# Patient Record
Sex: FEMALE | Race: WHITE | NOT HISPANIC OR LATINO | Employment: OTHER | URBAN - METROPOLITAN AREA
[De-identification: names, ages, dates, MRNs, and addresses within clinical notes are randomized per-mention and may not be internally consistent; named-entity substitution may affect disease eponyms.]

---

## 2017-02-03 ENCOUNTER — OFFICE VISIT (OUTPATIENT)
Dept: ENDOCRINOLOGY | Facility: CLINIC | Age: 72
End: 2017-02-03
Payer: COMMERCIAL

## 2017-02-03 VITALS
HEIGHT: 63 IN | SYSTOLIC BLOOD PRESSURE: 142 MMHG | HEART RATE: 78 BPM | DIASTOLIC BLOOD PRESSURE: 80 MMHG | WEIGHT: 158.5 LBS | BODY MASS INDEX: 28.08 KG/M2

## 2017-02-03 DIAGNOSIS — E04.1 SOLITARY THYROID NODULE: ICD-10-CM

## 2017-02-03 DIAGNOSIS — Z13.29 SCREENING FOR THYROID DISORDER: ICD-10-CM

## 2017-02-03 DIAGNOSIS — E04.1 THYROID NODULE: Primary | ICD-10-CM

## 2017-02-03 LAB
T4 FREE SERPL-MCNC: 1.06 NG/DL (ref 0.76–1.46)
TSH SERPL DL<=0.05 MIU/L-ACNC: 1.84 MU/L (ref 0.4–4)

## 2017-02-03 PROCEDURE — 84443 ASSAY THYROID STIM HORMONE: CPT | Performed by: INTERNAL MEDICINE

## 2017-02-03 PROCEDURE — 84439 ASSAY OF FREE THYROXINE: CPT | Performed by: INTERNAL MEDICINE

## 2017-02-03 PROCEDURE — 99204 OFFICE O/P NEW MOD 45 MIN: CPT | Performed by: INTERNAL MEDICINE

## 2017-02-03 PROCEDURE — 86376 MICROSOMAL ANTIBODY EACH: CPT | Performed by: INTERNAL MEDICINE

## 2017-02-03 PROCEDURE — 36415 COLL VENOUS BLD VENIPUNCTURE: CPT | Performed by: INTERNAL MEDICINE
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
## 2017-02-03 NOTE — MR AVS SNAPSHOT
After Visit Summary   2/3/2017    Nicole Slaughter    MRN: [id number]           Patient Information     Date Of Birth          1945        Visit Information        Provider Department      2/3/2017 11:00 AM Elizabeth Dey MD [facility]        Today's Diagnoses     Thyroid nodule    -  1     Screening for thyroid disorder           Care Instructions        Getting Ready for Ultrasound-guided Biopsy (FNA)  You will have a biopsy of your:  Thyroid   What is this test?  This test uses a very thin needle to remove tissue or other cells from your body. We then send the cells to a lab for testing.   Pictures from an ultrasound will help guide the needle to the right place. (Ultrasound uses sound waves to create pictures of the body on a video screen. You will not feel the sound waves.)  How do I get ready?  Tell us in advance if there s any chance you may be pregnant.  Bring a list of your medicines to the exam. Include vitamins, minerals and over-the-counter drugs.   ? If you take blood thinners, such as Aspirin or Ibuprofen, you will need to stop taking them a few days before treatment. Talk to your doctor before stopping these medicines.    Talk to your doctor before stopping these medicines:  ? Stop taking Coumadin (warfarin) 3 days before treatment. Restart the day after treatment.   ? Stop taking Coumadin (warfarin) 5 days before treatment. Restart the day after treatment.  ? If you take Plavix, Ticlid, Pletal or Persantine, please ask your doctor if you should stop these medicines. You may need extra tests on the morning of your scan.    What will happen during my test?    Please arrive 15 minutes early. (If you will receive sedation, arrive 60 minutes early.) Wear comfortable clothes and leave your valuables at home.   When it s time for your biopsy:    You will lie on your back or side.    We will do an ultrasound to find the area to be sampled.   - We rub warm gel on your  skin. The gel helps us get good pictures during the exam.  - We gently move a wand, or transducer, over your skin. (For prostate exams, we place the wand into the rectum. The wand is about 1 inch wide. It should not hurt, but it may cause discomfort.)  - The wand sends sound waves through your body. The sound waves make pictures on a video screen. You will not feel or hear the sound waves.    Next, we will ask you to stay very still. We will clean the skin and numb it with a shot of medicine. You will feel a slight burning as the skin gets numb.     The doctor will insert the needle and take one or more samples of cells or tissue. This takes only a few seconds. You will feel pressure or slight pain during this time. We may need to move the needle in and out to get enough cells.  You will need to lie quietly for a while after the test. We use an ice pack to reduce swelling or bleeding. You will also have a Band-Aid over the needle site.   When you get home, take it easy for the rest of the day. You may have slight bruising and mild pain in the area. If so, you may take acetaminophen (Tylenol) or ibuprofen (Advil, Motrin) after you get home.  Is it safe?  Your doctor will talk to you about any risks in advance.   When will I know the results?  Your Endocrinologist will give you the results when they are available.  Who should I call with questions?  Please call the Diagnostic Imaging Department with any questions. [phone].    Thank you for choosing the [facility], Department of Endocrinology. It has been a pleasure servicing you today. Please contact us at [phone] with any questions. If you need to speak to an Endocrinologist and it is after 5:00 pm or on a weekend, please call the On-Call Endocrinologist at [phone].          Follow-ups after your visit        Your next 10 appointments already scheduled     Apr 07, 2017 11:00 AM   US BIOPSY THYROID FINE NEEDLE ASPIRATION with MGUS1,  MG US TECH, MG IMAGING NURSE, MG NEURO RAD   [facility] ([facility])    [address] [place] [id number] [phone]           Tell us in advance if there s any chance you may be pregnant.  Bring a list of your medicines to the exam. Include vitamins, minerals and over-the-counter drugs.  If you take blood thinners, you may need to stop taking them a few days before treatment. Talk to your doctor before stopping these medicines. You will need a blood test the morning of your exam.   Stop taking Coumadin (warfarin) 3 days before your exam. Restart the day after your exam.   If you take aspirin, you may need to stop taking it 3 days before your scan.   If you take Plavix, Ticlid, Pletal or Persantine, you may need to stop taking them 5 days before your scan. Please talk to your doctor before stopping these medicines.  If you will receive sedation for this test (medicine to help you relax):   See your family doctor for an exam within 30 days of treatment.   Plan for an adult to drive you home and stay with you for at least 6 hours.   Follow the eating and drinking guidelines checked below:   No eating or drinking for 4 hours before your test. You may take medicine with small sips of water.   If you have diabetes:If you take insulin, call your diabetes care team. Do not take diabetes pills on the morning of your test. If you take metformin (Avandamet, Glucophage, Glucovance, Metaglip) and received contrast, wait 48 hours before re-starting this medicine.  Please call the Imaging Department at your exam site with any questions.            Apr 21, 2017 11:30 AM   Return Visit with Elizabeth Dey MD   [facility] ([facility])    [address] [address] [place] [id number] [phone]              Future tests that were ordered for you today     Open Future Orders        Priority Expected Expires Ordered    US guided thyroid FNA  "Routine  2/3/2018 2/3/2017            Who to contact     If you have questions or need follow up information about today's clinic visit or your schedule please contact [facility] directly at [phone].  Normal or non-critical lab and imaging results will be communicated to you by Cactushart, letter or phone within 4 business days after the clinic has received the results. If you do not hear from us within 7 days, please contact the clinic through Cactushart or phone. If you have a critical or abnormal lab result, we will notify you by phone as soon as possible.  Submit refill requests through Ohana or call your pharmacy and they will forward the refill request to us. Please allow 3 business days for your refill to be completed.          Additional Information About Your Visit        Ohana Information     Ohana gives you secure access to your electronic health record. If you see a primary care provider, you can also send messages to your care team and make appointments. If you have questions, please call your primary care clinic.  If you do not have a primary care provider, please call [phone] and they will assist you.      Ohana is an electronic gateway that provides easy, online access to your medical records. With Ohana, you can request a clinic appointment, read your test results, renew a prescription or communicate with your care team.     To access your existing account, please contact your [facility] or call [phone] for assistance.        Care EveryWhere ID     This is your Care EveryWhere ID. This could be used by other organizations to access your [facility] medical records  [id number]        Your Vitals Were     Pulse Height BMI (Body Mass Index)             78 1.6 m (5' 3\") 28.08 kg/m2          Blood Pressure from Last 3 Encounters:   02/03/17 142/80   11/22/16 147/93   11/15/16 138/74    Weight from Last 3 Encounters:   02/03/17 71.895 kg " (158 lb 8 oz)   11/15/16 69.4 kg (153 lb)   09/07/16 70.489 kg (155 lb 6.4 oz)              We Performed the Following     T4, free     Thyroid peroxidase antibody     TSH          Today's Medication Changes          These changes are accurate as of: 2/3/17 11:32 AM.  If you have any questions, ask your nurse or doctor.               Stop taking these medicines if you haven't already. Please contact your care team if you have questions.     fluticasone 50 MCG/ACT spray   Commonly known as:  FLONASE   Stopped by:  Elizabeth Dey MD                    Primary Care Provider Office Phone # Fax #    Lizett ECKERT MD [facility] [phone]       [facility] [id number] [place] [address]        Thank you!     Thank you for choosing [facility]  for your care. Our goal is always to provide you with excellent care. Hearing back from our patients is one way we can continue to improve our services. Please take a few minutes to complete the written survey that you may receive in the mail after your visit with us. Thank you!             Your Updated Medication List - Protect others around you: Learn how to safely use, store and throw away your medicines at www.disposemymeds.org.          This list is accurate as of: 2/3/17 11:32 AM.  Always use your most recent med list.                   Brand Name Dispense Instructions for use    acyclovir 400 MG tablet    ZOVIRAX    60 tablet    TAKE 1 TABLET (400 MG) BY ORAL ROUTE 2 TIMES PER DAY       atorvastatin 40 MG tablet    LIPITOR    10 tablet    TAKE 1 TABLET (40 MG) BY MOUTH TWICE A WEEK       CALCIUM-VITAMIN D PO      Take  by mouth daily.       FLUoxetine 10 MG capsule    PROzac    60 capsule    TAKE 2 CAPSULES (20 MG) BY MOUTH DAILY       gabapentin 100 MG capsule    NEURONTIN    270 capsule    Take 1 capsule (100 mg) by mouth 3 times daily       hydrochlorothiazide 25 MG tablet    HYDRODIURIL    90 tablet    TAKE 1 TABLET (25 MG) BY ORAL  ROUTE ONCE DAILY       OMEGA 3 PO      Take  by mouth. 1 DAILY       omeprazole 20 MG CR capsule    priLOSEC    30 capsule    TAKE 1 CAPSULE BY MOUTH DAILY       SUMAtriptan 50 MG tablet    IMITREX    18 tablet    TAKE 1 TABLET AT ONSET OF HEADACHE.  MAY REPEAT IN 2 HOURS AS NEEDED . MAX. 2 TABLETS DAILY. AVERAGE 12 HEADACHES MONTHLY       triamcinolone 0.1 % cream    KENALOG    15 g    Apply topically 2 times daily as needed for irritation Apply sparingly to affected area three times daily for 14 days.

## 2017-02-03 NOTE — NURSING NOTE
"Nicole Slaughter's goals for this visit include:   Chief Complaint   Patient presents with     Consult       She requests these members of her care team be copied on today's visit information: Yes    PCP: Lizett Parikh    Referring Provider:  Lizett Parikh MD  [facility]  [id number] [place] [id number]    Chief Complaint   Patient presents with     Consult       Initial Ht 1.6 m (5' 3\")  Wt 71.895 kg (158 lb 8 oz)  BMI 28.08 kg/m2 Estimated body mass index is 28.08 kg/(m^2) as calculated from the following:    Height as of this encounter: 1.6 m (5' 3\").    Weight as of this encounter: 71.895 kg (158 lb 8 oz).  BP completed using cuff size: regular    "

## 2017-02-03 NOTE — PATIENT INSTRUCTIONS
Getting Ready for Ultrasound-guided Biopsy (FNA)  You will have a biopsy of your:  Thyroid   What is this test?  This test uses a very thin needle to remove tissue or other cells from your body. We then send the cells to a lab for testing.   Pictures from an ultrasound will help guide the needle to the right place. (Ultrasound uses sound waves to create pictures of the body on a video screen. You will not feel the sound waves.)  How do I get ready?  Tell us in advance if there s any chance you may be pregnant.  Bring a list of your medicines to the exam. Include vitamins, minerals and over-the-counter drugs.   ? If you take blood thinners, such as Aspirin or Ibuprofen, you will need to stop taking them a few days before treatment. Talk to your doctor before stopping these medicines.    Talk to your doctor before stopping these medicines:  ? Stop taking Coumadin (warfarin) 3 days before treatment. Restart the day after treatment.   ? Stop taking Coumadin (warfarin) 5 days before treatment. Restart the day after treatment.  ? If you take Plavix, Ticlid, Pletal or Persantine, please ask your doctor if you should stop these medicines. You may need extra tests on the morning of your scan.    What will happen during my test?    Please arrive 15 minutes early. (If you will receive sedation, arrive 60 minutes early.) Wear comfortable clothes and leave your valuables at home.   When it s time for your biopsy:    You will lie on your back or side.    We will do an ultrasound to find the area to be sampled.   - We rub warm gel on your skin. The gel helps us get good pictures during the exam.  - We gently move a wand, or transducer, over your skin. (For prostate exams, we place the wand into the rectum. The wand is about 1 inch wide. It should not hurt, but it may cause discomfort.)  - The wand sends sound waves through your body. The sound waves make pictures on a video screen. You will not feel or hear the sound waves.     Next, we will ask you to stay very still. We will clean the skin and numb it with a shot of medicine. You will feel a slight burning as the skin gets numb.     The doctor will insert the needle and take one or more samples of cells or tissue. This takes only a few seconds. You will feel pressure or slight pain during this time. We may need to move the needle in and out to get enough cells.  You will need to lie quietly for a while after the test. We use an ice pack to reduce swelling or bleeding. You will also have a Band-Aid over the needle site.   When you get home, take it easy for the rest of the day. You may have slight bruising and mild pain in the area. If so, you may take acetaminophen (Tylenol) or ibuprofen (Advil, Motrin) after you get home.  Is it safe?  Your doctor will talk to you about any risks in advance.   When will I know the results?  Your Endocrinologist will give you the results when they are available.  Who should I call with questions?  Please call the Diagnostic Imaging Department with any questions. [phone].    Thank you for choosing the [facility], Department of Endocrinology. It has been a pleasure servicing you today. Please contact us at [phone] with any questions. If you need to speak to an Endocrinologist and it is after 5:00 pm or on a weekend, please call the On-Call Endocrinologist at [phone].

## 2017-02-06 LAB — THYROPEROXIDASE AB SERPL-ACNC: 60 IU/ML

## 2017-03-01 DIAGNOSIS — I10 ESSENTIAL HYPERTENSION WITH GOAL BLOOD PRESSURE LESS THAN 140/90: ICD-10-CM

## 2017-03-02 RX ORDER — HYDROCHLOROTHIAZIDE 25 MG/1
TABLET ORAL
Qty: 90 TABLET | Refills: 0 | Status: SHIPPED | OUTPATIENT
Start: 2017-03-02 | End: 2017-06-30

## 2017-03-02 NOTE — TELEPHONE ENCOUNTER
Hydrochlorothiazide 25 mg      Last Written Prescription Date: 09/29/2016  Last Fill Quantity: 90, # refills: 1  Last Office Visit with [facility], [facility] or [facility] prescribing provider: 11/15/2016  Next 5 appointments (look out 90 days)     Apr 21, 2017 11:30 AM CDT   Return Visit with Elizabeth Dey MD   [facility] ([facility])    [address] [place] [id number] [phone]                   Potassium   Date Value Ref Range Status   04/29/2016 3.6 3.4 - 5.3 mmol/L Final     Creatinine   Date Value Ref Range Status   04/29/2016 0.67 0.52 - 1.04 mg/dL Final     BP Readings from Last 3 Encounters:   02/03/17 142/80   11/22/16 (!) 147/93   11/15/16 138/74

## 2017-03-02 NOTE — TELEPHONE ENCOUNTER
Prescription approved per [facility] Refill Protocol.  Due for labs in April  Tatiana Lizama RN

## 2017-04-07 ENCOUNTER — RADIANT APPOINTMENT (OUTPATIENT)
Dept: ULTRASOUND IMAGING | Facility: CLINIC | Age: 72
End: 2017-04-07
Attending: INTERNAL MEDICINE
Payer: COMMERCIAL

## 2017-04-07 DIAGNOSIS — E04.1 THYROID NODULE: ICD-10-CM

## 2017-04-07 PROCEDURE — 88173 CYTOPATH EVAL FNA REPORT: CPT | Performed by: FAMILY MEDICINE

## 2017-04-07 PROCEDURE — 76942 ECHO GUIDE FOR BIOPSY: CPT | Performed by: RADIOLOGY

## 2017-04-07 PROCEDURE — 00000102 ZZHCL STATISTIC CYTO WRIGHT STAIN TC: Performed by: FAMILY MEDICINE

## 2017-04-07 PROCEDURE — 10022 US BIOPSY THYROID FINE NEEDLE ASPIRATION: CPT | Performed by: RADIOLOGY

## 2017-04-12 LAB — COPATH REPORT: NORMAL

## 2017-04-21 ENCOUNTER — OFFICE VISIT (OUTPATIENT)
Dept: ENDOCRINOLOGY | Facility: CLINIC | Age: 72
End: 2017-04-21
Payer: COMMERCIAL

## 2017-04-21 VITALS
SYSTOLIC BLOOD PRESSURE: 122 MMHG | HEIGHT: 63 IN | HEART RATE: 76 BPM | BODY MASS INDEX: 28 KG/M2 | WEIGHT: 158 LBS | DIASTOLIC BLOOD PRESSURE: 70 MMHG

## 2017-04-21 DIAGNOSIS — E28.319 EARLY MENOPAUSE OCCURRING IN PATIENT AGE YOUNGER THAN 45 YEARS: Primary | ICD-10-CM

## 2017-04-21 DIAGNOSIS — E04.1 THYROID NODULE: ICD-10-CM

## 2017-04-21 PROCEDURE — 99214 OFFICE O/P EST MOD 30 MIN: CPT | Performed by: INTERNAL MEDICINE

## 2017-04-21 NOTE — MR AVS SNAPSHOT
After Visit Summary   4/21/2017    Nicole Slaughter    MRN: [id number]           Patient Information     Date Of Birth          1945        Visit Information        Provider Department      4/21/2017 11:30 AM Elizabeth Dey MD [facility]        Today's Diagnoses     Early menopause occurring in patient age younger than 45 years    -  1    Thyroid nodule          Care Instructions    Thank you for choosing the [facility] Department of Endocrinology. It has been a pleasure servicing you today. Please contact us at [phone] with any questions. If you need to speak to an Endocrinologist and it is after 5:00 pm or on a weekend, please call the On-Call Endocrinologist at [phone].    4/27/18 11:30 follow up with Dr Dey    Please arrive 15 minutes early for DEXA and Ultra Sound. For Dexa please wear no snaps, zippers or buttons.    If you are taking Calcium (including Tums), Multivitamin, or Vitamin D; Please discontinue 2 days prior and day of DEXA Scan. It is ok to resume these medications after the scan is complete unless indicated by your doctor. Please check in at the West Entrance. Desk #2.             Follow-ups after your visit        Your next 10 appointments already scheduled     May 01, 2017 11:30 AM CDT   DX HIP/PELVIS/SPINE with MGDX1   [facility])    [address] [place] [id number] [phone]           Please do not take any of the following 48 hours prior to your exam: vitamins, calcium tablets, antacids.            Apr 16, 2018 11:30 AM CDT   US THYROID with MGUS1, MG US TECH   [facility])    [address] [place] [address] [phone]           Please bring a list of your medicines (including vitamins, minerals and over-the-counter drugs). Also, tell your doctor about any allergies you may have. Wear  comfortable clothes and leave your valuables at home.  You do not need to do anything special to prepare for your exam.  Please call the Imaging Department at your exam site with any questions.              Future tests that were ordered for you today     Open Future Orders        Priority Expected Expires Ordered    US Thyroid Routine 4/21/2018 6/21/2018 4/21/2017    DX Hip/Pelvis/Spine Routine  4/21/2018 4/21/2017            Who to contact     If you have questions or need follow up information about today's clinic visit or your schedule please contact [facility] directly at [phone].  Normal or non-critical lab and imaging results will be communicated to you by Litographshart, letter or phone within 4 business days after the clinic has received the results. If you do not hear from us within 7 days, please contact the clinic through Litographshart or phone. If you have a critical or abnormal lab result, we will notify you by phone as soon as possible.  Submit refill requests through Wayna or call your pharmacy and they will forward the refill request to us. Please allow 3 business days for your refill to be completed.          Additional Information About Your Visit        MyChart Information     Wayna gives you secure access to your electronic health record. If you see a primary care provider, you can also send messages to your care team and make appointments. If you have questions, please call your primary care clinic.  If you do not have a primary care provider, please call [phone] and they will assist you.      Wayna is an electronic gateway that provides easy, online access to your medical records. With Wayna, you can request a clinic appointment, read your test results, renew a prescription or communicate with your care team.     To access your existing account, please contact your [facility] or call [phone] for assistance.        Care EveryWhere ID   "   This is your Care EveryWhere ID. This could be used by other organizations to access your [place] medical records  [id number]        Your Vitals Were     Pulse Height BMI (Body Mass Index)             76 1.6 m (5' 3\") 27.99 kg/m2          Blood Pressure from Last 3 Encounters:   04/21/17 122/70   02/03/17 142/80   11/22/16 (!) 147/93    Weight from Last 3 Encounters:   04/21/17 71.7 kg (158 lb)   02/03/17 71.9 kg (158 lb 8 oz)   11/15/16 69.4 kg (153 lb)               Primary Care Provider Office Phone # Fax #    Lizett Parikh [phone]       [facility] [address] [facility] [address]        Thank you!     Thank you for choosing [facility]  for your care. Our goal is always to provide you with excellent care. Hearing back from our patients is one way we can continue to improve our services. Please take a few minutes to complete the written survey that you may receive in the mail after your visit with us. Thank you!             Your Updated Medication List - Protect others around you: Learn how to safely use, store and throw away your medicines at www.disposemymeds.org.          This list is accurate as of: 4/21/17 12:25 PM.  Always use your most recent med list.                   Brand Name Dispense Instructions for use    acyclovir 400 MG tablet    ZOVIRAX    60 tablet    TAKE 1 TABLET (400 MG) BY ORAL ROUTE 2 TIMES PER DAY       atorvastatin 40 MG tablet    LIPITOR    10 tablet    TAKE 1 TABLET (40 MG) BY MOUTH TWICE A WEEK       CALCIUM-VITAMIN D PO      Take  by mouth daily.       FLUoxetine 10 MG capsule    PROzac    60 capsule    TAKE 2 CAPSULES (20 MG) BY MOUTH DAILY       gabapentin 100 MG capsule    NEURONTIN    270 capsule    Take 1 capsule (100 mg) by mouth 3 times daily       hydrochlorothiazide 25 MG tablet    HYDRODIURIL    90 tablet    TAKE ONE TABLET BY MOUTH ONE TIME DAILY       OMEGA 3 PO      Reported on 4/21/2017       omeprazole 20 MG CR " capsule    priLOSEC    30 capsule    TAKE 1 CAPSULE BY MOUTH DAILY       SUMAtriptan 50 MG tablet    IMITREX    18 tablet    TAKE 1 TABLET AT ONSET OF HEADACHE.  MAY REPEAT IN 2 HOURS AS NEEDED . MAX. 2 TABLETS DAILY. AVERAGE 12 HEADACHES MONTHLY       triamcinolone 0.1 % cream    KENALOG    15 g    Apply topically 2 times daily as needed for irritation Apply sparingly to affected area three times daily for 14 days.

## 2017-04-21 NOTE — PROGRESS NOTES
- Endocrinology Follow up-    Reason for visit/consult: follow up thyroid nodule    Primary care provider: Lizett Parikh    HPI: 71 yo male with follow up right thyroid nodule 2 cm solid,  First FNA was AUS, then came here and we repeated on 4/7/2017.   Results were benign.     Still osing eye brow over 1 year, lost eye lush last year.    Today she mentioned her nails also became fragile.     Menopause: age 39 endometriosis  7 year ago bone density was normal.     Prior fragility fracture:no  Parental history of hip fracture:  Rheumatoid arthritis:no (osteroarthritis)  Secondary cause of osteoporosis:  Body habitus (BMI less than or equal to 20):   family histoyr of osteoporosis: no  Sedentary lifestyle:no  Current tabacco smoking:no  History of thyroid disorder:nodule  Calcuim and Vitmin D supplements:some calcium tab, greek yogurt  Other supplement:    Past Medical/Surgical History:  Past Medical History:   Diagnosis Date     Cervicalgia      Depressive disorder      Generalized osteoarthrosis, unspecified site      Migraine, unspecified, without mention of intractable migraine without mention of status migrainosus      Thyroid nodule 12/1/2016     Unspecified essential hypertension      Past Surgical History:   Procedure Laterality Date     HYSTERECTOMY, ATNG       ORTHOPEDIC SURGERY       SOFT TISSUE SURGERY         Allergies:  Allergies   Allergen Reactions     Tylenol W/Codeine [Acetaminophen-Codeine] Itching     codeine     Percocet [Oxycodone-Acetaminophen] Rash     Sulfa Drugs Rash       Current Medications   Current Outpatient Prescriptions   Medication     hydrochlorothiazide (HYDRODIURIL) 25 MG tablet     acyclovir (ZOVIRAX) 400 MG tablet     FLUoxetine (PROZAC) 10 MG capsule     gabapentin (NEURONTIN) 100 MG capsule     atorvastatin (LIPITOR) 40 MG tablet     CALCIUM-VITAMIN D PO     SUMAtriptan (IMITREX) 50 MG tablet     triamcinolone (KENALOG) 0.1 % cream      "omeprazole (PRILOSEC) 20 MG capsule     Omega-3 Fatty Acids (OMEGA 3 PO)     No current facility-administered medications for this visit.        Family History:  Family History   Problem Relation Age of Onset     Arthritis Mother      Lipids Mother      Hypertension Mother      Breast Cancer Mother      Anesthesia Reaction Mother      HEART DISEASE Father      CEREBROVASCULAR DISEASE Father      Arthritis Maternal Grandmother      Breast Cancer Maternal Aunt      Breast Cancer Maternal Aunt      DIABETES No family hx of      Coronary Artery Disease No family hx of      Hyperlipidemia No family hx of      Prostate Cancer No family hx of      Other Cancer No family hx of      Anxiety Disorder No family hx of      MENTAL ILLNESS No family hx of      Substance Abuse No family hx of        Social History:  Social History   Substance Use Topics     Smoking status: Never Smoker     Smokeless tobacco: Never Used     Alcohol use Yes      Comment: wine       ROS:  Full review of systems taken with the help of the intake sheet. Otherwise a complete 14 point review of systems was taken and is negative unless stated in the history above.    Physical Exam:   Blood pressure 122/70, pulse 76, height 1.6 m (5' 3\"), weight 71.7 kg (158 lb), not currently breastfeeding.  General: well appearing, no acute distress, pleasant and conversant,   Mental Status/neuro: alert and oriented  Face: symmetrical, normal facial color  Eyes: anicteric, PERRL,   Neck: suppler, no lymphadenopahty  Heart: regular rhythm, S1S2, no murmur appreciated  Lung: clear to auscultation bilaterally  Abdomen: soft, NT/ND, no hepatomegaly  Legs: no swelling or edema        Labs (General):   Lab Results   Component Value Date     04/29/2016      Lab Results   Component Value Date    POTASSIUM 3.6 04/29/2016     Lab Results   Component Value Date    CHLORIDE 103 04/29/2016     Lab Results   Component Value Date    MELLISA 8.7 04/29/2016     Lab Results   Component " Value Date    CO2 27 04/29/2016     Lab Results   Component Value Date    BUN 14 04/29/2016     Lab Results   Component Value Date    CR 0.67 04/29/2016     Lab Results   Component Value Date    GLC 89 04/29/2016     Lab Results   Component Value Date    TSH 1.84 02/03/2017     Lab Results   Component Value Date    T4 1.06 02/03/2017       Repeated FNA right nodule 4/7/2017    CYTOLOGIC INTERPRETATION:      Thyroid, right nodule, ultrasound-guided fine needle aspiration:   Benign.   Consistent with a benign nodule (includes adenomatoid nodule, colloid   nodule, etc.)   Specimen Adequacy: Satisfactory for evaluation.     The [place] Implied Risk of Malignancy and Recommended Clinical   Management:   Benign has a 0-3% risk of malignancy, recommended management is clinical   follow-up.     COMMENT:    This case was also reviewed by  who concurred with the above   diagnosis.     I have personally reviewed all specimens and/or slides, including the   listed special stains, and used them with my medical judgment to   determine the final diagnosis.     Electronically signed out by:   Wade Contreras M.D., Physicians     Processed and screened at [facility]     CLINICAL HISTORY:   The patient is a 72-year-old woman with a 1.9 cm right thyroid lobe   nodule with minima vascularity.  Previous fine needle aspiration was   consistent with atypical of undetermined significance.  Procedure:   Ultrasound-guided fine needle aspiration.       Assessment and Plan  72 year old female with thyroid nodule follow up  # nodule was benign, explained patient and gave a copy of the results  - Thyroid sonogram annual follow up next year 4/2018.     # bone health- risk factor early menopause age 39  - Bone density  -calicum citrate plus D 1260 mg ca, 1000 IU VitD  -based on the results, we will consider further bone treatment.    - RTC with me in 1 year      I spent 30  minutes with this patient face to face and explained the conditions and plans (more than 50% of time was counseling/coordination of care, follow up plan of thryoid, bone health) . The patient understood and is satisfied with today's visit. Return to clinic with me in 1 year.         Elizabeth Dye MD  Staff Physician  Endocrinology and Metabolism  License: [id number]

## 2017-04-21 NOTE — NURSING NOTE
"Nicole Slaughter's goals for this visit include:   Chief Complaint   Patient presents with     Thyroid Problem       She requests these members of her care team be copied on today's visit information: Lizett Parikh      PCP: Lizett Parikh    Referring Provider:  No referring provider defined for this encounter.    Chief Complaint   Patient presents with     Thyroid Problem       Initial /70  Pulse 76  Ht 1.6 m (5' 3\")  Wt 71.7 kg (158 lb)  BMI 27.99 kg/m2 Estimated body mass index is 27.99 kg/(m^2) as calculated from the following:    Height as of this encounter: 1.6 m (5' 3\").    Weight as of this encounter: 71.7 kg (158 lb).  Medication Reconciliation: complete    Do you need any medication refills at today's visit? No    Ora Dubose LPN    "

## 2017-04-21 NOTE — LETTER
4/21/2017       RE: Nicole Slaughter  [address] [place] [address]     Dear Colleague,    Thank you for referring your patient, Nicole Slaughter, to the [facility]. Please see a copy of my visit note below.                                      - Endocrinology Follow up-    Reason for visit/consult: follow up thyroid nodule    Primary care provider: iLzett Parikh    HPI: 73 yo male with follow up right thyroid nodule 2 cm solid,  First FNA was AUS, then came here and we repeated on 4/7/2017.   Results were benign.     Still osing eye brow over 1 year, lost eye lush last year.    Today she mentioned her nails also became fragile.     Menopause: age 39 endometriosis  7 year ago bone density was normal.     Prior fragility fracture:no  Parental history of hip fracture:  Rheumatoid arthritis:no (osteroarthritis)  Secondary cause of osteoporosis:  Body habitus (BMI less than or equal to 20):   family histoyr of osteoporosis: no  Sedentary lifestyle:no  Current tabacco smoking:no  History of thyroid disorder:nodule  Calcuim and Vitmin D supplements:some calcium tab, greek yogurt  Other supplement:    Past Medical/Surgical History:  Past Medical History:   Diagnosis Date     Cervicalgia      Depressive disorder      Generalized osteoarthrosis, unspecified site      Migraine, unspecified, without mention of intractable migraine without mention of status migrainosus      Thyroid nodule 12/1/2016     Unspecified essential hypertension      Past Surgical History:   Procedure Laterality Date     HYSTERECTOMY, TANG       ORTHOPEDIC SURGERY       SOFT TISSUE SURGERY         Allergies:  Allergies   Allergen Reactions     Tylenol W/Codeine [Acetaminophen-Codeine] Itching     codeine     Percocet [Oxycodone-Acetaminophen] Rash     Sulfa Drugs Rash       Current Medications   Current Outpatient Prescriptions   Medication     hydrochlorothiazide  "(HYDRODIURIL) 25 MG tablet     acyclovir (ZOVIRAX) 400 MG tablet     FLUoxetine (PROZAC) 10 MG capsule     gabapentin (NEURONTIN) 100 MG capsule     atorvastatin (LIPITOR) 40 MG tablet     CALCIUM-VITAMIN D PO     SUMAtriptan (IMITREX) 50 MG tablet     triamcinolone (KENALOG) 0.1 % cream     omeprazole (PRILOSEC) 20 MG capsule     Omega-3 Fatty Acids (OMEGA 3 PO)     No current facility-administered medications for this visit.        Family History:  Family History   Problem Relation Age of Onset     Arthritis Mother      Lipids Mother      Hypertension Mother      Breast Cancer Mother      Anesthesia Reaction Mother      HEART DISEASE Father      CEREBROVASCULAR DISEASE Father      Arthritis Maternal Grandmother      Breast Cancer Maternal Aunt      Breast Cancer Maternal Aunt      DIABETES No family hx of      Coronary Artery Disease No family hx of      Hyperlipidemia No family hx of      Prostate Cancer No family hx of      Other Cancer No family hx of      Anxiety Disorder No family hx of      MENTAL ILLNESS No family hx of      Substance Abuse No family hx of        Social History:  Social History   Substance Use Topics     Smoking status: Never Smoker     Smokeless tobacco: Never Used     Alcohol use Yes      Comment: wine       ROS:  Full review of systems taken with the help of the intake sheet. Otherwise a complete 14 point review of systems was taken and is negative unless stated in the history above.    Physical Exam:   Blood pressure 122/70, pulse 76, height 1.6 m (5' 3\"), weight 71.7 kg (158 lb), not currently breastfeeding.  General: well appearing, no acute distress, pleasant and conversant,   Mental Status/neuro: alert and oriented  Face: symmetrical, normal facial color  Eyes: anicteric, PERRL,   Neck: suppler, no lymphadenopahty  Heart: regular rhythm, S1S2, no murmur appreciated  Lung: clear to auscultation bilaterally  Abdomen: soft, NT/ND, no hepatomegaly  Legs: no swelling or edema        Labs " (General):   Lab Results   Component Value Date     04/29/2016      Lab Results   Component Value Date    POTASSIUM 3.6 04/29/2016     Lab Results   Component Value Date    CHLORIDE 103 04/29/2016     Lab Results   Component Value Date    MELLISA 8.7 04/29/2016     Lab Results   Component Value Date    CO2 27 04/29/2016     Lab Results   Component Value Date    BUN 14 04/29/2016     Lab Results   Component Value Date    CR 0.67 04/29/2016     Lab Results   Component Value Date    GLC 89 04/29/2016     Lab Results   Component Value Date    TSH 1.84 02/03/2017     Lab Results   Component Value Date    T4 1.06 02/03/2017       Repeated FNA right nodule 4/7/2017    CYTOLOGIC INTERPRETATION:      Thyroid, right nodule, ultrasound-guided fine needle aspiration:   Benign.   Consistent with a benign nodule (includes adenomatoid nodule, colloid   nodule, etc.)   Specimen Adequacy: Satisfactory for evaluation.     The [place] Implied Risk of Malignancy and Recommended Clinical   Management:   Benign has a 0-3% risk of malignancy, recommended management is clinical   follow-up.     COMMENT:    This case was also reviewed by  who concurred with the above   diagnosis.     I have personally reviewed all specimens and/or slides, including the   listed special stains, and used them with my medical judgment to   determine the final diagnosis.     Electronically signed out by:   Wade Contreras M.D., [facility]     Processed and screened at [facility]     CLINICAL HISTORY:   The patient is a 72-year-old woman with a 1.9 cm right thyroid lobe   nodule with minima vascularity.  Previous fine needle aspiration was   consistent with atypical of undetermined significance.  Procedure:   Ultrasound-guided fine needle aspiration.       Assessment and Plan  72 year old female with thyroid nodule follow up  # nodule was benign, explained patient and gave a copy of the  results  - Thyroid sonogram annual follow up next year 4/2018.     # bone health- risk factor early menopause age 39  - Bone density  -calicum citrate plus D 1260 mg ca, 1000 IU VitD  -based on the results, we will consider further bone treatment.    - RTC with me in 1 year      I spent 30 minutes with this patient face to face and explained the conditions and plans (more than 50% of time was counseling/coordination of care, follow up plan of thryoid, bone health) . The patient understood and is satisfied with today's visit. Return to clinic with me in 1 year.         Elizabeth Dey MD  Staff Physician  Endocrinology and Metabolism  License: [id number]    Again, thank you for allowing me to participate in the care of your patient.      Sincerely,    Elizabeth Dey MD

## 2017-04-21 NOTE — PATIENT INSTRUCTIONS
Thank you for choosing the [facility], Department of Endocrinology. It has been a pleasure servicing you today. Please contact us at [phone] with any questions. If you need to speak to an Endocrinologist and it is after 5:00 pm or on a weekend, please call the On-Call Endocrinologist at [phone].    4/27/18 11:30 follow up with Dr Dey    Please arrive 15 minutes early for DEXA and Ultra Sound. For Dexa please wear no snaps, zippers or buttons.    If you are taking Calcium (including Tums), Multivitamin, or Vitamin D; Please discontinue 2 days prior and day of DEXA Scan. It is ok to resume these medications after the scan is complete unless indicated by your doctor. Please check in at the West Entrance. Desk #2.

## 2017-05-01 ENCOUNTER — RADIANT APPOINTMENT (OUTPATIENT)
Dept: BONE DENSITY | Facility: CLINIC | Age: 72
End: 2017-05-01
Attending: INTERNAL MEDICINE
Payer: COMMERCIAL

## 2017-05-01 DIAGNOSIS — E28.319 EARLY MENOPAUSE OCCURRING IN PATIENT AGE YOUNGER THAN 45 YEARS: ICD-10-CM

## 2017-05-01 PROCEDURE — 77080 DXA BONE DENSITY AXIAL: CPT | Performed by: RADIOLOGY

## 2017-05-03 DIAGNOSIS — K21.9 GERD (GASTROESOPHAGEAL REFLUX DISEASE): ICD-10-CM

## 2017-05-03 NOTE — TELEPHONE ENCOUNTER
prilosec      Last Written Prescription Date: 04/01/16  Last Fill Quantity: 30,  # refills: 11   Last Office Visit with [facility], [facility] or [facility] prescribing provider: 11/15/16

## 2017-05-04 ENCOUNTER — TELEPHONE (OUTPATIENT)
Dept: ENDOCRINOLOGY | Facility: CLINIC | Age: 72
End: 2017-05-04

## 2017-05-04 NOTE — TELEPHONE ENCOUNTER
[facility] Call Center    Phone Message    Name of Caller: tamia    Phone Number: Home number on file [phone] (home)    Best time to return call: any    May a detailed message be left on voicemail: yes    Relation to patient: Self    Reason for Call: Other: Patient states Dr Dey wants to see her in June.  No appointments available.  Was an appointment in Mid May.  She did not want that one due to new medication?      Action Taken: Message routed to:  Adult Clinics: Endocrinology p [phone]

## 2017-05-04 NOTE — TELEPHONE ENCOUNTER
Received staff message from Dr. Dey as follows:    Hi     We found she has osteopenia, which is new, (originally came for thyroid), I told her to come back brief visti next month and will discuss bone treatment.   Could you assist?     Elizabeth       Patient advised. Patient verbalizes understanding and agrees to plan. Appointment scheduled.       Swathi Santana RN  Endocrine Care Coordinator  [facility]

## 2017-05-05 NOTE — TELEPHONE ENCOUNTER
Prescription approved per [facility] Refill Protocol.  Rosa Mera, RN, BSN

## 2017-05-06 DIAGNOSIS — E78.5 HYPERLIPIDEMIA LDL GOAL <130: Primary | ICD-10-CM

## 2017-05-08 RX ORDER — ATORVASTATIN CALCIUM 40 MG/1
TABLET, FILM COATED ORAL
Qty: 10 TABLET | Refills: 0 | Status: SHIPPED | OUTPATIENT
Start: 2017-05-08 | End: 2017-06-21

## 2017-05-08 NOTE — TELEPHONE ENCOUNTER
Medication is being filled for 1 time refill only due to:  Patient needs labs fasting.     Gay Weir, RN, BSN

## 2017-05-08 NOTE — TELEPHONE ENCOUNTER
atorvastatin (LIPITOR) 40 MG tablet     Last Written Prescription Date: 7/29/2016  Last Fill Quantity: 10, # refills: 6  Last Office Visit with [facility], [facility] or [facility] prescribing provider: 11/15/2016  Next 5 appointments (look out 90 days)     May 19, 2017 10:30 AM CDT   Return Visit with Elizabeth Dey MD   [facility]    [address] [place] [id number]   [phone]                   Lab Results   Component Value Date    CHOL 195 04/29/2016     Lab Results   Component Value Date    HDL 60 04/29/2016     Lab Results   Component Value Date     04/29/2016     Lab Results   Component Value Date    TRIG 112 04/29/2016     Lab Results   Component Value Date    CHOLHDLRATIO 3.0 03/24/2015

## 2017-05-15 DIAGNOSIS — E78.5 HYPERLIPIDEMIA LDL GOAL <130: ICD-10-CM

## 2017-05-15 LAB
ALT SERPL W P-5'-P-CCNC: 30 U/L (ref 0–50)
ANION GAP SERPL CALCULATED.3IONS-SCNC: 9 MMOL/L (ref 3–14)
BUN SERPL-MCNC: 18 MG/DL (ref 7–30)
CALCIUM SERPL-MCNC: 9 MG/DL (ref 8.5–10.1)
CHLORIDE SERPL-SCNC: 102 MMOL/L (ref 94–109)
CHOLEST SERPL-MCNC: 225 MG/DL
CO2 SERPL-SCNC: 30 MMOL/L (ref 20–32)
CREAT SERPL-MCNC: 0.77 MG/DL (ref 0.52–1.04)
GFR SERPL CREATININE-BSD FRML MDRD: 74 ML/MIN/1.7M2
GLUCOSE SERPL-MCNC: 99 MG/DL (ref 70–99)
HDLC SERPL-MCNC: 80 MG/DL
LDLC SERPL CALC-MCNC: 129 MG/DL
NONHDLC SERPL-MCNC: 145 MG/DL
POTASSIUM SERPL-SCNC: 3.9 MMOL/L (ref 3.4–5.3)
SODIUM SERPL-SCNC: 141 MMOL/L (ref 133–144)
TRIGL SERPL-MCNC: 81 MG/DL

## 2017-05-15 PROCEDURE — 84460 ALANINE AMINO (ALT) (SGPT): CPT | Performed by: FAMILY MEDICINE

## 2017-05-15 PROCEDURE — 80061 LIPID PANEL: CPT | Performed by: FAMILY MEDICINE

## 2017-05-15 PROCEDURE — 36415 COLL VENOUS BLD VENIPUNCTURE: CPT | Performed by: FAMILY MEDICINE

## 2017-05-15 PROCEDURE — 80048 BASIC METABOLIC PNL TOTAL CA: CPT | Performed by: FAMILY MEDICINE

## 2017-05-19 ENCOUNTER — OFFICE VISIT (OUTPATIENT)
Dept: ENDOCRINOLOGY | Facility: CLINIC | Age: 72
End: 2017-05-19
Payer: COMMERCIAL

## 2017-05-19 VITALS
HEIGHT: 63 IN | DIASTOLIC BLOOD PRESSURE: 69 MMHG | HEART RATE: 81 BPM | WEIGHT: 159.4 LBS | SYSTOLIC BLOOD PRESSURE: 136 MMHG | BODY MASS INDEX: 28.24 KG/M2

## 2017-05-19 DIAGNOSIS — M85.80 OSTEOPENIA: Primary | ICD-10-CM

## 2017-05-19 DIAGNOSIS — E04.1 THYROID NODULE: ICD-10-CM

## 2017-05-19 PROBLEM — E28.319 EARLY MENOPAUSE: Status: ACTIVE | Noted: 2017-05-19

## 2017-05-19 PROCEDURE — 99214 OFFICE O/P EST MOD 30 MIN: CPT | Performed by: INTERNAL MEDICINE

## 2017-05-19 RX ORDER — ZOLEDRONIC ACID 5 MG/100ML
5 INJECTION, SOLUTION INTRAVENOUS ONCE
Status: CANCELLED
Start: 2017-05-19 | End: 2017-05-19

## 2017-05-19 NOTE — LETTER
5/19/2017       RE: Nicole Slaughter  [address] [place] [address]     Dear Colleague,    Thank you for referring your patient, Nicole Slaughter, to the [facility] at [facility]. Please see a copy of my visit note below.                                                     - Endocrinology Initial Consultation -    Reason for visit/consult: osteopenia, thyroid nodule    Primary care provider: Lizett Parikh    HPI: 73 yo male with follow up right thyroid nodule 2 cm solid, first FNA was AUS, then repeated one was benign on 4/7/2017. She has remote history of endometriosis and had early menopause age 39.  No fractures, active life+.  Sent for bone density test, which came back osteopenia (T=-2.1).         Past Medical/Surgical History:  Past Medical History:   Diagnosis Date     Cervicalgia      Depressive disorder      Generalized osteoarthrosis, unspecified site      Migraine, unspecified, without mention of intractable migraine without mention of status migrainosus      Thyroid nodule 12/1/2016     Unspecified essential hypertension      Past Surgical History:   Procedure Laterality Date     HYSTERECTOMY, TANG       ORTHOPEDIC SURGERY       SOFT TISSUE SURGERY         Allergies:  Allergies   Allergen Reactions     Tylenol W/Codeine [Acetaminophen-Codeine] Itching     codeine     Percocet [Oxycodone-Acetaminophen] Rash     Sulfa Drugs Rash       Current Medications   Current Outpatient Prescriptions   Medication     MAGNESIUM PO     Probiotic Product (PROBIOTIC DAILY PO)     Black Pepper-Turmeric (TURMERIC COMPLEX/BLACK PEPPER PO)     omeprazole (PRILOSEC) 20 MG CR capsule     gabapentin (NEURONTIN) 100 MG capsule     hydrochlorothiazide (HYDRODIURIL) 25 MG tablet     acyclovir (ZOVIRAX) 400 MG tablet     FLUoxetine (PROZAC) 10 MG capsule     atorvastatin (LIPITOR) 40 MG tablet     CALCIUM-VITAMIN D PO     atorvastatin (LIPITOR) 40 MG tablet      "SUMAtriptan (IMITREX) 50 MG tablet     triamcinolone (KENALOG) 0.1 % cream     Omega-3 Fatty Acids (OMEGA 3 PO)     No current facility-administered medications for this visit.        Family History:  Family History   Problem Relation Age of Onset     Arthritis Mother      Lipids Mother      Hypertension Mother      Breast Cancer Mother      Anesthesia Reaction Mother      HEART DISEASE Father      CEREBROVASCULAR DISEASE Father      Arthritis Maternal Grandmother      Breast Cancer Maternal Aunt      Breast Cancer Maternal Aunt      DIABETES No family hx of      Coronary Artery Disease No family hx of      Hyperlipidemia No family hx of      Prostate Cancer No family hx of      Other Cancer No family hx of      Anxiety Disorder No family hx of      MENTAL ILLNESS No family hx of      Substance Abuse No family hx of        Social History:  Social History   Substance Use Topics     Smoking status: Never Smoker     Smokeless tobacco: Never Used     Alcohol use Yes      Comment: wine       ROS:  Full review of systems taken with the help of the intake sheet. Otherwise a complete 14 point review of systems was taken and is negative unless stated in the history above.    Physical Exam:   Blood pressure 136/69, pulse 81, height 1.6 m (5' 3\"), weight 72.3 kg (159 lb 6.4 oz), not currently breastfeeding.  General: well appearing, no acute distress, pleasant and conversant,   Mental Status/neuro: alert and oriented  Face: symmetrical, normal facial color  Eyes: anicteric, PERRL, no proptosis or lid lag  Back: no kyphosis  Neck: suppler, no lymphadenopahty  Thyroid: normal size and texture, no nodule palpable, no bruits  Heart: regular rhythm, S1S2, no murmur appreciated  Lung: clear to auscultation bilaterally  Abdomen: soft, NT/ND, no hepatomegaly  Legs: no swelling or edema        Labs (General):   Lab Results   Component Value Date     05/15/2017      Lab Results   Component Value Date    POTASSIUM 3.9 05/15/2017 "     Lab Results   Component Value Date    CHLORIDE 102 05/15/2017     Lab Results   Component Value Date    MELLISA 9.0 05/15/2017     Lab Results   Component Value Date    CO2 30 05/15/2017     Lab Results   Component Value Date    BUN 18 05/15/2017     Lab Results   Component Value Date    CR 0.77 05/15/2017     Lab Results   Component Value Date    GLC 99 05/15/2017     Lab Results   Component Value Date    TSH 1.84 02/03/2017     Lab Results   Component Value Date    T4 1.06 02/03/2017 5/1/2017 Bone density   Lumbar spine T-score in region of L1-L4, with the exception of L3 =  1.7   HIPS: Mean total hip T-score: -0.1     Left femoral neck T-score = -0.9  Right femoral neck T-score= -0.9      Radius 33% T-score = -2.1      Assessment and Plan  72 year old female with thyroid nodule 2 cm (AUS then benign), and osteopenia,  # lumber, femur seems OK (lumer could be degenerative change), but radius has osteopenia  - Continue current exercise/active life  - Continue calcium citrate plus Vitamin D tab (Ca 1260 mg, Vit D 1000 IU)  - Discussed other treatment options, and explained reported side effects and benefit, we will set up once a year Reclast infusion.   - Next sonogram 4/2018 was discussed previous visit  -RTC with me in 1 year after next bone density test.       I spent 30 minutes with this patient face to face and explained the conditions and plans (more than 50% of time was counseling/coordination of care, bone health, long term management of osteopenia) . The patient understood and is satisfied with today's visit. Return to clinic with me in 1 year.         Elizabeth Dey MD  Staff Physician  Endocrinology and Metabolism  License: [id number]    Again, thank you for allowing me to participate in the care of your patient.      Sincerely,    Elizabeth Dey MD

## 2017-05-19 NOTE — PROGRESS NOTES
- Endocrinology Initial Consultation -    Reason for visit/consult: osteopenia, thyroid nodule    Primary care provider: Lizett Parikh    HPI: 73 yo male with follow up right thyroid nodule 2 cm solid, first FNA was AUS, then repeated one was benign on 4/7/2017. She has remote history of endometriosis and had early menopause age 39.  No fractures, active life+.  Sent for bone density test, which came back osteopenia (T=-2.1).         Past Medical/Surgical History:  Past Medical History:   Diagnosis Date     Cervicalgia      Depressive disorder      Generalized osteoarthrosis, unspecified site      Migraine, unspecified, without mention of intractable migraine without mention of status migrainosus      Thyroid nodule 12/1/2016     Unspecified essential hypertension      Past Surgical History:   Procedure Laterality Date     HYSTERECTOMY, TANG       ORTHOPEDIC SURGERY       SOFT TISSUE SURGERY         Allergies:  Allergies   Allergen Reactions     Tylenol W/Codeine [Acetaminophen-Codeine] Itching     codeine     Percocet [Oxycodone-Acetaminophen] Rash     Sulfa Drugs Rash       Current Medications   Current Outpatient Prescriptions   Medication     MAGNESIUM PO     Probiotic Product (PROBIOTIC DAILY PO)     Black Pepper-Turmeric (TURMERIC COMPLEX/BLACK PEPPER PO)     omeprazole (PRILOSEC) 20 MG CR capsule     gabapentin (NEURONTIN) 100 MG capsule     hydrochlorothiazide (HYDRODIURIL) 25 MG tablet     acyclovir (ZOVIRAX) 400 MG tablet     FLUoxetine (PROZAC) 10 MG capsule     atorvastatin (LIPITOR) 40 MG tablet     CALCIUM-VITAMIN D PO     atorvastatin (LIPITOR) 40 MG tablet     SUMAtriptan (IMITREX) 50 MG tablet     triamcinolone (KENALOG) 0.1 % cream     Omega-3 Fatty Acids (OMEGA 3 PO)     No current facility-administered medications for this visit.        Family History:  Family History   Problem Relation Age of Onset     Arthritis Mother      Lipids Mother       "Hypertension Mother      Breast Cancer Mother      Anesthesia Reaction Mother      HEART DISEASE Father      CEREBROVASCULAR DISEASE Father      Arthritis Maternal Grandmother      Breast Cancer Maternal Aunt      Breast Cancer Maternal Aunt      DIABETES No family hx of      Coronary Artery Disease No family hx of      Hyperlipidemia No family hx of      Prostate Cancer No family hx of      Other Cancer No family hx of      Anxiety Disorder No family hx of      MENTAL ILLNESS No family hx of      Substance Abuse No family hx of        Social History:  Social History   Substance Use Topics     Smoking status: Never Smoker     Smokeless tobacco: Never Used     Alcohol use Yes      Comment: wine       ROS:  Full review of systems taken with the help of the intake sheet. Otherwise a complete 14 point review of systems was taken and is negative unless stated in the history above.    Physical Exam:   Blood pressure 136/69, pulse 81, height 1.6 m (5' 3\"), weight 72.3 kg (159 lb 6.4 oz), not currently breastfeeding.  General: well appearing, no acute distress, pleasant and conversant,   Mental Status/neuro: alert and oriented  Face: symmetrical, normal facial color  Eyes: anicteric, PERRL, no proptosis or lid lag  Back: no kyphosis  Neck: suppler, no lymphadenopahty  Thyroid: normal size and texture, no nodule palpable, no bruits  Heart: regular rhythm, S1S2, no murmur appreciated  Lung: clear to auscultation bilaterally  Abdomen: soft, NT/ND, no hepatomegaly  Legs: no swelling or edema        Labs (General):   Lab Results   Component Value Date     05/15/2017      Lab Results   Component Value Date    POTASSIUM 3.9 05/15/2017     Lab Results   Component Value Date    CHLORIDE 102 05/15/2017     Lab Results   Component Value Date    MELLISA 9.0 05/15/2017     Lab Results   Component Value Date    CO2 30 05/15/2017     Lab Results   Component Value Date    BUN 18 05/15/2017     Lab Results   Component Value Date    CR " 0.77 05/15/2017     Lab Results   Component Value Date    GLC 99 05/15/2017     Lab Results   Component Value Date    TSH 1.84 02/03/2017     Lab Results   Component Value Date    T4 1.06 02/03/2017 5/1/2017 Bone density   Lumbar spine T-score in region of L1-L4, with the exception of L3 =  1.7   HIPS: Mean total hip T-score: -0.1     Left femoral neck T-score = -0.9  Right femoral neck T-score= -0.9      Radius 33% T-score = -2.1      Assessment and Plan  72 year old female with thyroid nodule 2 cm (AUS then benign), and osteopenia,  # lumber, femur seems OK (lumer could be degenerative change), but radius has osteopenia  - Continue current exercise/active life  - Continue calcium citrate plus Vitamin D tab (Ca 1260 mg, Vit D 1000 IU)  - Discussed other treatment options, and explained reported side effects and benefit, we will set up once a year Reclast infusion.   - Next sonogram 4/2018 was discussed previous visit  -RTC with me in 1 year after next bone density test.       I spent 30 minutes with this patient face to face and explained the conditions and plans (more than 50% of time was counseling/coordination of care, bone health, long term management of osteopenia) . The patient understood and is satisfied with today's visit. Return to clinic with me in 1 year.         Elizabeth Dey MD  Staff Physician  Endocrinology and Metabolism  License: [id number]

## 2017-05-19 NOTE — MR AVS SNAPSHOT
After Visit Summary   5/19/2017    Nicole Slaughter    MRN: [id number]           Patient Information     Date Of Birth          1945        Visit Information        Provider Department      5/19/2017 10:30 AM Elizabeth Dye MD [facility]        Care Instructions    Thank you for choosing the [facility] Department of Endocrinology. It has been a pleasure servicing you today. Please contact us at [phone] with any questions. If you need to speak to an Endocrinologist and it is after 5:00 pm or on a weekend, please call the On-Call Endocrinologist at [phone].    To schedule or reschedule Reclast infusion please call [phone]        Follow-ups after your visit        Your next 10 appointments already scheduled     Jul 12, 2017 10:30 AM CDT   LAB with LAB ONC [facility] ([facility])    [address] [place] [address] [phone]           Patient must bring picture ID.  Patient should be prepared to give a urine specimen  Please do not eat 10-12 hours before your appointment if you are coming in fasting for labs on lipids, cholesterol, or glucose (sugar).  Pregnant women should follow their Care Team instructions. Water with medications is okay. Do not drink coffee or other fluids.   If you have concerns about taking  your medications, please ask at office or if scheduling via [facility], send a message by clicking on Secure Messaging, Message Your Care Team.            Jul 12, 2017 11:00 AM CDT   Level 2 with BAY 6 INFUSION   [facility])    [id number] [facility] [address] [phone]            Apr 16, 2018 11:30 AM CDT   US THYROID with MGUS1, MG US TECH   [facility])    [id number] [facility] [address] [phone]           Please bring a list of your medicines  (including vitamins, minerals and over-the-counter drugs). Also, tell your doctor about any allergies you may have. Wear comfortable clothes and leave your valuables at home.  You do not need to do anything special to prepare for your exam.  Please call the Imaging Department at your exam site with any questions.            Apr 27, 2018 11:30 AM CDT   Return Visit with Elizabeth Dey MD   [facility] ([facility])    [address] [place] [address] [phone]              Who to contact     If you have questions or need follow up information about today's clinic visit or your schedule please contact [facility] directly at [phone].  Normal or non-critical lab and imaging results will be communicated to you by [employer], letter or phone within 4 business days after the clinic has received the results. If you do not hear from us within 7 days, please contact the clinic through [employer] or phone. If you have a critical or abnormal lab result, we will notify you by phone as soon as possible.  Submit refill requests through alike or call your pharmacy and they will forward the refill request to us. Please allow 3 business days for your refill to be completed.          Additional Information About Your Visit        alike Information     alike gives you secure access to your electronic health record. If you see a primary care provider, you can also send messages to your care team and make appointments. If you have questions, please call your primary care clinic.  If you do not have a primary care provider, please call [phone] and they will assist you.      alike is an electronic gateway that provides easy, online access to your medical records. With alike, you can request a clinic appointment, read your test results, renew a prescription or communicate with your care team.     To access your existing account, please contact your  "[facility] or call [phone] for assistance.        Care EveryWhere ID     This is your Care EveryWhere ID. This could be used by other organizations to access your [facility] medical records  [id number]        Your Vitals Were     Pulse Height BMI (Body Mass Index)             81 1.6 m (5' 3\") 28.24 kg/m2          Blood Pressure from Last 3 Encounters:   05/19/17 136/69   04/21/17 122/70   02/03/17 142/80    Weight from Last 3 Encounters:   05/19/17 72.3 kg (159 lb 6.4 oz)   04/21/17 71.7 kg (158 lb)   02/03/17 71.9 kg (158 lb 8 oz)              Today, you had the following     No orders found for display       Primary Care Provider Office Phone # Fax #    Lizett Parikh [phone]       [facility] [address] [facility] [address]        Thank you!     Thank you for choosing [facility]  for your care. Our goal is always to provide you with excellent care. Hearing back from our patients is one way we can continue to improve our services. Please take a few minutes to complete the written survey that you may receive in the mail after your visit with us. Thank you!             Your Updated Medication List - Protect others around you: Learn how to safely use, store and throw away your medicines at www.disposemymeds.org.          This list is accurate as of: 5/19/17 11:09 AM.  Always use your most recent med list.                   Brand Name Dispense Instructions for use    acyclovir 400 MG tablet    ZOVIRAX    60 tablet    TAKE 1 TABLET (400 MG) BY ORAL ROUTE 2 TIMES PER DAY       * atorvastatin 40 MG tablet    LIPITOR    10 tablet    TAKE 1 TABLET (40 MG) BY MOUTH TWICE A WEEK       * atorvastatin 40 MG tablet    LIPITOR    10 tablet    TAKE 1 TABLET (40 MG) BY MOUTH TWICE A WEEK       CALCIUM-VITAMIN D PO      Take  by mouth daily.       FLUoxetine 10 MG capsule    PROzac    60 capsule    TAKE 2 CAPSULES (20 MG) BY MOUTH DAILY       " gabapentin 100 MG capsule    NEURONTIN    90 capsule    TAKE ONE CAPSULE BY MOUTH THREE TIMES DAILY       hydrochlorothiazide 25 MG tablet    HYDRODIURIL    90 tablet    TAKE ONE TABLET BY MOUTH ONE TIME DAILY       MAGNESIUM PO      Take by mouth daily       OMEGA 3 PO      Reported on 5/19/2017       omeprazole 20 MG CR capsule    priLOSEC    30 capsule    TAKE 1 CAPSULE (20 MG) BY ORAL ROUTE ONCE DAILY BEFORE A MEAL       PROBIOTIC DAILY PO      Take by mouth daily       SUMAtriptan 50 MG tablet    IMITREX    18 tablet    TAKE 1 TABLET AT ONSET OF HEADACHE.  MAY REPEAT IN 2 HOURS AS NEEDED . MAX. 2 TABLETS DAILY. AVERAGE 12 HEADACHES MONTHLY       triamcinolone 0.1 % cream    KENALOG    15 g    Apply topically 2 times daily as needed for irritation Apply sparingly to affected area three times daily for 14 days.       TURMERIC COMPLEX/BLACK PEPPER PO      Take by mouth daily Cameron paste       * Notice:  This list has 2 medication(s) that are the same as other medications prescribed for you. Read the directions carefully, and ask your doctor or other care provider to review them with you.

## 2017-05-19 NOTE — PATIENT INSTRUCTIONS
Thank you for choosing the [facility], Department of Endocrinology. It has been a pleasure servicing you today. Please contact us at [phone] with any questions. If you need to speak to an Endocrinologist and it is after 5:00 pm or on a weekend, please call the On-Call Endocrinologist at [phone].    To schedule or reschedule Reclast infusion please call [phone]

## 2017-05-19 NOTE — NURSING NOTE
"Nciole Slaughter's goals for this visit include:   Chief Complaint   Patient presents with     Osteoporosis       She requests these members of her care team be copied on today's visit information: Lizett Parikh      PCP: Lizett Parikh    Referring Provider:  No referring provider defined for this encounter.    Chief Complaint   Patient presents with     Osteoporosis       Initial /69  Pulse 81  Ht 1.6 m (5' 3\")  Wt 72.3 kg (159 lb 6.4 oz)  BMI 28.24 kg/m2 Estimated body mass index is 28.24 kg/(m^2) as calculated from the following:    Height as of this encounter: 1.6 m (5' 3\").    Weight as of this encounter: 72.3 kg (159 lb 6.4 oz).  Medication Reconciliation: complete    Do you need any medication refills at today's visit? No    Ora Dubose LPN      "

## 2017-06-21 DIAGNOSIS — E78.5 HYPERLIPIDEMIA LDL GOAL <130: ICD-10-CM

## 2017-06-21 NOTE — TELEPHONE ENCOUNTER
lipitor     Last Written Prescription Date: 05/08/17  Last Fill Quantity: 10, # refills: 0  Last Office Visit with [facility] or [facility] prescribing provider: 11/15/16       Lab Results   Component Value Date    CHOL 225 05/15/2017     Lab Results   Component Value Date    HDL 80 05/15/2017     Lab Results   Component Value Date     05/15/2017     Lab Results   Component Value Date    TRIG 81 05/15/2017     Lab Results   Component Value Date    CHOLHDLRATIO 3.0 03/24/2015

## 2017-06-26 RX ORDER — ATORVASTATIN CALCIUM 40 MG/1
TABLET, FILM COATED ORAL
Qty: 10 TABLET | Refills: 3 | Status: SHIPPED | OUTPATIENT
Start: 2017-06-26 | End: 2017-12-04

## 2017-06-26 NOTE — TELEPHONE ENCOUNTER
Prescription approved per [facility] Refill Protocol.  Gay Weir, RN, BSN

## 2017-06-30 DIAGNOSIS — I10 ESSENTIAL HYPERTENSION WITH GOAL BLOOD PRESSURE LESS THAN 140/90: ICD-10-CM

## 2017-07-03 RX ORDER — HYDROCHLOROTHIAZIDE 25 MG/1
TABLET ORAL
Qty: 90 TABLET | Refills: 1 | Status: SHIPPED | OUTPATIENT
Start: 2017-07-03 | End: 2017-10-04

## 2017-07-03 NOTE — TELEPHONE ENCOUNTER
Prescription approved per [facility] Refill Protocol.  Natasha Maradiaga RN, BSN

## 2017-07-03 NOTE — TELEPHONE ENCOUNTER
hydrochlorothiazide (HYDRODIURIL) 25 MG tablet      Last Written Prescription Date: 3/2/2017  Last Fill Quantity: 90, # refills: 0  Last Office Visit with [facility], [facility] or [facility] prescribing provider: 11/15/2016       Potassium   Date Value Ref Range Status   05/15/2017 3.9 3.4 - 5.3 mmol/L Final     Creatinine   Date Value Ref Range Status   05/15/2017 0.77 0.52 - 1.04 mg/dL Final     BP Readings from Last 3 Encounters:   05/19/17 136/69   04/21/17 122/70   02/03/17 142/80

## 2017-07-12 ENCOUNTER — INFUSION THERAPY VISIT (OUTPATIENT)
Dept: INFUSION THERAPY | Facility: CLINIC | Age: 72
End: 2017-07-12
Payer: COMMERCIAL

## 2017-07-12 VITALS
HEART RATE: 65 BPM | WEIGHT: 159.9 LBS | BODY MASS INDEX: 28.33 KG/M2 | OXYGEN SATURATION: 95 % | DIASTOLIC BLOOD PRESSURE: 77 MMHG | TEMPERATURE: 97.3 F | SYSTOLIC BLOOD PRESSURE: 128 MMHG | RESPIRATION RATE: 18 BRPM

## 2017-07-12 DIAGNOSIS — M85.80 OSTEOPENIA: Primary | ICD-10-CM

## 2017-07-12 PROCEDURE — 99207 ZZC NO CHARGE LOS: CPT

## 2017-07-12 PROCEDURE — 96365 THER/PROPH/DIAG IV INF INIT: CPT | Performed by: INTERNAL MEDICINE

## 2017-07-12 RX ORDER — ZOLEDRONIC ACID 5 MG/100ML
5 INJECTION, SOLUTION INTRAVENOUS ONCE
Status: CANCELLED
Start: 2017-07-12 | End: 2017-07-12

## 2017-07-12 RX ORDER — ZOLEDRONIC ACID 5 MG/100ML
5 INJECTION, SOLUTION INTRAVENOUS ONCE
Status: COMPLETED | OUTPATIENT
Start: 2017-07-12 | End: 2017-07-12

## 2017-07-12 RX ADMIN — Medication 100 ML: at 11:06

## 2017-07-12 RX ADMIN — ZOLEDRONIC ACID 5 MG: 0.05 INJECTION, SOLUTION INTRAVENOUS at 11:06

## 2017-07-12 ASSESSMENT — PAIN SCALES - GENERAL: PAINLEVEL: NO PAIN (0)

## 2017-07-12 NOTE — PROGRESS NOTES
Infusion Nursing Note:  Nicole Slaughter presents today for Reclast.    Patient seen by provider today: No   present during visit today: Not Applicable.    Note: N/A.    Intravenous Access:  Peripheral IV placed.    Treatment Conditions:  Results reviewed, labs MET treatment parameters, ok to proceed with treatment.      Post Infusion Assessment:  Patient tolerated infusion without incident.    MARELY BRIGHT RN

## 2017-07-12 NOTE — MR AVS SNAPSHOT
After Visit Summary   7/12/2017    Nicole Slaughter    MRN: [id number]           Patient Information     Date Of Birth          1945        Visit Information        Provider Department      7/12/2017 11:00 AM BAY [facility]        Today's Diagnoses     Osteopenia    -  1       Follow-ups after your visit        Your next 10 appointments already scheduled     Apr 16, 2018 11:30 AM CDT   US THYROID with MGUS1, MG US TECH   [facility])    [address] [address] [place] [id number] [phone]           Please bring a list of your medicines (including vitamins, minerals and over-the-counter drugs). Also, tell your doctor about any allergies you may have. Wear comfortable clothes and leave your valuables at home.  You do not need to do anything special to prepare for your exam.  Please call the Imaging Department at your exam site with any questions.            Apr 27, 2018 11:30 AM CDT   Return Visit with Elizabeth Dey MD   [facility])    [address] [address] [place] [id number] [phone]              Who to contact     If you have questions or need follow up information about today's clinic visit or your schedule please contact [facility] directly at [phone].  Normal or non-critical lab and imaging results will be communicated to you by MyChart, letter or phone within 4 business days after the clinic has received the results. If you do not hear from us within 7 days, please contact the clinic through MyChart or phone. If you have a critical or abnormal lab result, we will notify you by phone as soon as possible.  Submit refill requests through [employer] or call your pharmacy and they will forward the refill request to us. Please allow 3 business days for your refill to be completed.          Additional Information About Your Visit         [facility] Information     buySAFE gives you secure access to your electronic health record. If you see a primary care provider, you can also send messages to your care team and make appointments. If you have questions, please call your primary care clinic.  If you do not have a primary care provider, please call [phone] and they will assist you.      buySAFE is an electronic gateway that provides easy, online access to your medical records. With buySAFE, you can request a clinic appointment, read your test results, renew a prescription or communicate with your care team.     To access your existing account, please contact your [facility] or call [phone] for assistance.        Care EveryWhere ID     This is your Care EveryWhere ID. This could be used by other organizations to access your [facility] medical records  [id number]        Your Vitals Were     Pulse Temperature Respirations Pulse Oximetry BMI (Body Mass Index)       65 97.3  F (36.3  C) (Oral) 18 95% 28.33 kg/m2        Blood Pressure from Last 3 Encounters:   07/12/17 128/77   05/19/17 136/69   04/21/17 122/70    Weight from Last 3 Encounters:   07/12/17 72.5 kg (159 lb 14.4 oz)   05/19/17 72.3 kg (159 lb 6.4 oz)   04/21/17 71.7 kg (158 lb)              Today, you had the following     No orders found for display       Primary Care Provider Office Phone # Fax #    Lizett Parikh [phone]       [facility] [address] [facility] [address]        Equal Access to Services     THA MEYERS : Hadii aad ku hadasho Soomaali, waaxda luqadaha, qaybta kaalmada adeegyada, morena atkinson . So [facility] [phone].    ATENCIÓN: Si habla español, tiene a urena disposición servicios gratuitos de asistencia lingüística. Llame al [phone].    We comply with applicable federal civil rights laws and [place] laws. We do not discriminate on the basis of race, color, national  origin, age, disability sex, sexual orientation or gender identity.            Thank you!     Thank you for choosing [facility]  for your care. Our goal is always to provide you with excellent care. Hearing back from our patients is one way we can continue to improve our services. Please take a few minutes to complete the written survey that you may receive in the mail after your visit with us. Thank you!             Your Updated Medication List - Protect others around you: Learn how to safely use, store and throw away your medicines at www.disposemymeds.org.          This list is accurate as of: 7/12/17 11:41 AM.  Always use your most recent med list.                   Brand Name Dispense Instructions for use Diagnosis    acyclovir 400 MG tablet    ZOVIRAX    60 tablet    TAKE 1 TABLET (400 MG) BY ORAL ROUTE 2 TIMES PER DAY    Herpes simplex virus infection       * atorvastatin 40 MG tablet    LIPITOR    10 tablet    TAKE 1 TABLET (40 MG) BY MOUTH TWICE A WEEK    Hyperlipidemia LDL goal <130       * atorvastatin 40 MG tablet    LIPITOR    10 tablet    TAKE ONE TABLET BY MOUTH TWICE WEEKLY    Hyperlipidemia LDL goal <130       CALCIUM-VITAMIN D PO      Take  by mouth daily.        FLUoxetine 10 MG capsule    PROzac    60 capsule    TAKE 2 CAPSULES (20 MG) BY MOUTH DAILY    Major depressive disorder, recurrent episode, moderate (H)       gabapentin 100 MG capsule    NEURONTIN    90 capsule    TAKE ONE CAPSULE BY MOUTH THREE TIMES DAILY    Migraine without aura and without status migrainosus, not intractable       hydrochlorothiazide 25 MG tablet    HYDRODIURIL    90 tablet    TAKE 1 TABLET BY MOUTH ONE TIME DAILY    Essential hypertension with goal blood pressure less than 140/90       MAGNESIUM PO      Take by mouth daily        OMEGA 3 PO      Reported on 5/19/2017        omeprazole 20 MG CR capsule    priLOSEC    30 capsule    TAKE 1 CAPSULE (20 MG) BY ORAL ROUTE ONCE DAILY BEFORE A MEAL    GERD  (gastroesophageal reflux disease)       PROBIOTIC DAILY PO      Take by mouth daily        SUMAtriptan 50 MG tablet    IMITREX    18 tablet    TAKE 1 TABLET AT ONSET OF HEADACHE.  MAY REPEAT IN 2 HOURS AS NEEDED . MAX. 2 TABLETS DAILY. AVERAGE 12 HEADACHES MONTHLY    Migraine without aura and without status migrainosus, not intractable       triamcinolone 0.1 % cream    KENALOG    15 g    Apply topically 2 times daily as needed for irritation Apply sparingly to affected area three times daily for 14 days.    Contact dermatitis, unspecified contact dermatitis type, unspecified trigger       TURMERIC COMPLEX/BLACK PEPPER PO      Take by mouth daily Cameron paste        * Notice:  This list has 2 medication(s) that are the same as other medications prescribed for you. Read the directions carefully, and ask your doctor or other care provider to review them with you.

## 2017-08-14 DIAGNOSIS — G43.009 MIGRAINE WITHOUT AURA AND WITHOUT STATUS MIGRAINOSUS, NOT INTRACTABLE: ICD-10-CM

## 2017-08-14 NOTE — TELEPHONE ENCOUNTER
gabapentin (NEURONTIN) 100 MG capsule      Last Written Prescription Date:  7/6/17  Last Fill Quantity: 90,   # refills: 0  Last Office Visit with [facility] or [facility] prescribing provider: 11/15/16  Future Office visit:       Routing refill request to provider for review/approval because:  Drug not on the [facility], [facility] or [facility] refill protocol or controlled substance

## 2017-08-15 RX ORDER — GABAPENTIN 100 MG/1
CAPSULE ORAL
Qty: 90 CAPSULE | Refills: 1 | Status: SHIPPED | OUTPATIENT
Start: 2017-08-15 | End: 2017-10-04

## 2017-09-12 ENCOUNTER — TRANSFERRED RECORDS (OUTPATIENT)
Dept: HEALTH INFORMATION MANAGEMENT | Facility: CLINIC | Age: 72
End: 2017-09-12

## 2017-09-29 NOTE — PROGRESS NOTES
[facility] [place]  [address] [address]  [place] [address]  [phone]  Dept: [phone]    PRE-OP EVALUATION:  Today's date: 10/4/2017    Nicole Slaughter (: 1945) presents for pre-operative evaluation assessment as requested by Dr. German.  She requires evaluation and anesthesia risk assessment prior to undergoing surgery/procedure for treatment of cataracs .  Proposed procedure: cataract surgery    Date of Surgery/ Procedure: 2017  Time of Surgery/ Procedure: Unknown  Hospital/Surgical Facility: [facility]  Fax number for surgical facility:   Primary Physician: Lizett Parikh  Type of Anesthesia Anticipated: to be determined    Patient has a Health Care Directive or Living Will:  NO    Preop Questions 10/2/2017   1.  Do you have a history of heart attack, stroke, stent, bypass or surgery on an artery in the head, neck, heart or legs? No   2.  Do you ever have any pain or discomfort in your chest? No   3.  Do you have a history of  Heart Failure? No   4.   Are you troubled by shortness of breath when:  walking on a level surface, or up a slight hill, or at night? No   5.  Do you currently have a cold, bronchitis or other respiratory infection? No   6.  Do you have a cough, shortness of breath, or wheezing? No   7.  Do you sometimes get pains in the calves of your legs when you walk? No   8. Do you or anyone in your family have previous history of blood clots? No   9.  Do you or does anyone in your family have a serious bleeding problem such as prolonged bleeding following surgeries or cuts? No   10. Have you ever had problems with anemia or been told to take iron pills? No   11. Have you had any abnormal blood loss such as black, tarry or bloody stools, or abnormal vaginal bleeding? No   12. Have you ever had a blood transfusion? No   13. Have you or any of your relatives ever had problems with anesthesia? YES - pt gets very ill. vomiting   14.  Do you have sleep apnea, excessive snoring or daytime drowsiness? YES - pt experiences daytime drowsiness   15. Do you have any prosthetic heart valves? No   16. Do you have prosthetic joints? No   17. Is there any chance that you may be pregnant? No           HPI:                                                      Brief HPI related to upcoming procedure: patient has developed cataracts. She is having her first surgery for cataract removal.       HYPERTENSION - Patient has longstanding history of mod-severe HTN , currently denies any symptoms referable to elevated blood pressure. Specifically denies chest pain, palpitations, dyspnea, orthopnea, PND or peripheral edema. Blood pressure readings have been in normal range. Current medication regimen is as listed below. Patient denies any side effects of medication.                                                                                                                                                                                          .  HYPERLIPIDEMIA - Patient has a long history of significant Hyperlipidemia requiring medication for treatment with recent good control. Patient reports no problems or side effects with the medication.                                                                                                                                                       .  DEPRESSION - Patient has a long history of Depression of moderate severity requiring medication for control with recent symptoms being stable..Current symptoms of depression include none.         MIGRAINES-  Doing well with gabapentin overall. Would like to continue with this.                                                                                                                                                                                .    MEDICAL HISTORY:                                                    Patient Active Problem List    Diagnosis Date Noted      Osteopenia 05/19/2017     Priority: Medium     Early menopause 05/19/2017     Priority: Medium     Thyroid nodule 12/01/2016     Priority: Medium     Depression, major, in remission (H) 12/19/2014     Priority: Medium     Advanced directives, counseling/discussion 01/11/2013     Priority: Medium     Discussed advance care planning with patient; however, patient declined at this time.          Health Care Home 06/13/2012     Priority: Medium     Samira German RN-PHN  FPA / ZEN [facility]   [phone]    DX V65.8 REPLACED WITH [id number] [facility]ALTH CARE HOME (04/08/2013)       GERD (gastroesophageal reflux disease) 07/06/2011     Priority: Medium     Knee pain 06/09/2011     Priority: Medium     Back pain 06/09/2011     Priority: Medium     Neck pain 06/09/2011     Priority: Medium     Arthritis of hand, degenerative 06/09/2011     Priority: Medium     Hypertension goal BP (blood pressure) < 140/90 12/13/2010     Priority: Medium     Migraine headache 12/13/2010     Priority: Medium     Dr. Megan novoa [facility].  (Problem list name updated by automated process. Provider to review and confirm.)       Hyperlipidemia LDL goal <130 12/13/2010     Priority: Medium     Generalized osteoarthrosis, unspecified site 02/26/2008     Priority: Medium     Arthropathy 02/26/2008     Priority: Medium     Problem list name updated by automated process. Provider to review        Past Medical History:   Diagnosis Date     Cervicalgia      Depressive disorder      Generalized osteoarthrosis, unspecified site      Migraine, unspecified, without mention of intractable migraine without mention of status migrainosus      Thyroid nodule 12/1/2016     Unspecified essential hypertension      Past Surgical History:   Procedure Laterality Date     HYSTERECTOMY, TANG       ORTHOPEDIC SURGERY       SOFT TISSUE SURGERY       Current Outpatient Prescriptions   Medication Sig Dispense Refill     hydrochlorothiazide (HYDRODIURIL) 25 MG  tablet TAKE 1 TABLET BY MOUTH ONE TIME DAILY 90 tablet 1     gabapentin (NEURONTIN) 100 MG capsule TAKE ONE CAPSULE BY MOUTH THREE TIMES DAILY 270 capsule 1     acyclovir (ZOVIRAX) 400 MG tablet TAKE 1 TABLET (400 MG) BY ORAL ROUTE 2 TIMES PER DAY 60 tablet 4     MAGNESIUM PO Take by mouth daily       Probiotic Product (PROBIOTIC DAILY PO) Take by mouth daily       Black Pepper-Turmeric (TURMERIC COMPLEX/BLACK PEPPER PO) Take by mouth daily Cameron paste       omeprazole (PRILOSEC) 20 MG CR capsule TAKE 1 CAPSULE (20 MG) BY ORAL ROUTE ONCE DAILY BEFORE A MEAL 30 capsule 5     atorvastatin (LIPITOR) 40 MG tablet TAKE 1 TABLET (40 MG) BY MOUTH TWICE A WEEK 10 tablet 6     CALCIUM-VITAMIN D PO Take  by mouth daily.       [DISCONTINUED] gabapentin (NEURONTIN) 100 MG capsule TAKE ONE CAPSULE BY MOUTH THREE TIMES DAILY  90 capsule 1     [DISCONTINUED] hydrochlorothiazide (HYDRODIURIL) 25 MG tablet TAKE 1 TABLET BY MOUTH ONE TIME DAILY 90 tablet 1     atorvastatin (LIPITOR) 40 MG tablet TAKE ONE TABLET BY MOUTH TWICE WEEKLY  10 tablet 3     SUMAtriptan (IMITREX) 50 MG tablet TAKE 1 TABLET AT ONSET OF HEADACHE.  MAY REPEAT IN 2 HOURS AS NEEDED . MAX. 2 TABLETS DAILY. AVERAGE 12 HEADACHES MONTHLY (Patient not taking: Reported on 4/21/2017) 18 tablet 3     [DISCONTINUED] acyclovir (ZOVIRAX) 400 MG tablet TAKE 1 TABLET (400 MG) BY ORAL ROUTE 2 TIMES PER DAY 60 tablet 4     triamcinolone (KENALOG) 0.1 % cream Apply topically 2 times daily as needed for irritation Apply sparingly to affected area three times daily for 14 days. (Patient not taking: Reported on 4/21/2017) 15 g 0     Omega-3 Fatty Acids (OMEGA 3 PO) Reported on 5/19/2017       OTC products: None, except as noted above    Allergies   Allergen Reactions     Tylenol W/Codeine [Acetaminophen-Codeine] Itching     codeine     Percocet [Oxycodone-Acetaminophen] Rash     Sulfa Drugs Rash      Latex Allergy: NO    Social History   Substance Use Topics     Smoking status:  "Never Smoker     Smokeless tobacco: Never Used     Alcohol use Yes      Comment: wine     History   Drug Use No       REVIEW OF SYSTEMS:                                                    C: NEGATIVE for fever, chills, change in weight  I: NEGATIVE for worrisome rashes, moles or lesions  E: NEGATIVE for vision changes or irritation  E/M: NEGATIVE for ear, mouth and throat problems  R: NEGATIVE for significant cough or SOB  B: NEGATIVE for masses, tenderness or discharge  CV: NEGATIVE for chest pain, palpitations or peripheral edema  GI: NEGATIVE for nausea, abdominal pain, heartburn, or change in bowel habits  : NEGATIVE for frequency, dysuria, or hematuria  M: NEGATIVE for significant arthralgias or myalgia  N: NEGATIVE for weakness, dizziness or paresthesias  E: NEGATIVE for temperature intolerance, skin/hair changes  H: NEGATIVE for bleeding problems  P: NEGATIVE for changes in mood or affect    EXAM:                                                    /72  Pulse 68  Temp 97.8  F (36.6  C) (Temporal)  Resp 16  Ht 5' 3.39\" (1.61 m)  Wt 161 lb 12.8 oz (73.4 kg)  SpO2 97%  BMI 28.31 kg/m2    GENERAL APPEARANCE: healthy, alert and no distress     EYES: EOMI, PERRL     HENT: ear canals and TM's normal and nose and mouth without ulcers or lesions     NECK: no adenopathy, no asymmetry, masses, or scars and thyroid normal to palpation     RESP: lungs clear to auscultation - no rales, rhonchi or wheezes     CV: regular rates and rhythm, normal S1 S2, no S3 or S4 and no murmur, click or rub     ABDOMEN:  soft, nontender, no HSM or masses and bowel sounds normal     MS: extremities normal- no gross deformities noted, no evidence of inflammation in joints, FROM in all extremities.     SKIN: no suspicious lesions or rashes     NEURO: Normal strength and tone, sensory exam grossly normal, mentation intact and speech normal     PSYCH: mentation appears normal. and affect normal/bright    DIAGNOSTICS:               "                                      No labs or EKG required for low risk surgery (cataract, skin procedure, breast biopsy, etc)    Recent Labs   Lab Test  05/15/17   0851  04/29/16   0923   NA  141  139   POTASSIUM  3.9  3.6   CR  0.77  0.67        IMPRESSION:                                                    Reason for surgery/procedure: cataracts  Diagnosis/reason for consult: hypertension, hyperlipidemia, major depression in remission, migraine    The proposed surgical procedure is considered LOW risk.    REVISED CARDIAC RISK INDEX  The patient has the following serious cardiovascular risks for perioperative complications such as (MI, PE, VFib and 3  AV Block):  No serious cardiac risks  INTERPRETATION: 0 risks: Class I (very low risk - 0.4% complication rate)    The patient has the following additional risks for perioperative complications:  No identified additional risks      ICD-10-CM    1. Preop general physical exam Z01.818    2. Age-related cataract of both eyes, unspecified age-related cataract type H25.9    3. Hypertension goal BP (blood pressure) < 140/90 I10 hydrochlorothiazide (HYDRODIURIL) 25 MG tablet   4. Hyperlipidemia LDL goal <130 E78.5    5. Depression, major, in remission (H) F32.5    6. Herpes simplex virus infection B00.9 acyclovir (ZOVIRAX) 400 MG tablet   7. Migraine without aura and without status migrainosus, not intractable G43.009 gabapentin (NEURONTIN) 100 MG capsule   8. Skin tag L91.8      Will return for skin tag removal.       RECOMMENDATIONS:                                                          --Patient is to take all scheduled medications on the day of surgery     APPROVAL GIVEN to proceed with proposed procedure, without further diagnostic evaluation       Signed Electronically by: JOE GOMEZ MD, MD    Copy of this evaluation report is provided to requesting physician.    [facility] Preop Guidelines

## 2017-10-04 ENCOUNTER — OFFICE VISIT (OUTPATIENT)
Dept: FAMILY MEDICINE | Facility: CLINIC | Age: 72
End: 2017-10-04
Payer: COMMERCIAL

## 2017-10-04 VITALS
TEMPERATURE: 97.8 F | DIASTOLIC BLOOD PRESSURE: 72 MMHG | BODY MASS INDEX: 28.67 KG/M2 | WEIGHT: 161.8 LBS | SYSTOLIC BLOOD PRESSURE: 124 MMHG | OXYGEN SATURATION: 97 % | RESPIRATION RATE: 16 BRPM | HEART RATE: 68 BPM | HEIGHT: 63 IN

## 2017-10-04 DIAGNOSIS — B00.9 HERPES SIMPLEX VIRUS INFECTION: ICD-10-CM

## 2017-10-04 DIAGNOSIS — E78.5 HYPERLIPIDEMIA LDL GOAL <130: ICD-10-CM

## 2017-10-04 DIAGNOSIS — I10 HYPERTENSION GOAL BP (BLOOD PRESSURE) < 140/90: ICD-10-CM

## 2017-10-04 DIAGNOSIS — H25.9 AGE-RELATED CATARACT OF BOTH EYES, UNSPECIFIED AGE-RELATED CATARACT TYPE: ICD-10-CM

## 2017-10-04 DIAGNOSIS — G43.009 MIGRAINE WITHOUT AURA AND WITHOUT STATUS MIGRAINOSUS, NOT INTRACTABLE: ICD-10-CM

## 2017-10-04 DIAGNOSIS — L91.8 SKIN TAG: ICD-10-CM

## 2017-10-04 DIAGNOSIS — Z01.818 PREOP GENERAL PHYSICAL EXAM: Primary | ICD-10-CM

## 2017-10-04 DIAGNOSIS — F32.5 DEPRESSION, MAJOR, IN REMISSION (H): ICD-10-CM

## 2017-10-04 PROCEDURE — 99214 OFFICE O/P EST MOD 30 MIN: CPT | Performed by: FAMILY MEDICINE

## 2017-10-04 RX ORDER — HYDROCHLOROTHIAZIDE 25 MG/1
TABLET ORAL
Qty: 90 TABLET | Refills: 1 | Status: SHIPPED | OUTPATIENT
Start: 2017-10-04 | End: 2018-06-23

## 2017-10-04 RX ORDER — ACYCLOVIR 400 MG/1
TABLET ORAL
Qty: 60 TABLET | Refills: 4 | Status: SHIPPED | OUTPATIENT
Start: 2017-10-04 | End: 2018-11-29

## 2017-10-04 RX ORDER — GABAPENTIN 100 MG/1
CAPSULE ORAL
Qty: 270 CAPSULE | Refills: 1 | Status: SHIPPED | OUTPATIENT
Start: 2017-10-04 | End: 2018-04-15

## 2017-10-04 ASSESSMENT — PAIN SCALES - GENERAL: PAINLEVEL: NO PAIN (0)

## 2017-10-04 NOTE — MR AVS SNAPSHOT
After Visit Summary   10/4/2017    Nicole Slaughter    MRN: [id number]           Patient Information     Date Of Birth          1945        Visit Information        Provider Department      10/4/2017 1:20 PM Lizett Parikh MD [facility]        Today's Diagnoses     Preop general physical exam    -  1    Age-related cataract of both eyes, unspecified age-related cataract type        Hypertension goal BP (blood pressure) < 140/90        Hyperlipidemia LDL goal <130        Depression, major, in remission (H)        Herpes simplex virus infection        Essential hypertension with goal blood pressure less than 140/90        Migraine without aura and without status migrainosus, not intractable        Skin tag          Care Instructions      Before Your Surgery      Call your surgeon if there is any change in your health. This includes signs of a cold or flu (such as a sore throat, runny nose, cough, rash or fever).    Do not smoke, drink alcohol or take over the counter medicine (unless your surgeon or primary care doctor tells you to) for the 24 hours before and after surgery.    If you take prescribed drugs: Follow your doctor s orders about which medicines to take and which to stop until after surgery.    Eating and drinking prior to surgery: follow the instructions from your surgeon    Take a shower or bath the night before surgery. Use the soap your surgeon gave you to gently clean your skin. If you do not have soap from your surgeon, use your regular soap. Do not shave or scrub the surgery site.  Wear clean pajamas and have clean sheets on your bed.           Follow-ups after your visit        Your next 10 appointments already scheduled     Apr 16, 2018 11:30 AM CDT   US THYROID with MGUS1, MG US TECH   [facility] ([facility])    [address] [place] [address] [phone]           Please bring a list of your medicines  (including vitamins, minerals and over-the-counter drugs). Also, tell your doctor about any allergies you may have. Wear comfortable clothes and leave your valuables at home.  You do not need to do anything special to prepare for your exam.  Please call the Imaging Department at your exam site with any questions.            Apr 27, 2018 11:30 AM CDT   [facility] Visit with [facility] Dey MD   [facility]    [address] [place] [address] [phone]              Who to contact     If you have questions or need follow up information about today's clinic visit or your schedule please contact [facility] directly at [phone].  Normal or non-critical lab and imaging results will be communicated to you by [employer], letter or phone within 4 business days after the clinic has received the results. If you do not hear from us within 7 days, please contact the clinic through [employer] or phone. If you have a critical or abnormal lab result, we will notify you by phone as soon as possible.  Submit refill requests through [employer] or call your pharmacy and they will forward the refill request to us. Please allow 3 business days for your refill to be completed.          Additional Information About Your Visit        [employer] Information     [employer] gives you secure access to your electronic health record. If you see a primary care provider, you can also send messages to your care team and make appointments. If you have questions, please call your primary care clinic.  If you do not have a primary care provider, please call [phone] and they will assist you.        Care EveryWhere ID     This is your Care EveryWhere ID. This could be used by other organizations to access your [facility] medical records  [id number]        Your Vitals Were     Pulse Temperature Respirations Height Pulse Oximetry BMI (Body Mass Index)    68 97.8  F (36.6  C) (Temporal) 16  "5' 3.39\" (1.61 m) 97% 28.31 kg/m2       Blood Pressure from Last 3 Encounters:   10/04/17 124/72   07/12/17 128/77   05/19/17 136/69    Weight from Last 3 Encounters:   10/04/17 161 lb 12.8 oz (73.4 kg)   07/12/17 159 lb 14.4 oz (72.5 kg)   05/19/17 159 lb 6.4 oz (72.3 kg)              Today, you had the following     No orders found for display         Today's Medication Changes          These changes are accurate as of: 10/4/17  2:10 PM.  If you have any questions, ask your nurse or doctor.               These medicines have changed or have updated prescriptions.        Dose/Directions    acyclovir 400 MG tablet   Commonly known as:  ZOVIRAX   This may have changed:  See the new instructions.   Used for:  Herpes simplex virus infection   Changed by:  Lizett Parikh MD        TAKE 1 TABLET (400 MG) BY ORAL ROUTE 2 TIMES PER DAY   Quantity:  60 tablet   Refills:  4       gabapentin 100 MG capsule   Commonly known as:  NEURONTIN   This may have changed:  See the new instructions.   Used for:  Migraine without aura and without status migrainosus, not intractable   Changed by:  Lizett Parikh MD        TAKE ONE CAPSULE BY MOUTH THREE TIMES DAILY   Quantity:  270 capsule   Refills:  1       hydrochlorothiazide 25 MG tablet   Commonly known as:  HYDRODIURIL   This may have changed:  See the new instructions.   Used for:  Essential hypertension with goal blood pressure less than 140/90   Changed by:  Lizett Parikh MD        TAKE 1 TABLET BY MOUTH ONE TIME DAILY   Quantity:  90 tablet   Refills:  1         Stop taking these medicines if you haven't already. Please contact your care team if you have questions.     FLUoxetine 10 MG capsule   Commonly known as:  PROzac   Stopped by:  Lizett Parikh MD                Where to get your medicines      Some of these will need a paper prescription and others can be bought over the counter.  Ask your nurse if you have questions.     Bring a paper prescription for " each of these medications     acyclovir 400 MG tablet    gabapentin 100 MG capsule    hydrochlorothiazide 25 MG tablet                Primary Care Provider Office Phone # Fax #    Lizett Parikh [phone] [address] [facility] [address]        Equal Access to Services     [facility] MIRA : Hadii aad ku hadasho Soomaali, waaxda luqadaha, qaybta kaalmada adeegyada, waxay idiin hayaan adeeg khroldanmarj ladarlene . So [facility] [phone].    ATENCIÓN: Si habla español, tiene a urena disposición servicios gratuitos de asistencia lingüística. Aicha al [phone].    We comply with applicable federal civil rights laws and [place] laws. We do not discriminate on the basis of race, color, national origin, age, disability, sex, sexual orientation, or gender identity.            Thank you!     Thank you for choosing [facility]  for your care. Our goal is always to provide you with excellent care. Hearing back from our patients is one way we can continue to improve our services. Please take a few minutes to complete the written survey that you may receive in the mail after your visit with us. Thank you!             Your Updated Medication List - Protect others around you: Learn how to safely use, store and throw away your medicines at www.disposemymeds.org.          This list is accurate as of: 10/4/17  2:10 PM.  Always use your most recent med list.                   Brand Name Dispense Instructions for use Diagnosis    acyclovir 400 MG tablet    ZOVIRAX    60 tablet    TAKE 1 TABLET (400 MG) BY ORAL ROUTE 2 TIMES PER DAY    Herpes simplex virus infection       * atorvastatin 40 MG tablet    LIPITOR    10 tablet    TAKE 1 TABLET (40 MG) BY MOUTH TWICE A WEEK    Hyperlipidemia LDL goal <130       * atorvastatin 40 MG tablet    LIPITOR    10 tablet    TAKE ONE TABLET BY MOUTH TWICE WEEKLY    Hyperlipidemia LDL goal <130       CALCIUM-VITAMIN D PO      Take  by mouth daily.        gabapentin  100 MG capsule    NEURONTIN    270 capsule    TAKE ONE CAPSULE BY MOUTH THREE TIMES DAILY    Migraine without aura and without status migrainosus, not intractable       hydrochlorothiazide 25 MG tablet    HYDRODIURIL    90 tablet    TAKE 1 TABLET BY MOUTH ONE TIME DAILY    Essential hypertension with goal blood pressure less than 140/90       MAGNESIUM PO      Take by mouth daily        OMEGA 3 PO      Reported on 5/19/2017        omeprazole 20 MG CR capsule    priLOSEC    30 capsule    TAKE 1 CAPSULE (20 MG) BY ORAL ROUTE ONCE DAILY BEFORE A MEAL    GERD (gastroesophageal reflux disease)       PROBIOTIC DAILY PO      Take by mouth daily        SUMAtriptan 50 MG tablet    IMITREX    18 tablet    TAKE 1 TABLET AT ONSET OF HEADACHE.  MAY REPEAT IN 2 HOURS AS NEEDED . MAX. 2 TABLETS DAILY. AVERAGE 12 HEADACHES MONTHLY    Migraine without aura and without status migrainosus, not intractable       triamcinolone 0.1 % cream    KENALOG    15 g    Apply topically 2 times daily as needed for irritation Apply sparingly to affected area three times daily for 14 days.    Contact dermatitis, unspecified contact dermatitis type, unspecified trigger       TURMERIC COMPLEX/BLACK PEPPER PO      Take by mouth daily Cameron paste        * Notice:  This list has 2 medication(s) that are the same as other medications prescribed for you. Read the directions carefully, and ask your doctor or other care provider to review them with you.

## 2017-10-11 ENCOUNTER — TRANSFERRED RECORDS (OUTPATIENT)
Dept: HEALTH INFORMATION MANAGEMENT | Facility: CLINIC | Age: 72
End: 2017-10-11

## 2017-11-08 ENCOUNTER — TRANSFERRED RECORDS (OUTPATIENT)
Dept: HEALTH INFORMATION MANAGEMENT | Facility: CLINIC | Age: 72
End: 2017-11-08

## 2017-12-04 DIAGNOSIS — E78.5 HYPERLIPIDEMIA LDL GOAL <130: ICD-10-CM

## 2017-12-05 RX ORDER — ATORVASTATIN CALCIUM 40 MG/1
TABLET, FILM COATED ORAL
Qty: 10 TABLET | Refills: 1 | Status: SHIPPED | OUTPATIENT
Start: 2017-12-05 | End: 2018-02-28

## 2017-12-05 NOTE — TELEPHONE ENCOUNTER
Requested Prescriptions   Pending Prescriptions Disp Refills     atorvastatin (LIPITOR) 40 MG tablet [Pharmacy Med Name: Atorvastatin Calcium Oral Tablet 40 MG] 10 tablet 2     Sig: take 1 tablet twice weekly    Statins Protocol Passed    12/4/2017 11:29 AM       Passed - LDL on file in past 12 months    Recent Labs   Lab Test  05/15/17   0851   LDL  129*            Passed - No abnormal creatine kinase in past 12 months    No lab results found.         Passed - Recent or future visit with authorizing provider    Patient had office visit in the last year or has a visit in the next 30 days with authorizing provider.  See chart review.       10/04/2017       Passed - Patient is age 18 or older       Passed - No active pregnancy on record       Passed - No positive pregnancy test in past 12 months        Prescription approved per [facility] Refill Protocol.  Nolberto Rand, RN, BSN

## 2017-12-26 ENCOUNTER — TRANSFERRED RECORDS (OUTPATIENT)
Dept: HEALTH INFORMATION MANAGEMENT | Facility: CLINIC | Age: 72
End: 2017-12-26

## 2017-12-27 DIAGNOSIS — G43.009 MIGRAINE WITHOUT AURA AND WITHOUT STATUS MIGRAINOSUS, NOT INTRACTABLE: ICD-10-CM

## 2017-12-30 RX ORDER — SUMATRIPTAN 50 MG/1
TABLET, FILM COATED ORAL
Qty: 18 TABLET | Refills: 2 | Status: SHIPPED | OUTPATIENT
Start: 2017-12-30 | End: 2018-07-31

## 2017-12-30 NOTE — TELEPHONE ENCOUNTER
Requested Prescriptions   Pending Prescriptions Disp Refills     SUMAtriptan (IMITREX) 50 MG tablet [Pharmacy Med Name: SUMAtriptan Succinate Oral Tablet 50 MG] 18 tablet 2     Sig: take 1 tablet at onset of headache may repeat in 2 hours as needed max 2 tabs/day    There is no refill protocol information for this order        10/04/2017    Prescription approved per [facility] Refill Protocol.  Nolberto Rand, RN, BSN

## 2018-02-28 DIAGNOSIS — E78.5 HYPERLIPIDEMIA LDL GOAL <130: ICD-10-CM

## 2018-02-28 RX ORDER — ATORVASTATIN CALCIUM 40 MG/1
TABLET, FILM COATED ORAL
Qty: 10 TABLET | Refills: 0 | Status: SHIPPED | OUTPATIENT
Start: 2018-02-28 | End: 2018-04-02

## 2018-02-28 NOTE — TELEPHONE ENCOUNTER
Atorvastatin    Prescription approved per [facility] [place].    Suki Camargo, RN, BSN

## 2018-04-02 DIAGNOSIS — E78.5 HYPERLIPIDEMIA LDL GOAL <130: ICD-10-CM

## 2018-04-04 RX ORDER — ATORVASTATIN CALCIUM 40 MG/1
TABLET, FILM COATED ORAL
Qty: 10 TABLET | Refills: 0 | Status: SHIPPED | OUTPATIENT
Start: 2018-04-04 | End: 2018-05-21

## 2018-04-04 NOTE — TELEPHONE ENCOUNTER
atorvastatin (LIPITOR) 40 MG tablet  Recent Labs   Lab Test  05/15/17   0851  04/29/16   0923  03/24/15   0852  12/17/14   0928   CHOL  225*  195  203*  164   HDL  80  60  67  62   LDL  129*  113*  109  83   TRIG  81  112  134  97   CHOLHDLRATIO   --    --   3.0  2.6     Medication is being filled for 1 time refill only due to:  Future labs ordered fasting lipid panel 05/2018. Patient needs to be seen because due for routine physical with fasting labs and to address mood.   PHQ-9 SCORE 5/11/2016 9/7/2016 11/15/2016   Total Score - - -   Total Score MyChart 7 (Mild depression) - -   Total Score 7 3 3     Next 5 appointments (look out 90 days)     Apr 27, 2018 11:30 AM CDT   [facility]    [address] [place] [address] [phone]                Please assist with scheduling.    Natasha Maradiaga, RN, BSN

## 2018-04-16 ENCOUNTER — RADIANT APPOINTMENT (OUTPATIENT)
Dept: ULTRASOUND IMAGING | Facility: CLINIC | Age: 73
End: 2018-04-16
Attending: INTERNAL MEDICINE
Payer: COMMERCIAL

## 2018-04-16 ENCOUNTER — RADIANT APPOINTMENT (OUTPATIENT)
Dept: MAMMOGRAPHY | Facility: CLINIC | Age: 73
End: 2018-04-16
Attending: FAMILY MEDICINE
Payer: COMMERCIAL

## 2018-04-16 DIAGNOSIS — Z12.39 SCREENING FOR BREAST CANCER: ICD-10-CM

## 2018-04-16 DIAGNOSIS — E04.1 THYROID NODULE: ICD-10-CM

## 2018-04-16 PROCEDURE — 77067 SCR MAMMO BI INCL CAD: CPT | Performed by: STUDENT IN AN ORGANIZED HEALTH CARE EDUCATION/TRAINING PROGRAM

## 2018-04-16 PROCEDURE — 77063 BREAST TOMOSYNTHESIS BI: CPT | Performed by: STUDENT IN AN ORGANIZED HEALTH CARE EDUCATION/TRAINING PROGRAM

## 2018-04-16 PROCEDURE — 76536 US EXAM OF HEAD AND NECK: CPT | Performed by: RADIOLOGY

## 2018-04-27 ENCOUNTER — OFFICE VISIT (OUTPATIENT)
Dept: ENDOCRINOLOGY | Facility: CLINIC | Age: 73
End: 2018-04-27
Payer: COMMERCIAL

## 2018-04-27 VITALS
BODY MASS INDEX: 29.2 KG/M2 | HEIGHT: 63 IN | WEIGHT: 164.8 LBS | SYSTOLIC BLOOD PRESSURE: 136 MMHG | HEART RATE: 99 BPM | DIASTOLIC BLOOD PRESSURE: 80 MMHG

## 2018-04-27 DIAGNOSIS — L65.9 HAIR LOSS: ICD-10-CM

## 2018-04-27 DIAGNOSIS — E04.1 THYROID NODULE: Primary | ICD-10-CM

## 2018-04-27 DIAGNOSIS — M85.80 OSTEOPENIA, UNSPECIFIED LOCATION: ICD-10-CM

## 2018-04-27 DIAGNOSIS — R63.5 WEIGHT GAIN: ICD-10-CM

## 2018-04-27 LAB
T4 FREE SERPL-MCNC: 1.05 NG/DL (ref 0.76–1.46)
TSH SERPL DL<=0.005 MIU/L-ACNC: 1.78 MU/L (ref 0.4–4)

## 2018-04-27 PROCEDURE — 36415 COLL VENOUS BLD VENIPUNCTURE: CPT | Performed by: INTERNAL MEDICINE

## 2018-04-27 PROCEDURE — 99214 OFFICE O/P EST MOD 30 MIN: CPT | Performed by: INTERNAL MEDICINE

## 2018-04-27 PROCEDURE — 84443 ASSAY THYROID STIM HORMONE: CPT | Performed by: INTERNAL MEDICINE

## 2018-04-27 PROCEDURE — 84439 ASSAY OF FREE THYROXINE: CPT | Performed by: INTERNAL MEDICINE

## 2018-04-27 NOTE — MR AVS SNAPSHOT
After Visit Summary   4/27/2018    Nicole Slaughter    MRN: [id number]           Patient Information     Date Of Birth          1945        Visit Information        Provider Department      4/27/2018 11:30 AM Elizabeth Dey MD [facility]         Follow-ups after your visit        Your next 10 appointments already scheduled     Apr 26, 2019 11:30 AM CDT   Return Visit with Elizabeth Dey MD   [facility] ([facility])    [address] [place] [address] [phone]              Who to contact     If you have questions or need follow up information about today's clinic visit or your schedule please contact [facility] directly at [phone].  Normal or non-critical lab and imaging results will be communicated to you by MyChart, letter or phone within 4 business days after the clinic has received the results. If you do not hear from us within 7 days, please contact the clinic through [employer] or phone. If you have a critical or abnormal lab result, we will notify you by phone as soon as possible.  Submit refill requests through [employer] or call your pharmacy and they will forward the refill request to us. Please allow 3 business days for your refill to be completed.          Additional Information About Your Visit        [employer] Information     [employer] gives you secure access to your electronic health record. If you see a primary care provider, you can also send messages to your care team and make appointments. If you have questions, please call your primary care clinic.  If you do not have a primary care provider, please call [phone] and they will assist you.      [employer] is an electronic gateway that provides easy, online access to your medical records. With [employer], you can request a clinic appointment, read your test results, renew a prescription or communicate with your care team.     To access your  "existing account, please contact your [facility] or call [phone] for assistance.        Care EveryWhere ID     This is your Care EveryWhere ID. This could be used by other organizations to access your [facility] medical records  [id number]        Your Vitals Were     Pulse Height BMI (Body Mass Index)             99 1.6 m (5' 3\") 29.19 kg/m2          Blood Pressure from Last 3 Encounters:   04/27/18 136/80   10/04/17 124/72   07/12/17 128/77    Weight from Last 3 Encounters:   04/27/18 74.8 kg (164 lb 12.8 oz)   10/04/17 73.4 kg (161 lb 12.8 oz)   07/12/17 72.5 kg (159 lb 14.4 oz)              Today, you had the following     No orders found for display       Primary Care Provider Office Phone # Fax #    Lizett Parikh [phone] [address] [facility] [address]        Equal Access to Services     BRANDIE MEYERS : Hadii aad ku hadasho Soomaali, waaxda luqadaha, qaybta kaalmada adeegyada, waxay idiin hayadánn stanley shinaramarj atkinson . So [facility] [phone].    ATENCIÓN: Si habla español, tiene a urena disposición servicios gratuitos de asistencia lingüística. [facility] [phone].    We comply with applicable federal civil rights laws and [place] laws. We do not discriminate on the basis of race, color, national origin, age, disability, sex, sexual orientation, or gender identity.            Thank you!     Thank you for choosing [facility]  for your care. Our goal is always to provide you with excellent care. Hearing back from our patients is one way we can continue to improve our services. Please take a few minutes to complete the written survey that you may receive in the mail after your visit with us. Thank you!             Your Updated Medication List - Protect others around you: Learn how to safely use, store and throw away your medicines at www.disposemymeds.org.          This list is accurate as of 4/27/18 12:05 PM.  Always use your " most recent med list.                   Brand Name Dispense Instructions for use Diagnosis    acyclovir 400 MG tablet    ZOVIRAX    60 tablet    TAKE 1 TABLET (400 MG) BY ORAL ROUTE 2 TIMES PER DAY    Herpes simplex virus infection       * atorvastatin 40 MG tablet    LIPITOR    10 tablet    TAKE 1 TABLET (40 MG) BY MOUTH TWICE A WEEK    Hyperlipidemia LDL goal <130       * atorvastatin 40 MG tablet    LIPITOR    10 tablet    TAKE ONE TABLET BY MOUTH TWICE WEEKLY    Hyperlipidemia LDL goal <130       CALCIUM-VITAMIN D PO      Take  by mouth daily.        gabapentin 100 MG capsule    NEURONTIN    270 capsule    TAKE ONE CAPSULE BY MOUTH THREE TIMES DAILY    Migraine without aura and without status migrainosus, not intractable       hydrochlorothiazide 25 MG tablet    HYDRODIURIL    90 tablet    TAKE 1 TABLET BY MOUTH ONE TIME DAILY    Hypertension goal BP (blood pressure) < 140/90       MAGNESIUM PO      Take by mouth daily        OMEGA 3 PO      Reported on 5/19/2017        omeprazole 20 MG CR capsule    priLOSEC    30 capsule    TAKE 1 CAPSULE (20 MG) BY ORAL ROUTE ONCE DAILY BEFORE A MEAL    GERD (gastroesophageal reflux disease)       PROBIOTIC DAILY PO      Take by mouth daily        SUMAtriptan 50 MG tablet    IMITREX    18 tablet    take 1 tablet at onset of headache may repeat in 2 hours as needed max 2 tabs/day    Migraine without aura and without status migrainosus, not intractable       triamcinolone 0.1 % cream    KENALOG    15 g    Apply topically 2 times daily as needed for irritation Apply sparingly to affected area three times daily for 14 days.    Contact dermatitis, unspecified contact dermatitis type, unspecified trigger       TURMERIC COMPLEX/BLACK PEPPER PO      Take by mouth daily Cameron paste        * Notice:  This list has 2 medication(s) that are the same as other medications prescribed for you. Read the directions carefully, and ask your doctor or other care provider to review them with you.

## 2018-04-27 NOTE — LETTER
4/27/2018         RE: Nicole Slaughter  [address] [place] [address]        Dear Colleague,    Thank you for referring your patient, Nicole Slaughter, to the [facility]. Please see a copy of my visit note below.                                                     - Endocrinology Follow up -    Reason for visit/consult: osteopenia, thyroid nodule    Primary care provider: Lizett Parikh    HPI: A 74 yo male with follow up right thyroid Nodule 2 cm solid, first FNA was AUS in 2017, then repeated one was benign on 4/7/2017.  Here for the annual follow-up.  Patient underwent thyroid ultrasound April 2018 , which shows no change in size of her right nodule.     Last time we also discussed bone density result which was osteopenia with radius T score -2.1. Patient received Reclast once in 7/2017.  Also she has been taking Calcium supplement daily basis (Ca 1260 mg, Vit D 1000 IU)  with excellent compliance.  No joint pain no hip pain no history of fractures.     Today's her main concern is hair loss and weight gain.  She started to notice hair loss especially in her eyebrow for the past around 1 year.  Also she gained 11 pounds since last year.  Her energy level is slightly low, requiring nap daytime half an hour.  Her sleeping pattern became irregular, she could not sleep well and frequently wakes up.  She mentioned she has been taking Biotene supplement for the past few years to tablet with unknown dose.  She is also taking MSM (methylsulfonylmethane) for her hair.     Energy level: slight lower side, requires nap during the day  Dry skin: no  Hair loss:slightly yes, eye brow losing  Weight changes:  BM:no  Concentration: decerasing  Forgetfulness:  Family history of thyroid disease: maternal cousin: thyroid disease,       Past Medical/Surgical History:  Past Medical History:   Diagnosis Date     Cervicalgia      Depressive disorder      Generalized osteoarthrosis, unspecified site       Migraine, unspecified, without mention of intractable migraine without mention of status migrainosus      Thyroid nodule 12/1/2016     Unspecified essential hypertension      Past Surgical History:   Procedure Laterality Date     HYSTERECTOMY, TANG       ORTHOPEDIC SURGERY       SOFT TISSUE SURGERY         Allergies:  Allergies   Allergen Reactions     Tylenol W/Codeine [Acetaminophen-Codeine] Itching     codeine     Percocet [Oxycodone-Acetaminophen] Rash     Sulfa Drugs Rash       Current Medications   Current Outpatient Prescriptions   Medication     acyclovir (ZOVIRAX) 400 MG tablet     atorvastatin (LIPITOR) 40 MG tablet     atorvastatin (LIPITOR) 40 MG tablet     Black Pepper-Turmeric (TURMERIC COMPLEX/BLACK PEPPER PO)     CALCIUM-VITAMIN D PO     gabapentin (NEURONTIN) 100 MG capsule     hydrochlorothiazide (HYDRODIURIL) 25 MG tablet     MAGNESIUM PO     Omega-3 Fatty Acids (OMEGA 3 PO)     omeprazole (PRILOSEC) 20 MG CR capsule     Probiotic Product (PROBIOTIC DAILY PO)     SUMAtriptan (IMITREX) 50 MG tablet     triamcinolone (KENALOG) 0.1 % cream     No current facility-administered medications for this visit.        Family History:  Family History   Problem Relation Age of Onset     Arthritis Mother      Lipids Mother      Hypertension Mother      Breast Cancer Mother      Anesthesia Reaction Mother      HEART DISEASE Father      CEREBROVASCULAR DISEASE Father      Arthritis Maternal Grandmother      Breast Cancer Maternal Aunt      Breast Cancer Maternal Aunt      DIABETES No family hx of      Coronary Artery Disease No family hx of      Hyperlipidemia No family hx of      Prostate Cancer No family hx of      Other Cancer No family hx of      Anxiety Disorder No family hx of      MENTAL ILLNESS No family hx of      Substance Abuse No family hx of        Social History:  Social History   Substance Use Topics     Smoking status: Never Smoker     Smokeless tobacco: Never Used     Alcohol use Yes      Comment:  "wine       ROS:  Full review of systems taken with the help of the intake sheet. Otherwise a complete 14 point review of systems was taken and is negative unless stated in the history above.    Physical Exam:   Blood pressure 136/80, pulse 99, height 1.6 m (5' 3\"), weight 74.8 kg (164 lb 12.8 oz)  General: well appearing, no acute distress, pleasant and conversant,   Mental Status/neuro: alert and oriented  Face: symmetrical, normal facial color  Eyes: anicteric, PERRL, no proptosis or lid lag  Back: no kyphosis  Neck: suppler, no lymphadenopahty  Thyroid: normal size and texture, no nodule palpable, no bruits  Heart: regular rhythm, S1S2, no murmur appreciated  Lung: clear to auscultation bilaterally  Abdomen: soft, NT/ND, no hepatomegaly  Legs: no swelling or edema        Labs (General):   Lab Results   Component Value Date     05/15/2017      Lab Results   Component Value Date    POTASSIUM 3.9 05/15/2017     Lab Results   Component Value Date    CHLORIDE 102 05/15/2017     Lab Results   Component Value Date    MELLISA 9.0 05/15/2017     Lab Results   Component Value Date    CO2 30 05/15/2017     Lab Results   Component Value Date    BUN 18 05/15/2017     Lab Results   Component Value Date    CR 0.77 05/15/2017     Lab Results   Component Value Date    GLC 99 05/15/2017     Lab Results   Component Value Date    TSH 1.84 02/03/2017     Lab Results   Component Value Date    T4 1.06 02/03/2017 5/1/2017 Bone density     HISTORY: Asymptomatic premature menopause     COMPARISON:   none     Age: 72  years.  Height: 63 inches  Weight: 158 pounds  Sex: Female  Ethnicity: White     Image quality: Adequate     Lumbar spine T-score in region of L1-L4, with the exception of L3 =  1.7      HIPS:  Mean total hip T-score: -0.1     Left femoral neck T-score = -0.9  Right femoral neck T-score= -0.9      Radius 33% T-score = -2.1      IMPRESSION:  Osteopenia    US THYROID, 4/16/2018: I also personally reviewed the original " images, explained the patient and agree below report.     COMPARISON: 4/7/2017, 12/6/2016, 11/25/2016     HISTORY: Right thyroid nodule.     Technique: Grayscale and color ultrasound imaging of the thyroid was  performed.     Findings:    Thyroid parenchyma: Homogeneous on the right, heterogeneous on the  left.  The right lobe of the thyroid measures: 4.1 x 2 x 2.3 cm   The thyroid isthmus measures: 0.3 cm   The left lobe of the thyroid measures: 3.9 x 1.4 x 1.3 cm      Right lobe:  Nodule 1: Mid portion, previously biopsied  Nodule measurement: 2 x 1.9 x 1.6 cm , previously 2 x 1.8 x 1.5 cm  Echogenicity: Isoechoic  Consistency: solid  Calcifications: no  Hypervascular: yes  Interval growth (>20%): no      Impression:  Stable right thyroid nodule.         Assessment and Plan  A 73 year old female with thyroid nodule 2 cm (AUS then benign), and osteopenia,    # thyroid nodule  - repeated sonogram stable, no growth. Explained to the patient, we will repeat next year, and if stable, then we will follow up less frequently.     #Osteopenia   Asymptomatic , no joint pain no hip pain , doing active life .  Post declot to therapy 1 time in July 2017 .  - Continue current exercise/active life  - Continue calcium citrate plus Vitamin D tab (Ca 1260 mg, Vit D 1000 IU)  - Will hold Reclast this year, patient has been doing well.   -Patient will contact insurance company whether her bone density approved this year or not. If OK, we will do bone density.    # hair loss and weight gain  This may attribute to thyroid dysfunction.  We will check TSH and free T4 today.  -TSH, free T4    # Biotin take  I explained the patient Biotene can affect thyroid test. She has been off of Biotene for 2 weeks.      -RTC with me in 1 year     -- Addendum---  Patient is currently euthyroid, no need a thyroid medications.  Notify the patient.   Ref. Range 4/27/2018 12:08   T4 Free Latest Ref Range: 0.76 - 1.46 ng/dL 1.05   TSH Latest Ref Range:  0.40 - 4.00 mU/L 1.78         I spent 35 minutes with this patient face to face and explained the conditions and plans (more than 50% of time was counseling/coordination of care, bone health, long term management of osteopenia) . The patient understood and is satisfied with today's visit. Return to clinic with me in 1 year.         Elizabeth Dey MD  Staff Physician  Endocrinology and Metabolism  License: [id number]    Again, thank you for allowing me to participate in the care of your patient.        Sincerely,        Elizabeth Dey MD

## 2018-04-27 NOTE — PROGRESS NOTES
- Endocrinology Follow up -    Reason for visit/consult: osteopenia, thyroid nodule    Primary care provider: Lizett Parikh    HPI: A 74 yo male with follow up right thyroid Nodule 2 cm solid, first FNA was AUS in 2017, then repeated one was benign on 4/7/2017.  Here for the annual follow-up.  Patient underwent thyroid ultrasound April 2018 , which shows no change in size of her right nodule.     Last time we also discussed bone density result which was osteopenia with radius T score -2.1. Patient received Reclast once in 7/2017.  Also she has been taking Calcium supplement daily basis (Ca 1260 mg, Vit D 1000 IU)  with excellent compliance.  No joint pain no hip pain no history of fractures.     Today's her main concern is hair loss and weight gain.  She started to notice hair loss especially in her eyebrow for the past around 1 year.  Also she gained 11 pounds since last year.  Her energy level is slightly low, requiring nap daytime half an hour.  Her sleeping pattern became irregular, she could not sleep well and frequently wakes up.  She mentioned she has been taking Biotene supplement for the past few years to tablet with unknown dose.  She is also taking MSM (methylsulfonylmethane) for her hair.     Energy level: slight lower side, requires nap during the day  Dry skin: no  Hair loss:slightly yes, eye brow losing  Weight changes:  BM:no  Concentration: decerasing  Forgetfulness:  Family history of thyroid disease: maternal cousin: thyroid disease,       Past Medical/Surgical History:  Past Medical History:   Diagnosis Date     Cervicalgia      Depressive disorder      Generalized osteoarthrosis, unspecified site      Migraine, unspecified, without mention of intractable migraine without mention of status migrainosus      Thyroid nodule 12/1/2016     Unspecified essential hypertension      Past Surgical History:   Procedure Laterality Date     HYSTERECTOMY,  TANG       ORTHOPEDIC SURGERY       SOFT TISSUE SURGERY         Allergies:  Allergies   Allergen Reactions     Tylenol W/Codeine [Acetaminophen-Codeine] Itching     codeine     Percocet [Oxycodone-Acetaminophen] Rash     Sulfa Drugs Rash       Current Medications   Current Outpatient Prescriptions   Medication     acyclovir (ZOVIRAX) 400 MG tablet     atorvastatin (LIPITOR) 40 MG tablet     atorvastatin (LIPITOR) 40 MG tablet     Black Pepper-Turmeric (TURMERIC COMPLEX/BLACK PEPPER PO)     CALCIUM-VITAMIN D PO     gabapentin (NEURONTIN) 100 MG capsule     hydrochlorothiazide (HYDRODIURIL) 25 MG tablet     MAGNESIUM PO     Omega-3 Fatty Acids (OMEGA 3 PO)     omeprazole (PRILOSEC) 20 MG CR capsule     Probiotic Product (PROBIOTIC DAILY PO)     SUMAtriptan (IMITREX) 50 MG tablet     triamcinolone (KENALOG) 0.1 % cream     No current facility-administered medications for this visit.        Family History:  Family History   Problem Relation Age of Onset     Arthritis Mother      Lipids Mother      Hypertension Mother      Breast Cancer Mother      Anesthesia Reaction Mother      HEART DISEASE Father      CEREBROVASCULAR DISEASE Father      Arthritis Maternal Grandmother      Breast Cancer Maternal Aunt      Breast Cancer Maternal Aunt      DIABETES No family hx of      Coronary Artery Disease No family hx of      Hyperlipidemia No family hx of      Prostate Cancer No family hx of      Other Cancer No family hx of      Anxiety Disorder No family hx of      MENTAL ILLNESS No family hx of      Substance Abuse No family hx of        Social History:  Social History   Substance Use Topics     Smoking status: Never Smoker     Smokeless tobacco: Never Used     Alcohol use Yes      Comment: wine       ROS:  Full review of systems taken with the help of the intake sheet. Otherwise a complete 14 point review of systems was taken and is negative unless stated in the history above.    Physical Exam:   Blood pressure 136/80, pulse 99,  "height 1.6 m (5' 3\"), weight 74.8 kg (164 lb 12.8 oz)  General: well appearing, no acute distress, pleasant and conversant,   Mental Status/neuro: alert and oriented  Face: symmetrical, normal facial color  Eyes: anicteric, PERRL, no proptosis or lid lag  Back: no kyphosis  Neck: suppler, no lymphadenopahty  Thyroid: normal size and texture, no nodule palpable, no bruits  Heart: regular rhythm, S1S2, no murmur appreciated  Lung: clear to auscultation bilaterally  Abdomen: soft, NT/ND, no hepatomegaly  Legs: no swelling or edema        Labs (General):   Lab Results   Component Value Date     05/15/2017      Lab Results   Component Value Date    POTASSIUM 3.9 05/15/2017     Lab Results   Component Value Date    CHLORIDE 102 05/15/2017     Lab Results   Component Value Date    MELLISA 9.0 05/15/2017     Lab Results   Component Value Date    CO2 30 05/15/2017     Lab Results   Component Value Date    BUN 18 05/15/2017     Lab Results   Component Value Date    CR 0.77 05/15/2017     Lab Results   Component Value Date    GLC 99 05/15/2017     Lab Results   Component Value Date    TSH 1.84 02/03/2017     Lab Results   Component Value Date    T4 1.06 02/03/2017 5/1/2017 Bone density     HISTORY: Asymptomatic premature menopause     COMPARISON:   none     Age: 72  years.  Height: 63 inches  Weight: 158 pounds  Sex: Female  Ethnicity: White     Image quality: Adequate     Lumbar spine T-score in region of L1-L4, with the exception of L3 =  1.7      HIPS:  Mean total hip T-score: -0.1     Left femoral neck T-score = -0.9  Right femoral neck T-score= -0.9      Radius 33% T-score = -2.1      IMPRESSION:  Osteopenia    US THYROID, 4/16/2018: I also personally reviewed the original images, explained the patient and agree below report.     COMPARISON: 4/7/2017, 12/6/2016, 11/25/2016     HISTORY: Right thyroid nodule.     Technique: Grayscale and color ultrasound imaging of the thyroid was  performed.     Findings:    Thyroid " parenchyma: Homogeneous on the right, heterogeneous on the  left.  The right lobe of the thyroid measures: 4.1 x 2 x 2.3 cm   The thyroid isthmus measures: 0.3 cm   The left lobe of the thyroid measures: 3.9 x 1.4 x 1.3 cm      Right lobe:  Nodule 1: Mid portion, previously biopsied  Nodule measurement: 2 x 1.9 x 1.6 cm , previously 2 x 1.8 x 1.5 cm  Echogenicity: Isoechoic  Consistency: solid  Calcifications: no  Hypervascular: yes  Interval growth (>20%): no      Impression:  Stable right thyroid nodule.         Assessment and Plan  A 73 year old female with thyroid nodule 2 cm (AUS then benign), and osteopenia,    # thyroid nodule  - repeated sonogram stable, no growth. Explained to the patient, we will repeat next year, and if stable, then we will follow up less frequently.     #Osteopenia   Asymptomatic , no joint pain no hip pain , doing active life .  Post declot to therapy 1 time in July 2017 .  - Continue current exercise/active life  - Continue calcium citrate plus Vitamin D tab (Ca 1260 mg, Vit D 1000 IU)  - Will hold Reclast this year, patient has been doing well.   -Patient will contact insurance company whether her bone density approved this year or not. If OK, we will do bone density.    # hair loss and weight gain  This may attribute to thyroid dysfunction.  We will check TSH and free T4 today.  -TSH, free T4    # Biotin take  I explained the patient Biotene can affect thyroid test. She has been off of Biotene for 2 weeks.      -RTC with me in 1 year     -- Addendum---  Patient is currently euthyroid, no need a thyroid medications.  Notify the patient.   Ref. Range 4/27/2018 12:08   T4 Free Latest Ref Range: 0.76 - 1.46 ng/dL 1.05   TSH Latest Ref Range: 0.40 - 4.00 mU/L 1.78         I spent 35 minutes with this patient face to face and explained the conditions and plans (more than 50% of time was counseling/coordination of care, bone health, long term management of osteopenia) . The patient  understood and is satisfied with today's visit. Return to clinic with me in 1 year.         Elizabeth Dey MD  Staff Physician  Endocrinology and Metabolism  License: [id number]

## 2018-05-21 DIAGNOSIS — E78.5 HYPERLIPIDEMIA LDL GOAL <130: ICD-10-CM

## 2018-05-22 NOTE — TELEPHONE ENCOUNTER
"Requested Prescriptions   Pending Prescriptions Disp Refills     atorvastatin (LIPITOR) 40 MG tablet [Pharmacy Med Name: Atorvastatin Calcium Oral Tablet 40 MG] 10 tablet 0     Sig: TAKE ONE TABLET BY MOUTH TWICE WEEKLY    Statins Protocol Failed    5/21/2018 12:29 PM       Failed - LDL on file in past 12 months    Recent Labs   Lab Test  05/15/17   0851   LDL  129*            Passed - No abnormal creatine kinase in past 12 months    No lab results found.            Passed - Recent (12 mo) or future (30 days) visit within the authorizing provider's specialty    Patient had office visit in the last 12 months or has a visit in the next 30 days with authorizing provider or within the authorizing provider's specialty.  See \"Patient Info\" tab in inbasket, or \"Choose Columns\" in Meds & Orders section of the refill encounter.           Passed - Patient is age 18 or older       Passed - No active pregnancy on record       Passed - No positive pregnancy test in past 12 months        Routing refill request to provider for review/approval because:  Umm given x1 and patient did not follow up, please advise    Viridiana Hoskins RN          "

## 2018-05-24 RX ORDER — ATORVASTATIN CALCIUM 40 MG/1
TABLET, FILM COATED ORAL
Qty: 10 TABLET | Refills: 0 | Status: SHIPPED | OUTPATIENT
Start: 2018-05-24 | End: 2018-06-29

## 2018-06-23 DIAGNOSIS — I10 HYPERTENSION GOAL BP (BLOOD PRESSURE) < 140/90: ICD-10-CM

## 2018-06-25 RX ORDER — HYDROCHLOROTHIAZIDE 25 MG/1
TABLET ORAL
Qty: 30 TABLET | Refills: 0 | Status: SHIPPED | OUTPATIENT
Start: 2018-06-25 | End: 2018-07-23

## 2018-06-25 NOTE — TELEPHONE ENCOUNTER
Medication is being filled for 1 time refill only due to:  Patient needs to be seen because due for ov..   Next 5 appointments (look out 90 days)     Jul 03, 2018 11:20 AM CDT   Office Visit with Lizett Parikh MD   [facility] ([facility])    [address], [address]  [facility] [address]   [phone]                Nolberto Rand RN, BSN

## 2018-06-27 ENCOUNTER — RADIANT APPOINTMENT (OUTPATIENT)
Dept: GENERAL RADIOLOGY | Facility: CLINIC | Age: 73
End: 2018-06-27
Attending: FAMILY MEDICINE
Payer: COMMERCIAL

## 2018-06-27 ENCOUNTER — OFFICE VISIT (OUTPATIENT)
Dept: FAMILY MEDICINE | Facility: CLINIC | Age: 73
End: 2018-06-27
Payer: COMMERCIAL

## 2018-06-27 VITALS
OXYGEN SATURATION: 97 % | TEMPERATURE: 98.9 F | HEART RATE: 86 BPM | RESPIRATION RATE: 18 BRPM | DIASTOLIC BLOOD PRESSURE: 64 MMHG | SYSTOLIC BLOOD PRESSURE: 142 MMHG

## 2018-06-27 DIAGNOSIS — M25.561 ACUTE PAIN OF RIGHT KNEE: Primary | ICD-10-CM

## 2018-06-27 DIAGNOSIS — M25.561 ACUTE PAIN OF RIGHT KNEE: ICD-10-CM

## 2018-06-27 DIAGNOSIS — F32.5 DEPRESSION, MAJOR, IN REMISSION (H): ICD-10-CM

## 2018-06-27 PROCEDURE — 73562 X-RAY EXAM OF KNEE 3: CPT | Mod: RT

## 2018-06-27 PROCEDURE — 99214 OFFICE O/P EST MOD 30 MIN: CPT | Performed by: FAMILY MEDICINE

## 2018-06-27 RX ORDER — FLUOXETINE 10 MG/1
10 CAPSULE ORAL DAILY
Qty: 30 CAPSULE | Refills: 1 | Status: SHIPPED | OUTPATIENT
Start: 2018-06-27 | End: 2018-07-31

## 2018-06-27 RX ORDER — HYDROCODONE BITARTRATE AND ACETAMINOPHEN 5; 325 MG/1; MG/1
1 TABLET ORAL EVERY 6 HOURS PRN
Qty: 10 TABLET | Refills: 0 | Status: SHIPPED | OUTPATIENT
Start: 2018-06-27 | End: 2019-08-13

## 2018-06-27 ASSESSMENT — ANXIETY QUESTIONNAIRES
IF YOU CHECKED OFF ANY PROBLEMS ON THIS QUESTIONNAIRE, HOW DIFFICULT HAVE THESE PROBLEMS MADE IT FOR YOU TO DO YOUR WORK, TAKE CARE OF THINGS AT HOME, OR GET ALONG WITH OTHER PEOPLE: SOMEWHAT DIFFICULT
6. BECOMING EASILY ANNOYED OR IRRITABLE: NEARLY EVERY DAY
5. BEING SO RESTLESS THAT IT IS HARD TO SIT STILL: NOT AT ALL
7. FEELING AFRAID AS IF SOMETHING AWFUL MIGHT HAPPEN: NOT AT ALL
3. WORRYING TOO MUCH ABOUT DIFFERENT THINGS: NOT AT ALL
2. NOT BEING ABLE TO STOP OR CONTROL WORRYING: NOT AT ALL
GAD7 TOTAL SCORE: 5
1. FEELING NERVOUS, ANXIOUS, OR ON EDGE: MORE THAN HALF THE DAYS

## 2018-06-27 ASSESSMENT — PAIN SCALES - GENERAL: PAINLEVEL: EXTREME PAIN (8)

## 2018-06-27 ASSESSMENT — PATIENT HEALTH QUESTIONNAIRE - PHQ9: 5. POOR APPETITE OR OVEREATING: NOT AT ALL

## 2018-06-27 NOTE — MR AVS SNAPSHOT
After Visit Summary   6/27/2018    Nicole Slaughter    MRN: [id number]           Patient Information     Date Of Birth          1945        Visit Information        Provider Department      6/27/2018 10:40 AM Lizett Parikh MD [facility]        Today's Diagnoses     Acute pain of right knee    -  1    Depression, major, in remission (H)           Follow-ups after your visit        Follow-up notes from your care team     Return in about 4 weeks (around 7/25/2018) for Recheck.      Your next 10 appointments already scheduled     Jun 28, 2018 10:45 AM CDT   New Visit with Cristobal Ortez MD   [facility] ([facility])    [address]  [facility] [id number] [phone]            Apr 26, 2019 11:30 AM CDT   Return Visit with Elizabeth Dey MD   [facility] ([facility]    [address] [place] [id number] [phone]              Who to contact     If you have questions or need follow up information about today's clinic visit or your schedule please contact [facility] directly at [phone].  Normal or non-critical lab and imaging results will be communicated to you by MyChart, letter or phone within 4 business days after the clinic has received the results. If you do not hear from us within 7 days, please contact the clinic through Joongelhart or phone. If you have a critical or abnormal lab result, we will notify you by phone as soon as possible.  Submit refill requests through Qubole or call your pharmacy and they will forward the refill request to us. Please allow 3 business days for your refill to be completed.          Additional Information About Your Visit        Joongelhart Information     Qubole gives you secure access to your electronic health record. If you see a primary care provider, you can also send messages to your care team and make appointments. If you  have questions, please call your primary care clinic.  If you do not have a primary care provider, please call [phone] and they will assist you.        Care EveryWhere ID     This is your Care EveryWhere ID. This could be used by other organizations to access your [facility] medical records  [id number]        Your Vitals Were     Pulse Temperature Respirations Pulse Oximetry          86 98.9  F (37.2  C) (Temporal) 18 97%         Blood Pressure from Last 3 Encounters:   06/27/18 142/64   04/27/18 136/80   10/04/17 124/72    Weight from Last 3 Encounters:   04/27/18 164 lb 12.8 oz (74.8 kg)   10/04/17 161 lb 12.8 oz (73.4 kg)   07/12/17 159 lb 14.4 oz (72.5 kg)              We Performed the Following     DEPRESSION ACTION PLAN (DAP)          Today's Medication Changes          These changes are accurate as of 6/27/18 11:43 AM.  If you have any questions, ask your nurse or doctor.               Start taking these medicines.        Dose/Directions    FLUoxetine 10 MG capsule   Commonly known as:  PROzac   Used for:  Depression, major, in remission (H)   Started by:  Lizett Parikh MD        Dose:  10 mg   Take 1 capsule (10 mg) by mouth daily   Quantity:  30 capsule   Refills:  1       HYDROcodone-acetaminophen 5-325 MG per tablet   Commonly known as:  NORCO   Used for:  Acute pain of right knee   Started by:  Lizett Parikh MD        Dose:  1 tablet   Take 1 tablet by mouth every 6 hours as needed for severe pain   Quantity:  10 tablet   Refills:  0            Where to get your medicines      These medications were sent to [id number] - [facility] [place] - [address]  [address] [place] [address] [place] [address]     Phone:  [phone]     FLUoxetine 10 MG capsule         Some of these will need a paper prescription and others can be bought over the counter.  Ask your nurse if you have questions.     Bring a paper prescription for each of these medications     HYDROcodone-acetaminophen 5-325 MG per tablet                Information about OPIOIDS     PRESCRIPTION OPIOIDS: WHAT YOU NEED TO KNOW   We gave you an opioid (narcotic) pain medicine. It is important to manage your pain, but opioids are not always the best choice. You should first try all the other options your care team gave you. Take this medicine for as short a time (and as few doses) as possible.     These medicines have risks:    DO NOT drive when on new or higher doses of pain medicine. These medicines can affect your alertness and reaction times, and you could be arrested for driving under the influence (DUI). If you need to use opioids long-term, talk to your care team about driving.    DO NOT operate heave machinery    DO NOT do any other dangerous activities while taking these medicines.     DO NOT drink any alcohol while taking these medicines.      If the opioid prescribed includes acetaminophen, DO NOT take with any other medicines that contain acetaminophen. Read all labels carefully. Look for the word  acetaminophen  or  Tylenol.  Ask your pharmacist if you have questions or are unsure.    You can get addicted to pain medicines, especially if you have a history of addiction (chemical, alcohol or substance dependence). Talk to your care team about ways to reduce this risk.    Store your pills in a secure place, locked if possible. We will not replace any lost or stolen medicine. If you don t finish your medicine, please throw away (dispose) as directed by your pharmacist. The [place] Pollution Control Agency has more information about safe disposal: https://www.pca.[facility].[place].us/living-green/managing-unwanted-medications.     All opioids tend to cause constipation. Drink plenty of water and eat foods that have a lot of fiber, such as fruits, vegetables, prune juice, apple juice and high-fiber cereal. Take a laxative (Miralax, milk of magnesia, Colace, Senna) if you don t move your bowels at least every other day.          Primary Care Provider Office  Phone # Fax #    Lizett ECKERT MD Jl [phone] [address] [facility] [address]        Equal Access to Services     THA MEYERS : Hadii aad ku hadmayelino Somylaali, waaxda luqadaha, qaybta kaalmada adeshivanida, morena mcintosh faithtwan shi laShaileshravin cazares. So [facility] [phone].    ATENCIÓN: Si habla español, tiene a urena disposición servicios gratuitos de asistencia lingüística. Llame al [phone].    We comply with applicable federal civil rights laws and [place] laws. We do not discriminate on the basis of race, color, national origin, age, disability, sex, sexual orientation, or gender identity.            Thank you!     Thank you for choosing [facility]  for your care. Our goal is always to provide you with excellent care. Hearing back from our patients is one way we can continue to improve our services. Please take a few minutes to complete the written survey that you may receive in the mail after your visit with us. Thank you!             Your Updated Medication List - Protect others around you: Learn how to safely use, store and throw away your medicines at www.disposemymeds.org.          This list is accurate as of 6/27/18 11:43 AM.  Always use your most recent med list.                   Brand Name Dispense Instructions for use Diagnosis    acyclovir 400 MG tablet    ZOVIRAX    60 tablet    TAKE 1 TABLET (400 MG) BY ORAL ROUTE 2 TIMES PER DAY    Herpes simplex virus infection       * atorvastatin 40 MG tablet    LIPITOR    10 tablet    TAKE 1 TABLET (40 MG) BY MOUTH TWICE A WEEK    Hyperlipidemia LDL goal <130       * atorvastatin 40 MG tablet    LIPITOR    10 tablet    TAKE ONE TABLET BY MOUTH TWICE WEEKLY    Hyperlipidemia LDL goal <130       CALCIUM-VITAMIN D PO      Take  by mouth daily.        FLUoxetine 10 MG capsule    PROzac    30 capsule    Take 1 capsule (10 mg) by mouth daily    Depression, major, in remission (H)       gabapentin 100 MG capsule    NEURONTIN     270 capsule    TAKE ONE CAPSULE BY MOUTH THREE TIMES DAILY    Migraine without aura and without status migrainosus, not intractable       hydrochlorothiazide 25 MG tablet    HYDRODIURIL    30 tablet    TAKE ONE TABLET BY MOUTH ONE TIME DAILY    Hypertension goal BP (blood pressure) < 140/90       HYDROcodone-acetaminophen 5-325 MG per tablet    NORCO    10 tablet    Take 1 tablet by mouth every 6 hours as needed for severe pain    Acute pain of right knee       MAGNESIUM PO      Take by mouth daily        OMEGA 3 PO      Reported on 5/19/2017        omeprazole 20 MG CR capsule    priLOSEC    30 capsule    TAKE 1 CAPSULE (20 MG) BY ORAL ROUTE ONCE DAILY BEFORE A MEAL    GERD (gastroesophageal reflux disease)       PROBIOTIC DAILY PO      Take by mouth daily        SUMAtriptan 50 MG tablet    IMITREX    18 tablet    take 1 tablet at onset of headache may repeat in 2 hours as needed max 2 tabs/day    Migraine without aura and without status migrainosus, not intractable       triamcinolone 0.1 % cream    KENALOG    15 g    Apply topically 2 times daily as needed for irritation Apply sparingly to affected area three times daily for 14 days.    Contact dermatitis, unspecified contact dermatitis type, unspecified trigger       TURMERIC COMPLEX/BLACK PEPPER PO      Take by mouth daily Cameron paste        * Notice:  This list has 2 medication(s) that are the same as other medications prescribed for you. Read the directions carefully, and ask your doctor or other care provider to review them with you.

## 2018-06-27 NOTE — PROGRESS NOTES
SUBJECTIVE:   Nicole Slaughter is a 73 year old female who presents to clinic today for the following health issues:      HPI  Joint Pain    Onset: Couple months ago, past week has been getting worse    Description:   Location: right knee  Character: Sharp, Dull ache and Stabbing    Intensity: severe    Progression of Symptoms: worse    Accompanying Signs & Symptoms:  Other symptoms: numbness, tingling, weakness of knee and swelling    History:   Previous similar pain: no       Precipitating factors:   Trauma or overuse: no     Alleviating factors:  Improved by: None    Therapies Tried and outcome: Aleve, does not help/     Right knee pain started one month ago. She does not recall any prior injury. She did some shoveling a few months ago but doesn't believe she injured her knee at that time. Since then, patient hasn't been able to apply any pressure, it is worsening, not able to straighten her knee or lift it, and is needing to use crutches. This morning, patient was in the shower and states that she twisted it. There is some swelling and she has been ice packing it. She has tried Aleve and states of no improvement.     Depression  Of note, patient has been more irritable and most irritable towards her spouse more. She feels like she hasn't been herself and attributes it all to her knee pain. She is interested in restarting depression medications. See PHQ9 and GAD7    Problem list and histories reviewed & adjusted, as indicated.  Additional history: as documented  Patient Active Problem List   Diagnosis     Generalized osteoarthrosis, unspecified site     Arthropathy     Hypertension goal BP (blood pressure) < 140/90     Migraine headache     Hyperlipidemia LDL goal <130     Knee pain     Back pain     Neck pain     Arthritis of hand, degenerative     GERD (gastroesophageal reflux disease)     Health Care Home     Advanced directives, counseling/discussion     Depression, major, in remission (H)     Thyroid  nodule     Osteopenia     Early menopause     Past Surgical History:   Procedure Laterality Date     HYSTERECTOMY, TANG       ORTHOPEDIC SURGERY       SOFT TISSUE SURGERY         Social History   Substance Use Topics     Smoking status: Never Smoker     Smokeless tobacco: Never Used     Alcohol use Yes      Comment: wine     Family History   Problem Relation Age of Onset     Arthritis Mother      Lipids Mother      Hypertension Mother      Breast Cancer Mother      Anesthesia Reaction Mother      HEART DISEASE Father      Cerebrovascular Disease Father      Arthritis Maternal Grandmother      Breast Cancer Maternal Aunt      Breast Cancer Maternal Aunt      Diabetes No family hx of      Coronary Artery Disease No family hx of      Hyperlipidemia No family hx of      Prostate Cancer No family hx of      Other Cancer No family hx of      Anxiety Disorder No family hx of      Mental Illness No family hx of      Substance Abuse No family hx of          Current Outpatient Prescriptions   Medication Sig Dispense Refill     acyclovir (ZOVIRAX) 400 MG tablet TAKE 1 TABLET (400 MG) BY ORAL ROUTE 2 TIMES PER DAY 60 tablet 4     atorvastatin (LIPITOR) 40 MG tablet TAKE ONE TABLET BY MOUTH TWICE WEEKLY 10 tablet 0     atorvastatin (LIPITOR) 40 MG tablet TAKE 1 TABLET (40 MG) BY MOUTH TWICE A WEEK 10 tablet 6     Black Pepper-Turmeric (TURMERIC COMPLEX/BLACK PEPPER PO) Take by mouth daily Cameron paste       CALCIUM-VITAMIN D PO Take  by mouth daily.       gabapentin (NEURONTIN) 100 MG capsule TAKE ONE CAPSULE BY MOUTH THREE TIMES DAILY  270 capsule 0     hydrochlorothiazide (HYDRODIURIL) 25 MG tablet TAKE ONE TABLET BY MOUTH ONE TIME DAILY  30 tablet 0     MAGNESIUM PO Take by mouth daily       omeprazole (PRILOSEC) 20 MG CR capsule TAKE 1 CAPSULE (20 MG) BY ORAL ROUTE ONCE DAILY BEFORE A MEAL 30 capsule 5     Probiotic Product (PROBIOTIC DAILY PO) Take by mouth daily       Omega-3 Fatty Acids (OMEGA 3 PO) Reported on 5/19/2017        SUMAtriptan (IMITREX) 50 MG tablet take 1 tablet at onset of headache may repeat in 2 hours as needed max 2 tabs/day 18 tablet 2     triamcinolone (KENALOG) 0.1 % cream Apply topically 2 times daily as needed for irritation Apply sparingly to affected area three times daily for 14 days. (Patient not taking: Reported on 6/27/2018) 15 g 0     Allergies   Allergen Reactions     Tylenol W/Codeine [Acetaminophen-Codeine] Itching     codeine     Percocet [Oxycodone-Acetaminophen] Rash     Sulfa Drugs Rash     Recent Labs   Lab Test  04/27/18   1208  05/15/17   0851  02/03/17   1132   04/29/16   0923  03/24/15   0852  12/17/14   0928   LDL   --   129*   --    --   113*  109  83   HDL   --   80   --    --   60  67  62   TRIG   --   81   --    --   112  134  97   ALT   --   30   --    --    --   27  23   CR   --   0.77   --    --   0.67   --   0.75   GFRESTIMATED   --   74   --    --   87   --   77   GFRESTBLACK   --   89   --    --   >90   GFR Calc     --   >90   GFR Calc     POTASSIUM   --   3.9   --    --   3.6   --   3.5   TSH  1.78   --   1.84   < >   --    --    --     < > = values in this interval not displayed.      BP Readings from Last 3 Encounters:   06/27/18 142/64   04/27/18 136/80   10/04/17 124/72    Wt Readings from Last 3 Encounters:   04/27/18 164 lb 12.8 oz (74.8 kg)   10/04/17 161 lb 12.8 oz (73.4 kg)   07/12/17 159 lb 14.4 oz (72.5 kg)            ROS:  CONSTITUTIONAL: NEGATIVE for fever, chills, change in weight  MUSCULOSKELETAL: See HPI above.  PSYCHIATRIC: See HPI above.  ROS otherwise negative    This document serves as a record of the services and decisions personally performed and made by Lizett Parikh MD. It was created on her behalf by Renée Orozco, a trained medical scribe. The creation of this document is based the provider's statements to the medical scribe.    Renée Orozco June 27, 2018 11:03 AM    OBJECTIVE:   /64  Pulse 86  Temp 98.9  F (37.2  C)  (Temporal)  Resp 18  SpO2 97%  There is no height or weight on file to calculate BMI.  GENERAL: healthy, alert and no distress  MS: Tender on the whole posterior knee, both bilaterally on the sides, no effusion, no erythema, could flex normally but cannot extend fully due to pain. Unable to assess ability.  SKIN: no suspicious lesions or rashes  NEURO: Normal strength and tone, mentation intact and speech normal  PSYCH: mentation appears normal, affect normal/bright    Diagnostic Test Results:  No results found for this or any previous visit (from the past 24 hour(s)).    ASSESSMENT/PLAN:   1. Acute pain of right knee  XR completed today. No signs of arthritis or fracture on images.   Will await official radiology read and notify patient if results differ.   Attempted to fit with brace but unable to tolerate.   Discussed MRI but declines due to her pain level.   Will be following up with orthopedics tomorrow for further evaluation and to discuss treatment options.   Short Rx for [place] provided, home cares, ice packing, and weight bearing discussed until evaluation tomorrow.     - XR Knee Right 3 Views; Future  - HYDROcodone-acetaminophen (NORCO) 5-325 MG per tablet; Take 1 tablet by mouth every 6 hours as needed for severe pain  Dispense: 10 tablet; Refill: 0    2. Depression, major, in remission (H)  Restarting on Prozac as patient expressed interest restarting. Rx provided today.  Recheck in 4 weeks. Sooner if needed.   - FLUoxetine (PROZAC) 10 MG capsule; Take 1 capsule (10 mg) by mouth daily  Dispense: 30 capsule; Refill: 1      Follow-up with orthopedics tomorrow.    The information in this document, created by the medical scribe for me, accurately reflects the services I personally performed and the decisions made by me. I have reviewed and approved this document for accuracy prior to leaving the patient care area.    JOE GOMEZ MD  [facility]

## 2018-06-27 NOTE — LETTER
My Depression Action Plan  Name: Nicole Slaughter   Date of Birth 1945  Date: 6/27/2018    My doctor: Lizett Parikh   My clinic: [facility]  [address] [place] [address], [address]  [place] [id number] [phone]          GREEN    ZONE   Good Control    What it looks like:     Things are going generally well. You have normal up s and down s. You may even feel depressed from time to time, but bad moods usually last less than a day.   What you need to do:  1. Continue to care for yourself (see self care plan)  2. Check your depression survival kit and update it as needed  3. Follow your physician s recommendations including any medication.  4. Do not stop taking medication unless you consult with your physician first.           YELLOW         ZONE Getting Worse    What it looks like:     Depression is starting to interfere with your life.     It may be hard to get out of bed; you may be starting to isolate yourself from others.    Symptoms of depression are starting to last most all day and this has happened for several days.     You may have suicidal thoughts but they are not constant.   What you need to do:     1. Call your care team, your response to treatment will improve if you keep your care team informed of your progress. Yellow periods are signs an adjustment may need to be made.     2. Continue your self-care, even if you have to fake it!    3. Talk to someone in your support network    4. Open up your depression survival kit           RED    ZONE Medical Alert - Get Help    What it looks like:     Depression is seriously interfering with your life.     You may experience these or other symptoms: You can t get out of bed most days, can t work or engage in other necessary activities, you have trouble taking care of basic hygiene, or basic responsibilities, thoughts of suicide or death that will not go away, self-injurious behavior.     What you need to do:  1. Call your care team  and request a same-day appointment. If they are not available (weekends or after hours) call your local crisis line, emergency room or 911.            Depression Self Care Plan / Survival Kit    Self-Care for Depression  Here s the deal. Your body and mind are really not as separate as most people think.  What you do and think affects how you feel and how you feel influences what you do and think. This means if you do things that people who feel good do, it will help you feel better.  Sometimes this is all it takes.  There is also a place for medication and therapy depending on how severe your depression is, so be sure to consult with your medical provider and/ or Behavioral Health Consultant if your symptoms are worsening or not improving.     In order to better manage my stress, I will:    Exercise  Get some form of exercise, every day. This will help reduce pain and release endorphins, the  feel good  chemicals in your brain. This is almost as good as taking antidepressants!  This is not the same as joining a gym and then never going! (they count on that by the way ) It can be as simple as just going for a walk or doing some gardening, anything that will get you moving.      Hygiene   Maintain good hygiene (Get out of bed in the morning, Make your bed, Brush your teeth, Take a shower, and Get dressed like you were going to work, even if you are unemployed).  If your clothes don't fit try to get ones that do.    Diet  I will strive to eat foods that are good for me, drink plenty of water, and avoid excessive sugar, caffeine, alcohol, and other mood-altering substances.  Some foods that are helpful in depression are: complex carbohydrates, B vitamins, flaxseed, fish or fish oil, fresh fruits and vegetables.    Psychotherapy  I agree to participate in Individual Therapy (if recommended).    Medication  If prescribed medications, I agree to take them.  Missing doses can result in serious side effects.  I understand  that drinking alcohol, or other illicit drug use, may cause potential side effects.  I will not stop my medication abruptly without first discussing it with my provider.    Staying Connected With Others  I will stay in touch with my friends, family members, and my primary care provider/team.    Use your imagination  Be creative.  We all have a creative side; it doesn t matter if it s oil painting, sand castles, or mud pies! This will also kick up the endorphins.    Witness Beauty  (AKA stop and smell the roses) Take a look outside, even in mid-winter. Notice colors, textures. Watch the squirrels and birds.     Service to others  Be of service to others.  There is always someone else in need.  By helping others we can  get out of ourselves  and remember the really important things.  This also provides opportunities for practicing all the other parts of the program.    Humor  Laugh and be silly!  Adjust your TV habits for less news and crime-drama and more comedy.    Control your stress  Try breathing deep, massage therapy, biofeedback, and meditation. Find time to relax each day.     My support system    Clinic Contact:  Phone number:    Contact 1:  Phone number:    Contact 2:  Phone number:    [facility]/:  Phone number:    Therapist:  Phone number:    Local crisis center:    Phone number:    Other community support:  Phone number:

## 2018-06-28 ENCOUNTER — MYC MEDICAL ADVICE (OUTPATIENT)
Dept: FAMILY MEDICINE | Facility: CLINIC | Age: 73
End: 2018-06-28

## 2018-06-28 ENCOUNTER — OFFICE VISIT (OUTPATIENT)
Dept: ORTHOPEDICS | Facility: CLINIC | Age: 73
End: 2018-06-28
Payer: COMMERCIAL

## 2018-06-28 VITALS — DIASTOLIC BLOOD PRESSURE: 82 MMHG | TEMPERATURE: 97.2 F | RESPIRATION RATE: 18 BRPM | SYSTOLIC BLOOD PRESSURE: 144 MMHG

## 2018-06-28 DIAGNOSIS — M17.0 PRIMARY OSTEOARTHRITIS OF BOTH KNEES: Primary | ICD-10-CM

## 2018-06-28 PROCEDURE — 20610 DRAIN/INJ JOINT/BURSA W/O US: CPT | Mod: 50 | Performed by: ORTHOPAEDIC SURGERY

## 2018-06-28 PROCEDURE — 99203 OFFICE O/P NEW LOW 30 MIN: CPT | Mod: 25 | Performed by: ORTHOPAEDIC SURGERY

## 2018-06-28 RX ORDER — METHYLPREDNISOLONE ACETATE 80 MG/ML
160 INJECTION, SUSPENSION INTRA-ARTICULAR; INTRALESIONAL; INTRAMUSCULAR; SOFT TISSUE ONCE
Qty: 2 ML | Refills: 0 | OUTPATIENT
Start: 2018-06-28 | End: 2018-06-28

## 2018-06-28 ASSESSMENT — ANXIETY QUESTIONNAIRES: GAD7 TOTAL SCORE: 5

## 2018-06-28 ASSESSMENT — PATIENT HEALTH QUESTIONNAIRE - PHQ9: SUM OF ALL RESPONSES TO PHQ QUESTIONS 1-9: 7

## 2018-06-28 NOTE — PROGRESS NOTES
Nicole Slaughter is a 73 year old female who is seen in consultation at the request of Dr. Lizett Parikh  for bilateral knee pain.    Past Medical History:   Diagnosis Date     Cervicalgia      Depressive disorder      Generalized osteoarthrosis, unspecified site      Migraine, unspecified, without mention of intractable migraine without mention of status migrainosus      Thyroid nodule 12/1/2016     Unspecified essential hypertension        Past Surgical History:   Procedure Laterality Date     HYSTERECTOMY, TANG       ORTHOPEDIC SURGERY       SOFT TISSUE SURGERY         Family History   Problem Relation Age of Onset     Arthritis Mother      Lipids Mother      Hypertension Mother      Breast Cancer Mother      Anesthesia Reaction Mother      HEART DISEASE Father      Cerebrovascular Disease Father      Arthritis Maternal Grandmother      Breast Cancer Maternal Aunt      Breast Cancer Maternal Aunt      Diabetes No family hx of      Coronary Artery Disease No family hx of      Hyperlipidemia No family hx of      Prostate Cancer No family hx of      Other Cancer No family hx of      Anxiety Disorder No family hx of      Mental Illness No family hx of      Substance Abuse No family hx of        Social History     Social History     Marital status:      Spouse name: N/A     Number of children: N/A     Years of education: N/A     Occupational History     Not on file.     Social History Main Topics     Smoking status: Never Smoker     Smokeless tobacco: Never Used     Alcohol use Yes      Comment: wine     Drug use: No     Sexual activity: Yes     Partners: Male     Birth control/ protection: Surgical, Female Surgical      Comment: not currenlty/hysterectomy     Other Topics Concern     Parent/Sibling W/ Cabg, Mi Or Angioplasty Before 65f 55m? No     Social History Narrative       Current Outpatient Prescriptions   Medication Sig Dispense Refill     acyclovir (ZOVIRAX) 400 MG tablet TAKE 1 TABLET (400 MG) BY ORAL  ROUTE 2 TIMES PER DAY 60 tablet 4     atorvastatin (LIPITOR) 40 MG tablet TAKE ONE TABLET BY MOUTH TWICE WEEKLY 10 tablet 0     atorvastatin (LIPITOR) 40 MG tablet TAKE 1 TABLET (40 MG) BY MOUTH TWICE A WEEK 10 tablet 6     Black Pepper-Turmeric (TURMERIC COMPLEX/BLACK PEPPER PO) Take by mouth daily Cameron paste       CALCIUM-VITAMIN D PO Take  by mouth daily.       FLUoxetine (PROZAC) 10 MG capsule Take 1 capsule (10 mg) by mouth daily 30 capsule 1     gabapentin (NEURONTIN) 100 MG capsule TAKE ONE CAPSULE BY MOUTH THREE TIMES DAILY  270 capsule 0     hydrochlorothiazide (HYDRODIURIL) 25 MG tablet TAKE ONE TABLET BY MOUTH ONE TIME DAILY  30 tablet 0     HYDROcodone-acetaminophen (NORCO) 5-325 MG per tablet Take 1 tablet by mouth every 6 hours as needed for severe pain 10 tablet 0     MAGNESIUM PO Take by mouth daily       Omega-3 Fatty Acids (OMEGA 3 PO) Reported on 5/19/2017       omeprazole (PRILOSEC) 20 MG CR capsule TAKE 1 CAPSULE (20 MG) BY ORAL ROUTE ONCE DAILY BEFORE A MEAL 30 capsule 5     Probiotic Product (PROBIOTIC DAILY PO) Take by mouth daily       SUMAtriptan (IMITREX) 50 MG tablet take 1 tablet at onset of headache may repeat in 2 hours as needed max 2 tabs/day 18 tablet 2     triamcinolone (KENALOG) 0.1 % cream Apply topically 2 times daily as needed for irritation Apply sparingly to affected area three times daily for 14 days. 15 g 0       Allergies   Allergen Reactions     Tylenol W/Codeine [Acetaminophen-Codeine] Itching     codeine     Percocet [Oxycodone-Acetaminophen] Rash     Sulfa Drugs Rash       REVIEW OF SYSTEMS:  CONSTITUTIONAL:  NEGATIVE for fever, chills, change in weight, not feeling tired  SKIN:  NEGATIVE for worrisome rashes, no skin lumps, no skin ulcers and no non-healing wounds  EYES:  NEGATIVE for vision changes or irritation.  ENT/MOUTH:  NEGATIVE.  No hearing loss, no hoarseness, no difficulty swallowing.  RESP:  NEGATIVE. No cough or shortness of breath.  CV:  NEGATIVE for  chest pain, palpitations or peripheral edema  GI:  NEGATIVE for nausea, abdominal pain, heartburn, or change in bowel habits  :  Negative. No dysuria, no hematuria  MUSCULOSKELETAL:  See HPI above  NEURO:  NEGATIVE . No headaches, no dizziness,  no numbness  ENDOCRINE:  NEGATIVE for temperature intolerance, skin/hair changes  HEME/ALLERGY/IMMUNE:  NEGATIVE for bleeding problems  PSYCHIATRIC:  NEGATIVE. no anxiety, no depression.      Exam:  Vitals: /82  Temp 97.2  F (36.2  C)  Resp 18  BMI= There is no height or weight on file to calculate BMI.  Constitutional:  healthy, alert and no distress  Neuro: Alert and Oriented x 3, Gait normal. Sensation grossly WNL.  HEENT:  Atraumatic, EOMI  Neck:  Neck supple with no tenderness.  Psych: Affect normal   Respiratory: Breathing not labored.  Cardiovascular: normal peripheral pulses  Lymph: no adenopathy  Skin: No rashes,worrisome lesions or skin problems  Spine: straight, no straight leg raising pain.  Hips show full range of motion.  There is no tenderness over the sacro-iliac joints, sciatic notch, or greater trochanters.

## 2018-06-28 NOTE — MR AVS SNAPSHOT
After Visit Summary   6/28/2018    Nicole Slaughter    MRN: [id number]           Patient Information     Date Of Birth          1945        Visit Information        Provider Department      6/28/2018 10:45 AM Cristobal Ortez MD [facility]        Care Instructions    You have had a steroid injection today.  For the first 2 hours there will likely be some numbing in the joint from the lidocaine.  This is a good sign, indicating that the injection is in the right place.  In 2 hours the lidocaine will wear off, and the joint will hurt like you had a shot.  Each day the cortisone makes it feel better.  It reaches peak effect in 2 weeks.  We expect it to last for 3 months.  You may resume regular activity when you feel ready.  If you are diabetic, your glucoses will be quite high for several days.            Follow-ups after your visit        Your next 10 appointments already scheduled     Apr 26, 2019 11:30 AM CDT   Return Visit with Elizabeth Dey MD   [facility] ([facility]    [address] [place] [id number] [phone]              Who to contact     If you have questions or need follow up information about today's clinic visit or your schedule please contact [facility] directly at [phone].  Normal or non-critical lab and imaging results will be communicated to you by MyChart, letter or phone within 4 business days after the clinic has received the results. If you do not hear from us within 7 days, please contact the clinic through [employer] or phone. If you have a critical or abnormal lab result, we will notify you by phone as soon as possible.  Submit refill requests through [employer] or call your pharmacy and they will forward the refill request to us. Please allow 3 business days for your refill to be completed.          Additional Information About Your Visit        [employer] Information     [employer] gives you  secure access to your electronic health record. If you see a primary care provider, you can also send messages to your care team and make appointments. If you have questions, please call your primary care clinic.  If you do not have a primary care provider, please call [phone] and they will assist you.        Care EveryWhere ID     This is your Care EveryWhere ID. This could be used by other organizations to access your [place] medical records  [id number]        Your Vitals Were     Temperature Respirations                97.2  F (36.2  C) 18           Blood Pressure from Last 3 Encounters:   06/28/18 144/82   06/27/18 142/64   04/27/18 136/80    Weight from Last 3 Encounters:   04/27/18 74.8 kg (164 lb 12.8 oz)   10/04/17 73.4 kg (161 lb 12.8 oz)   07/12/17 72.5 kg (159 lb 14.4 oz)              Today, you had the following     No orders found for display       Primary Care Provider Office Phone # Fax #    Lizett Parikh [phone] [address] [facility] [address]        Equal Access to Services     : Hadii aad ku hadasho Soomaali, waaxda luqadaha, qaybta kaalmada adeegyada, morena aviles hayadánn adetwan atkinson . So [facility] [phone].    ATENCIÓN: Si habla español, tiene a urena disposición servicios gratuitos de asistencia lingüística. Llame al [phone].    We comply with applicable federal civil rights laws and [place] laws. We do not discriminate on the basis of race, color, national origin, age, disability, sex, sexual orientation, or gender identity.            Thank you!     Thank you for choosing [facility]  for your care. Our goal is always to provide you with excellent care. Hearing back from our patients is one way we can continue to improve our services. Please take a few minutes to complete the written survey that you may receive in the mail after your visit with us. Thank you!             Your Updated Medication List - Protect  others around you: Learn how to safely use, store and throw away your medicines at www.disposemymeds.org.          This list is accurate as of 6/28/18 11:02 AM.  Always use your most recent med list.                   Brand Name Dispense Instructions for use Diagnosis    acyclovir 400 MG tablet    ZOVIRAX    60 tablet    TAKE 1 TABLET (400 MG) BY ORAL ROUTE 2 TIMES PER DAY    Herpes simplex virus infection       * atorvastatin 40 MG tablet    LIPITOR    10 tablet    TAKE 1 TABLET (40 MG) BY MOUTH TWICE A WEEK    Hyperlipidemia LDL goal <130       * atorvastatin 40 MG tablet    LIPITOR    10 tablet    TAKE ONE TABLET BY MOUTH TWICE WEEKLY    Hyperlipidemia LDL goal <130       CALCIUM-VITAMIN D PO      Take  by mouth daily.        FLUoxetine 10 MG capsule    PROzac    30 capsule    Take 1 capsule (10 mg) by mouth daily    Depression, major, in remission (H)       gabapentin 100 MG capsule    NEURONTIN    270 capsule    TAKE ONE CAPSULE BY MOUTH THREE TIMES DAILY    Migraine without aura and without status migrainosus, not intractable       hydrochlorothiazide 25 MG tablet    HYDRODIURIL    30 tablet    TAKE ONE TABLET BY MOUTH ONE TIME DAILY    Hypertension goal BP (blood pressure) < 140/90       HYDROcodone-acetaminophen 5-325 MG per tablet    NORCO    10 tablet    Take 1 tablet by mouth every 6 hours as needed for severe pain    Acute pain of right knee       MAGNESIUM PO      Take by mouth daily        OMEGA 3 PO      Reported on 5/19/2017        omeprazole 20 MG CR capsule    priLOSEC    30 capsule    TAKE 1 CAPSULE (20 MG) BY ORAL ROUTE ONCE DAILY BEFORE A MEAL    GERD (gastroesophageal reflux disease)       PROBIOTIC DAILY PO      Take by mouth daily        SUMAtriptan 50 MG tablet    IMITREX    18 tablet    take 1 tablet at onset of headache may repeat in 2 hours as needed max 2 tabs/day    Migraine without aura and without status migrainosus, not intractable       triamcinolone 0.1 % cream    KENALOG    15 g     Apply topically 2 times daily as needed for irritation Apply sparingly to affected area three times daily for 14 days.    Contact dermatitis, unspecified contact dermatitis type, unspecified trigger       TURMERIC COMPLEX/BLACK PEPPER PO      Take by mouth daily Cameron paste        * Notice:  This list has 2 medication(s) that are the same as other medications prescribed for you. Read the directions carefully, and ask your doctor or other care provider to review them with you.

## 2018-06-28 NOTE — PROGRESS NOTES
The patient's left and right knees were prepped with betadine solution after verification of allergies. Area approximately 10 cm x 10 cm prepped in a sterile fashion. After injection, betadine removed with soap and water and band-aids applied.    1ml depo-medrol with 1% lidocaine plain injected into each knee (for a total of 2 mL depo-medrol) by Dr. Cristobal Ortez  LOT# [id number]  Exp. 06/2020    Pa Cueva PA-C  Supervising physician: Cristobal Ortez MD  Dept. of Orthopedics  [facility]

## 2018-06-28 NOTE — PROGRESS NOTES
Visit Date:   06/28/2018      HISTORY OF PRESENT ILLNESS:  Ms. Slaughter is a 73-year-old female I was asked to see in consultation at the request of Dr. Lizett Parikh for evaluation of bilateral knee pain.  She has had severe right knee pain for several months at home after shoveling snow in April.  She has had a sharp shooting pain rated 5/10.  She has been favoring that enough to cause troubles with the left one and she has pain now in the left.  She previously has had problems of patellofemoral arthritis on the left knee with MRI scan in 2011 showing tricompartmental arthritis, but greatest in the patellofemoral joint.  She had received previous steroid injections of the left knee.  She has taken Aleve up to 3 tablets per day.  She has not had adequate relief with that.      X-ray of the right knee was performed yesterday showing slight spurring on the patellofemoral joint medially and slight lateral tilt of the patellofemoral joint.  Otherwise, no significant arthritis noted.      PHYSICAL EXAMINATION:  Shows full motion of the left knee from 0-130 degrees.  The right knee has pain at about 115 degrees flexion.  She has negative medial and lateral Fiorella from this degree of flexion on into extension.  She did have some anterior medial pain with medial Fiorella, but nothing posteriorly.  She does have diffuse pain with attempted flexion beyond 115 degrees on the right.  She had no ligamentous laxity of MCL, LCL or cruciates on either knee.  There is no effusion on either knee.  There is no increased warmth, erythema.  She does have pain with manipulation of the patellofemoral joint on both sides.  She is significantly guarding on the right.  Sensation and circulation are intact.      IMPRESSION:  Patellofemoral arthritis bilaterally.        We discussed options of increasing anti-inflammatory use versus steroid injection.  She does not need a joint replacement at this point.  After a thorough discussion of  options, she would like to have steroid injection.  This was performed with 80 mg Depo-Medrol and lidocaine bilaterally through an anterior medial approach.        RTC annie OJEDA MD             D: 2018   T: 2018   MT: KYRA      Name:     SEGUNDO BISWAS   MRN:      [id number]        Account:      [id number]   :      1945           Visit Date:   2018      Document: [id number]

## 2018-06-28 NOTE — LETTER
6/28/2018         RE: Nicole Slaughter  [address]  [place] [address]        Dear Colleague,    Thank you for referring your patient, Nicole Slaughter, to the [facility] (formerly [facility] Please see a copy of my visit note below.    Nicole Slaughter is a 73 year old female who is seen in consultation at the request of Dr. Lizett Parikh  for bilateral knee pain.    Past Medical History:   Diagnosis Date     Cervicalgia      Depressive disorder      Generalized osteoarthrosis, unspecified site      Migraine, unspecified, without mention of intractable migraine without mention of status migrainosus      Thyroid nodule 12/1/2016     Unspecified essential hypertension        Past Surgical History:   Procedure Laterality Date     HYSTERECTOMY, TANG       ORTHOPEDIC SURGERY       SOFT TISSUE SURGERY         Family History   Problem Relation Age of Onset     Arthritis Mother      Lipids Mother      Hypertension Mother      Breast Cancer Mother      Anesthesia Reaction Mother      HEART DISEASE Father      Cerebrovascular Disease Father      Arthritis Maternal Grandmother      Breast Cancer Maternal Aunt      Breast Cancer Maternal Aunt      Diabetes No family hx of      Coronary Artery Disease No family hx of      Hyperlipidemia No family hx of      Prostate Cancer No family hx of      Other Cancer No family hx of      Anxiety Disorder No family hx of      Mental Illness No family hx of      Substance Abuse No family hx of        Social History     Social History     Marital status:      Spouse name: N/A     Number of children: N/A     Years of education: N/A     Occupational History     Not on file.     Social History Main Topics     Smoking status: Never Smoker     Smokeless tobacco: Never Used     Alcohol use Yes      Comment: wine     Drug use: No     Sexual activity: Yes     Partners: Male     Birth control/ protection: Surgical, Female Surgical      Comment: not currenlty/hysterectomy     Other Topics  Concern     Parent/Sibling W/ Cabg, Mi Or Angioplasty Before 65f 55m? No     Social History Narrative       Current Outpatient Prescriptions   Medication Sig Dispense Refill     acyclovir (ZOVIRAX) 400 MG tablet TAKE 1 TABLET (400 MG) BY ORAL ROUTE 2 TIMES PER DAY 60 tablet 4     atorvastatin (LIPITOR) 40 MG tablet TAKE ONE TABLET BY MOUTH TWICE WEEKLY 10 tablet 0     atorvastatin (LIPITOR) 40 MG tablet TAKE 1 TABLET (40 MG) BY MOUTH TWICE A WEEK 10 tablet 6     Black Pepper-Turmeric (TURMERIC COMPLEX/BLACK PEPPER PO) Take by mouth daily Cameron paste       CALCIUM-VITAMIN D PO Take  by mouth daily.       FLUoxetine (PROZAC) 10 MG capsule Take 1 capsule (10 mg) by mouth daily 30 capsule 1     gabapentin (NEURONTIN) 100 MG capsule TAKE ONE CAPSULE BY MOUTH THREE TIMES DAILY  270 capsule 0     hydrochlorothiazide (HYDRODIURIL) 25 MG tablet TAKE ONE TABLET BY MOUTH ONE TIME DAILY  30 tablet 0     HYDROcodone-acetaminophen (NORCO) 5-325 MG per tablet Take 1 tablet by mouth every 6 hours as needed for severe pain 10 tablet 0     MAGNESIUM PO Take by mouth daily       Omega-3 Fatty Acids (OMEGA 3 PO) Reported on 5/19/2017       omeprazole (PRILOSEC) 20 MG CR capsule TAKE 1 CAPSULE (20 MG) BY ORAL ROUTE ONCE DAILY BEFORE A MEAL 30 capsule 5     Probiotic Product (PROBIOTIC DAILY PO) Take by mouth daily       SUMAtriptan (IMITREX) 50 MG tablet take 1 tablet at onset of headache may repeat in 2 hours as needed max 2 tabs/day 18 tablet 2     triamcinolone (KENALOG) 0.1 % cream Apply topically 2 times daily as needed for irritation Apply sparingly to affected area three times daily for 14 days. 15 g 0       Allergies   Allergen Reactions     Tylenol W/Codeine [Acetaminophen-Codeine] Itching     codeine     Percocet [Oxycodone-Acetaminophen] Rash     Sulfa Drugs Rash       REVIEW OF SYSTEMS:  CONSTITUTIONAL:  NEGATIVE for fever, chills, change in weight, not feeling tired  SKIN:  NEGATIVE for worrisome rashes, no skin lumps, no  skin ulcers and no non-healing wounds  EYES:  NEGATIVE for vision changes or irritation.  ENT/MOUTH:  NEGATIVE.  No hearing loss, no hoarseness, no difficulty swallowing.  RESP:  NEGATIVE. No cough or shortness of breath.  CV:  NEGATIVE for chest pain, palpitations or peripheral edema  GI:  NEGATIVE for nausea, abdominal pain, heartburn, or change in bowel habits  :  Negative. No dysuria, no hematuria  MUSCULOSKELETAL:  See HPI above  NEURO:  NEGATIVE . No headaches, no dizziness,  no numbness  ENDOCRINE:  NEGATIVE for temperature intolerance, skin/hair changes  HEME/ALLERGY/IMMUNE:  NEGATIVE for bleeding problems  PSYCHIATRIC:  NEGATIVE. no anxiety, no depression.      Exam:  Vitals: /82  Temp 97.2  F (36.2  C)  Resp 18  BMI= There is no height or weight on file to calculate BMI.  Constitutional:  healthy, alert and no distress  Neuro: Alert and Oriented x 3, Gait normal. Sensation grossly WNL.  HEENT:  Atraumatic, EOMI  Neck:  Neck supple with no tenderness.  Psych: Affect normal   Respiratory: Breathing not labored.  Cardiovascular: normal peripheral pulses  Lymph: no adenopathy  Skin: No rashes,worrisome lesions or skin problems  Spine: straight, no straight leg raising pain.  Hips show full range of motion.  There is no tenderness over the sacro-iliac joints, sciatic notch, or greater trochanters.         The patient's left and right knees were prepped with betadine solution after verification of allergies. Area approximately 10 cm x 10 cm prepped in a sterile fashion. After injection, betadine removed with soap and water and band-aids applied.    1ml depo-medrol with 1% lidocaine plain injected into each knee (for a total of 2 mL depo-medrol) by Dr. Cristobal Ortez  LOT# [id number]  Exp. 06/2020    JAMES HooverC  Supervising physician: Cristobal Ortez MD  Dept. of Orthopedics  [facility]          Again, thank you for allowing me to participate in the care of your patient.         Sincerely,        Cristobal Ortez MD

## 2018-07-23 DIAGNOSIS — K21.9 GASTROESOPHAGEAL REFLUX DISEASE, ESOPHAGITIS PRESENCE NOT SPECIFIED: Primary | ICD-10-CM

## 2018-07-23 DIAGNOSIS — I10 HYPERTENSION GOAL BP (BLOOD PRESSURE) < 140/90: ICD-10-CM

## 2018-07-24 NOTE — TELEPHONE ENCOUNTER
Hydrochlorothiazide:  Routing refill request to provider for review/approval because:  Labs not current:  YA    Prilosec:  Sent 5/5/17 with 6 month supply. Refill not appropriate at this time.      Next 5 appointments (look out 90 days)     Jul 31, 2018 11:20 AM CDT   Office Visit with Lizett Parikh MD   [facility]    [address], [address]  [place] [address]   [phone]                Gay Weir, RN, BSN

## 2018-07-25 RX ORDER — HYDROCHLOROTHIAZIDE 25 MG/1
TABLET ORAL
Qty: 30 TABLET | Refills: 0 | Status: SHIPPED | OUTPATIENT
Start: 2018-07-25 | End: 2018-07-31

## 2018-07-26 NOTE — PROGRESS NOTES
SUBJECTIVE:   Nicole Slaughter is a 73 year old female who presents to clinic today for the following health issues:      Hypertension     Depression & Anxiety Follow-up:     Depression/Anxiety:  Depression & Anxiety    Status since last visit::  Improved    Other associated symptoms of depression and anxiety::  None    Significant life event::  No    Current substance use::  None       Today's PHQ-9         PHQ-9 Total Score:         PHQ-9 Q9 Suicidal ideation:       Thoughts of suicide or self harm:      Self-harm Plan:        Self-harm Action:          Safety concerns for self or others:            Hyperlipidemia:     Low fat/chol diet rating::  Good    Taking Statins::  YES    Side effects from hypolipidemia medication::  Possible muscle aches from Statin (legs and foot )    Lipid Medications or Supplements::  Fish oil/Omega 3, without side effects.    Hypertension:     Outpatient blood pressures:  Are not being checked    Dietary sodium intake::  No added salt diet  Hyperlipidemia     Depression & Anxiety Follow-up:     Depression/Anxiety:  Depression & Anxiety    Status since last visit::  Improved    Other associated symptoms of depression and anxiety::  None    Significant life event::  No    Current substance use::  None       Today's PHQ-9         PHQ-9 Total Score:         PHQ-9 Q9 Suicidal ideation:       Thoughts of suicide or self harm:      Self-harm Plan:        Self-harm Action:          Safety concerns for self or others:            Hyperlipidemia:     Low fat/chol diet rating::  Good    Taking Statins::  YES    Side effects from hypolipidemia medication::  Possible muscle aches from Statin (legs and foot )    Lipid Medications or Supplements::  Fish oil/Omega 3, without side effects.    Hypertension:     Outpatient blood pressures:  Are not being checked    Dietary sodium intake::  No added salt diet  Depression     Depression & Anxiety Follow-up:     Depression/Anxiety:  Depression & Anxiety     Status since last visit::  Improved    Other associated symptoms of depression and anxiety::  None    Significant life event::  No    Current substance use::  None       Today's PHQ-9         PHQ-9 Total Score:         PHQ-9 Q9 Suicidal ideation:       Thoughts of suicide or self harm:      Self-harm Plan:        Self-harm Action:          Safety concerns for self or others:            Hyperlipidemia:     Low fat/chol diet rating::  Good    Taking Statins::  YES    Side effects from hypolipidemia medication::  Possible muscle aches from Statin (legs and foot )    Lipid Medications or Supplements::  Fish oil/Omega 3, without side effects.    Hypertension:     Outpatient blood pressures:  Are not being checked    Dietary sodium intake::  No added salt diet    RIGHT KNEE PAIN- had seen orthopedics. Told arthritis. She had an injection. She has been slowly improving and able to increase her activity levels.     DEPRESSION- started back on prozac 10 mg daily. Feels like she is doing very well at this time.   PHQ-9 SCORE 7/31/2018   Total Score -   Total Score MyChart -   Total Score 2           Patient would like to talk to about her knee pain again and getting on Tizanidine.     Problem list and histories reviewed & adjusted, as indicated.  Additional history: as documented        BP Readings from Last 3 Encounters:   07/31/18 132/70   06/28/18 144/82   06/27/18 142/64    Wt Readings from Last 3 Encounters:   07/31/18 151 lb (68.5 kg)   04/27/18 164 lb 12.8 oz (74.8 kg)   10/04/17 161 lb 12.8 oz (73.4 kg)                    ROS:  CONSTITUTIONAL: NEGATIVE for fever, chills, change in weight  EYES: NEGATIVE for vision changes or irritation  ENT/MOUTH: NEGATIVE for ear, mouth and throat problems  RESP: NEGATIVE for significant cough or SOB  CV: NEGATIVE for chest pain, palpitations or peripheral edema  GI: NEGATIVE for nausea, abdominal pain, heartburn, or change in bowel habits  : NEGATIVE for frequency, dysuria, or  hematuria  MUSCULOSKELETAL:as above.   NEURO: NEGATIVE for weakness, dizziness or paresthesias  ENDOCRINE: NEGATIVE for temperature intolerance, skin/hair changes  HEME: NEGATIVE for bleeding problems  PSYCHIATRIC: NEGATIVE for changes in mood or affect    OBJECTIVE:     /70  Pulse 88  Temp 96.9  F (36.1  C) (Temporal)  Resp 16  Wt 151 lb (68.5 kg)  SpO2 97%  BMI 26.75 kg/m2  Body mass index is 26.75 kg/(m^2).  GENERAL: healthy, alert and no distress  NECK: no adenopathy, no asymmetry, masses, or scars and thyroid normal to palpation  RESP: lungs clear to auscultation - no rales, rhonchi or wheezes  CV: regular rate and rhythm, normal S1 S2, no S3 or S4, no murmur, click or rub, no peripheral edema and peripheral pulses strong  ABDOMEN: soft, nontender, no hepatosplenomegaly, no masses and bowel sounds normal  MS: right knee without erythema or effusion. Full range of motion.   PSYCH affect is bright. Casually dressed. Appropriate.         ASSESSMENT/PLAN:         1. Depression, major, in remission (H)  Doing well. Well controlled. Tolerating medication.  No change in plan.    - FLUoxetine (PROZAC) 10 MG capsule; Take 1 capsule (10 mg) by mouth daily  Dispense: 90 capsule; Refill: 3    2. Hypertension goal BP (blood pressure) < 140/90  Doing well. Well controlled. Tolerating medication.  No change in plan.    - hydrochlorothiazide (HYDRODIURIL) 25 MG tablet; Take 1 tablet (25 mg) by mouth daily  Dispense: 90 tablet; Refill: 1    3. Hyperlipidemia LDL goal <130  Awaiting results. Dose adjustment as needed.    - Lipid panel reflex to direct LDL Fasting  - **Comprehensive metabolic panel FUTURE anytime    4. Migraine without aura and without status migrainosus, not intractable  Doing well. Well controlled. Tolerating medication.  No change in plan.    - SUMAtriptan (IMITREX) 50 MG tablet; take 1 tablet at onset of headache may repeat in 2 hours as needed max 2 tabs/day  Dispense: 18 tablet; Refill: 2    5.  Right knee pain, unspecified chronicity  May use zanaflex for her knee but also has used this for occasional back issues.   She has been improving.   Recheck if worsening or concerns.   - tiZANidine (ZANAFLEX) 2 MG tablet; Take 1 tablet (2 mg) by mouth 2 times daily as needed for muscle spasms  Dispense: 90 tablet; Refill: 0    All questions invited, asked and answered to the patient's apparent satisfaction.  Patient agrees to plan.      JOE GOMEZ MD, MD  [facility]

## 2018-07-30 ENCOUNTER — TELEPHONE (OUTPATIENT)
Dept: FAMILY MEDICINE | Facility: CLINIC | Age: 73
End: 2018-07-30

## 2018-07-30 NOTE — LETTER
[facility]  [address], [address]  [place] [address]  Phone: [phone]  Fax: [phone]        July 30, 2018      Nicole Slaughter                                                                                                                                [address]  [place] [address]            Dear Ms. Slaughter,    We are concerned about your health care.  We would like you to schedule an appointment for Lab Only. This appointment will to draw your LDL (bad cholesterol) and BMP (basic metabolic panel).    At this time we ask that: You schedule a routine lab only visit.          Thank you,      Your [place] Team

## 2018-07-30 NOTE — TELEPHONE ENCOUNTER
Panel Management Review      Patient has the following on her problem list:     Depression / Dysthymia review    Measure:  Needs PHQ-9 score of 4 or less during index window.  Administer PHQ-9 and if score is 5 or more, send encounter to provider for next steps.      PHQ-9 SCORE 9/7/2016 11/15/2016 6/27/2018   Total Score - - -   Total Score MyChart - - -   Total Score 3 3 7       If PHQ-9 recheck is 5 or more, route to provider for next steps.    Patient is due for:  None    Hypertension   Last three blood pressure readings:  BP Readings from Last 3 Encounters:   06/28/18 144/82   06/27/18 142/64   04/27/18 136/80     Blood pressure: FAILED    HTN Guidelines:  Age 18-59 BP range:  Less than 140/90  Age 60-85 with Diabetes:  Less than 140/90  Age 60-85 without Diabetes:  less than 150/90      Composite cancer screening  Chart review shows that this patient is due/due soon for the following None  Summary:    Patient is due/failing the following:   BMP, Honoring Choices and LDL    Action needed:   Patient needs fasting lab only appointment    Type of outreach:    Sent letter.    Questions for provider review:    None                                                                                                                                    Catarina Whitehead MA        Chart routed to none .

## 2018-07-31 ENCOUNTER — OFFICE VISIT (OUTPATIENT)
Dept: FAMILY MEDICINE | Facility: CLINIC | Age: 73
End: 2018-07-31
Payer: COMMERCIAL

## 2018-07-31 VITALS
DIASTOLIC BLOOD PRESSURE: 70 MMHG | TEMPERATURE: 96.9 F | OXYGEN SATURATION: 97 % | SYSTOLIC BLOOD PRESSURE: 132 MMHG | WEIGHT: 151 LBS | BODY MASS INDEX: 26.75 KG/M2 | HEART RATE: 88 BPM | RESPIRATION RATE: 16 BRPM

## 2018-07-31 DIAGNOSIS — E78.5 HYPERLIPIDEMIA LDL GOAL <130: ICD-10-CM

## 2018-07-31 DIAGNOSIS — M25.561 RIGHT KNEE PAIN, UNSPECIFIED CHRONICITY: ICD-10-CM

## 2018-07-31 DIAGNOSIS — I10 HYPERTENSION GOAL BP (BLOOD PRESSURE) < 140/90: ICD-10-CM

## 2018-07-31 DIAGNOSIS — F32.5 DEPRESSION, MAJOR, IN REMISSION (H): Primary | ICD-10-CM

## 2018-07-31 DIAGNOSIS — G43.009 MIGRAINE WITHOUT AURA AND WITHOUT STATUS MIGRAINOSUS, NOT INTRACTABLE: ICD-10-CM

## 2018-07-31 LAB
ALBUMIN SERPL-MCNC: 3.8 G/DL (ref 3.4–5)
ALP SERPL-CCNC: 71 U/L (ref 40–150)
ALT SERPL W P-5'-P-CCNC: 26 U/L (ref 0–50)
ANION GAP SERPL CALCULATED.3IONS-SCNC: 5 MMOL/L (ref 3–14)
AST SERPL W P-5'-P-CCNC: 16 U/L (ref 0–45)
BILIRUB SERPL-MCNC: 0.6 MG/DL (ref 0.2–1.3)
BUN SERPL-MCNC: 18 MG/DL (ref 7–30)
CALCIUM SERPL-MCNC: 8.8 MG/DL (ref 8.5–10.1)
CHLORIDE SERPL-SCNC: 104 MMOL/L (ref 94–109)
CHOLEST SERPL-MCNC: 168 MG/DL
CO2 SERPL-SCNC: 31 MMOL/L (ref 20–32)
CREAT SERPL-MCNC: 0.7 MG/DL (ref 0.52–1.04)
GFR SERPL CREATININE-BSD FRML MDRD: 82 ML/MIN/1.7M2
GLUCOSE SERPL-MCNC: 92 MG/DL (ref 70–99)
HDLC SERPL-MCNC: 59 MG/DL
LDLC SERPL CALC-MCNC: 94 MG/DL
NONHDLC SERPL-MCNC: 109 MG/DL
POTASSIUM SERPL-SCNC: 4.5 MMOL/L (ref 3.4–5.3)
PROT SERPL-MCNC: 7.1 G/DL (ref 6.8–8.8)
SODIUM SERPL-SCNC: 140 MMOL/L (ref 133–144)
TRIGL SERPL-MCNC: 73 MG/DL

## 2018-07-31 PROCEDURE — 99214 OFFICE O/P EST MOD 30 MIN: CPT | Performed by: FAMILY MEDICINE

## 2018-07-31 PROCEDURE — 80061 LIPID PANEL: CPT | Performed by: FAMILY MEDICINE

## 2018-07-31 PROCEDURE — 80053 COMPREHEN METABOLIC PANEL: CPT | Performed by: FAMILY MEDICINE

## 2018-07-31 PROCEDURE — 36415 COLL VENOUS BLD VENIPUNCTURE: CPT | Performed by: FAMILY MEDICINE

## 2018-07-31 RX ORDER — FLUOXETINE 10 MG/1
10 CAPSULE ORAL DAILY
Qty: 90 CAPSULE | Refills: 3 | Status: SHIPPED | OUTPATIENT
Start: 2018-07-31 | End: 2019-08-15

## 2018-07-31 RX ORDER — ATORVASTATIN CALCIUM 40 MG/1
TABLET, FILM COATED ORAL
Qty: 10 TABLET | Refills: 0 | Status: CANCELLED | OUTPATIENT
Start: 2018-07-31

## 2018-07-31 RX ORDER — GABAPENTIN 100 MG/1
CAPSULE ORAL
Qty: 270 CAPSULE | Refills: 0 | Status: CANCELLED | OUTPATIENT
Start: 2018-07-31

## 2018-07-31 RX ORDER — SUMATRIPTAN 50 MG/1
TABLET, FILM COATED ORAL
Qty: 18 TABLET | Refills: 2 | Status: SHIPPED | OUTPATIENT
Start: 2018-07-31 | End: 2019-02-09

## 2018-07-31 RX ORDER — TIZANIDINE 2 MG/1
2 TABLET ORAL 2 TIMES DAILY PRN
Qty: 90 TABLET | Refills: 0 | Status: SHIPPED | OUTPATIENT
Start: 2018-07-31 | End: 2019-10-21

## 2018-07-31 RX ORDER — HYDROCHLOROTHIAZIDE 25 MG/1
25 TABLET ORAL DAILY
Qty: 90 TABLET | Refills: 1 | Status: SHIPPED | OUTPATIENT
Start: 2018-07-31 | End: 2019-02-25

## 2018-07-31 ASSESSMENT — ANXIETY QUESTIONNAIRES
GAD7 TOTAL SCORE: 2
2. NOT BEING ABLE TO STOP OR CONTROL WORRYING: SEVERAL DAYS
6. BECOMING EASILY ANNOYED OR IRRITABLE: NOT AT ALL
IF YOU CHECKED OFF ANY PROBLEMS ON THIS QUESTIONNAIRE, HOW DIFFICULT HAVE THESE PROBLEMS MADE IT FOR YOU TO DO YOUR WORK, TAKE CARE OF THINGS AT HOME, OR GET ALONG WITH OTHER PEOPLE: NOT DIFFICULT AT ALL
7. FEELING AFRAID AS IF SOMETHING AWFUL MIGHT HAPPEN: NOT AT ALL
3. WORRYING TOO MUCH ABOUT DIFFERENT THINGS: SEVERAL DAYS
5. BEING SO RESTLESS THAT IT IS HARD TO SIT STILL: NOT AT ALL
1. FEELING NERVOUS, ANXIOUS, OR ON EDGE: NOT AT ALL

## 2018-07-31 ASSESSMENT — PATIENT HEALTH QUESTIONNAIRE - PHQ9: 5. POOR APPETITE OR OVEREATING: NOT AT ALL

## 2018-07-31 ASSESSMENT — PAIN SCALES - GENERAL: PAINLEVEL: MODERATE PAIN (5)

## 2018-07-31 NOTE — MR AVS SNAPSHOT
After Visit Summary   7/31/2018    Nicole Slaughter    MRN: [id number]           Patient Information     Date Of Birth          1945        Visit Information        Provider Department      7/31/2018 11:20 AM Lizett Parikh MD [facility]        Today's Diagnoses     Depression, major, in remission (H)    -  1    Hypertension goal BP (blood pressure) < 140/90        Hyperlipidemia LDL goal <130        Migraine without aura and without status migrainosus, not intractable        Right knee pain, unspecified chronicity           Follow-ups after your visit        Follow-up notes from your care team     Return in about 1 year (around 7/31/2019) for Recheck.      Your next 10 appointments already scheduled     Apr 26, 2019 11:30 AM CDT   Return Visit with Elizabeth Dey MD   [facility]    [address] [place] [address] [phone]              Who to contact     If you have questions or need follow up information about today's clinic visit or your schedule please contact [facility] [place] directly at [phone].  Normal or non-critical lab and imaging results will be communicated to you by CryptoSealhart, letter or phone within 4 business days after the clinic has received the results. If you do not hear from us within 7 days, please contact the clinic through CryptoSealhart or phone. If you have a critical or abnormal lab result, we will notify you by phone as soon as possible.  Submit refill requests through QuickGifts or call your pharmacy and they will forward the refill request to us. Please allow 3 business days for your refill to be completed.          Additional Information About Your Visit        CryptoSealhart Information     [employer] gives you secure access to your electronic health record. If you see a primary care provider, you can also send messages to your care team and make appointments. If you have questions, please call  your primary care clinic.  If you do not have a primary care provider, please call [phone] and they will assist you.        Care EveryWhere ID     This is your Care EveryWhere ID. This could be used by other organizations to access your [place] medical records  [id number]        Your Vitals Were     Pulse Temperature Respirations Pulse Oximetry BMI (Body Mass Index)       88 96.9  F (36.1  C) (Temporal) 16 97% 26.75 kg/m2        Blood Pressure from Last 3 Encounters:   07/31/18 132/70   06/28/18 144/82   06/27/18 142/64    Weight from Last 3 Encounters:   07/31/18 151 lb (68.5 kg)   04/27/18 164 lb 12.8 oz (74.8 kg)   10/04/17 161 lb 12.8 oz (73.4 kg)              We Performed the Following     **Comprehensive metabolic panel FUTURE anytime     Lipid panel reflex to direct LDL Fasting          Today's Medication Changes          These changes are accurate as of 7/31/18 11:55 AM.  If you have any questions, ask your nurse or doctor.               Start taking these medicines.        Dose/Directions    tiZANidine 2 MG tablet   Commonly known as:  ZANAFLEX   Used for:  Right knee pain, unspecified chronicity        Dose:  2 mg   Take 1 tablet (2 mg) by mouth 2 times daily as needed for muscle spasms   Quantity:  90 tablet   Refills:  0         These medicines have changed or have updated prescriptions.        Dose/Directions    hydrochlorothiazide 25 MG tablet   Commonly known as:  HYDRODIURIL   This may have changed:  See the new instructions.   Used for:  Hypertension goal BP (blood pressure) < 140/90        Dose:  25 mg   Take 1 tablet (25 mg) by mouth daily   Quantity:  90 tablet   Refills:  1            Where to get your medicines      These medications were sent to [id number] - [facility] [place] - [address] [place] [address]  [address] [place] [address], [place] [address]     Phone:  [phone]     FLUoxetine 10 MG capsule    hydrochlorothiazide 25 MG tablet    SUMAtriptan 50 MG tablet    tiZANidine 2 MG tablet                 Primary Care Provider Office Phone # Fax #    Lizett Parikh [phone] [address] [address]        Equal Access to Services     THA MEYERS : Hadii aad ku hadmayelino Soomaali, waaxda luqadaha, qaybta kaalmada adeegyada, morena chungravin cazares. So [facility] [phone].    ATENCIÓN: Si habla español, tiene a urena disposición servicios gratuitos de asistencia lingüística. Llame al [phone].    We comply with applicable federal civil rights laws and [place] laws. We do not discriminate on the basis of race, color, national origin, age, disability, sex, sexual orientation, or gender identity.            Thank you!     Thank you for choosing [facility] (formerly [facility])  for your care. Our goal is always to provide you with excellent care. Hearing back from our patients is one way we can continue to improve our services. Please take a few minutes to complete the written survey that you may receive in the mail after your visit with us. Thank you!             Your Updated Medication List - Protect others around you: Learn how to safely use, store and throw away your medicines at www.disposemymeds.org.          This list is accurate as of 7/31/18 11:55 AM.  Always use your most recent med list.                   Brand Name Dispense Instructions for use Diagnosis    acyclovir 400 MG tablet    ZOVIRAX    60 tablet    TAKE 1 TABLET (400 MG) BY ORAL ROUTE 2 TIMES PER DAY    Herpes simplex virus infection       * atorvastatin 40 MG tablet    LIPITOR    10 tablet    TAKE 1 TABLET (40 MG) BY MOUTH TWICE A WEEK    Hyperlipidemia LDL goal <130       * atorvastatin 40 MG tablet    LIPITOR    10 tablet    TAKE ONE TABLET BY MOUTH TWICE WEEKLY    Hyperlipidemia LDL goal <130       CALCIUM-VITAMIN D PO      Take  by mouth daily.        FLUoxetine 10 MG capsule    PROzac    90 capsule    Take 1 capsule (10 mg) by mouth daily    Depression, major, in remission (H)       gabapentin  100 MG capsule    NEURONTIN    270 capsule    TAKE ONE CAPSULE BY MOUTH THREE TIMES DAILY    Migraine without aura and without status migrainosus, not intractable       hydrochlorothiazide 25 MG tablet    HYDRODIURIL    90 tablet    Take 1 tablet (25 mg) by mouth daily    Hypertension goal BP (blood pressure) < 140/90       HYDROcodone-acetaminophen 5-325 MG per tablet    NORCO    10 tablet    Take 1 tablet by mouth every 6 hours as needed for severe pain    Acute pain of right knee       MAGNESIUM PO      Take by mouth daily        OMEGA 3 PO      Reported on 5/19/2017        omeprazole 20 MG CR capsule    priLOSEC    30 capsule    TAKE ONE CAPSULE BY MOUTH EVERY DAY BEFORE A MEAL    Gastroesophageal reflux disease, esophagitis presence not specified       PROBIOTIC DAILY PO      Take by mouth daily        SUMAtriptan 50 MG tablet    IMITREX    18 tablet    take 1 tablet at onset of headache may repeat in 2 hours as needed max 2 tabs/day    Migraine without aura and without status migrainosus, not intractable       tiZANidine 2 MG tablet    ZANAFLEX    90 tablet    Take 1 tablet (2 mg) by mouth 2 times daily as needed for muscle spasms    Right knee pain, unspecified chronicity       triamcinolone 0.1 % cream    KENALOG    15 g    Apply topically 2 times daily as needed for irritation Apply sparingly to affected area three times daily for 14 days.    Contact dermatitis, unspecified contact dermatitis type, unspecified trigger       TURMERIC COMPLEX/BLACK PEPPER PO      Take by mouth daily Cameron paste        * Notice:  This list has 2 medication(s) that are the same as other medications prescribed for you. Read the directions carefully, and ask your doctor or other care provider to review them with you.

## 2018-08-01 ASSESSMENT — ANXIETY QUESTIONNAIRES: GAD7 TOTAL SCORE: 2

## 2018-08-01 ASSESSMENT — PATIENT HEALTH QUESTIONNAIRE - PHQ9: SUM OF ALL RESPONSES TO PHQ QUESTIONS 1-9: 2

## 2018-10-17 ENCOUNTER — MYC MEDICAL ADVICE (OUTPATIENT)
Dept: FAMILY MEDICINE | Facility: CLINIC | Age: 73
End: 2018-10-17

## 2018-10-17 DIAGNOSIS — E78.5 HYPERLIPIDEMIA LDL GOAL <130: ICD-10-CM

## 2018-10-17 DIAGNOSIS — G43.009 MIGRAINE WITHOUT AURA AND WITHOUT STATUS MIGRAINOSUS, NOT INTRACTABLE: ICD-10-CM

## 2018-10-17 RX ORDER — GABAPENTIN 100 MG/1
CAPSULE ORAL
Qty: 270 CAPSULE | Refills: 3 | Status: SHIPPED | OUTPATIENT
Start: 2018-10-17 | End: 2019-08-13

## 2018-10-17 NOTE — TELEPHONE ENCOUNTER
gabapentin (NEURONTIN) 100 MG capsule      Last Written Prescription Date:  07/18/2018  Last Fill Quantity: 270,   # refills: 0  Last Office Visit: 07/31/2018  Future Office visit:       Routing refill request to provider for review/approval because:  Drug not on the [facility], [facility] or [facility] refill protocol or controlled substance  Natasha Maradiaga RN, BSN

## 2018-10-18 RX ORDER — GABAPENTIN 100 MG/1
CAPSULE ORAL
Qty: 270 CAPSULE | Refills: 0 | OUTPATIENT
Start: 2018-10-18

## 2018-10-18 RX ORDER — ATORVASTATIN CALCIUM 40 MG/1
TABLET, FILM COATED ORAL
Qty: 24 TABLET | Refills: 1 | Status: SHIPPED | OUTPATIENT
Start: 2018-10-18 | End: 2019-04-24

## 2018-10-18 NOTE — TELEPHONE ENCOUNTER
"Requested Prescriptions   Pending Prescriptions Disp Refills     gabapentin (NEURONTIN) 100 MG capsule [Pharmacy Med Name: Gabapentin Oral Capsule 100 MG] 270 capsule 0     Sig: TAKE ONE CAPSULE BY MOUTH THREE TIMES DAILY    There is no refill protocol information for this order        atorvastatin (LIPITOR) 40 MG tablet [Pharmacy Med Name: Atorvastatin Calcium Oral Tablet 40 MG] 10 tablet 0     Sig: TAKE ONE TABLET BY MOUTH TWICE WEEKLY    Statins Protocol Passed    10/17/2018 11:49 AM       Passed - LDL on file in past 12 months    Recent Labs   Lab Test  07/31/18   1212   LDL  94          Passed - No abnormal creatine kinase in past 12 months    No lab results found.          Passed - Recent (12 mo) or future (30 days) visit within the authorizing provider's specialty    Patient had office visit in the last 12 months or has a visit in the next 30 days with authorizing provider or within the authorizing provider's specialty.  See \"Patient Info\" tab in inbasket, or \"Choose Columns\" in Meds & Orders section of the refill encounter.           Passed - Patient is age 18 or older       Passed - No active pregnancy on record       Passed - No positive pregnancy test in past 12 months        gabapentin (NEURONTIN) 100 MG capsule  Rx was sent 10/017/2018 for 270 tabs and 3 refills. [facility]   Pharmacy notified via E-prescribe refusal.    atorvastatin (LIPITOR) 40 MG tablet  Routing refill request to provider for review/approval because:  A break in medication, should have needed refills 08/2018    Natasha Maradiaga RN, BSN           "

## 2018-11-29 ENCOUNTER — MYC REFILL (OUTPATIENT)
Dept: FAMILY MEDICINE | Facility: CLINIC | Age: 73
End: 2018-11-29

## 2018-11-29 DIAGNOSIS — B00.9 HERPES SIMPLEX VIRUS INFECTION: ICD-10-CM

## 2018-11-29 RX ORDER — ACYCLOVIR 400 MG/1
TABLET ORAL
Qty: 60 TABLET | Refills: 4 | Status: SHIPPED | OUTPATIENT
Start: 2018-11-29 | End: 2019-08-13

## 2018-11-29 NOTE — TELEPHONE ENCOUNTER
Acyclovir    Prescription approved per [facility] Refill Protocol.    Suki Camargo, RN, BSN

## 2018-11-29 NOTE — TELEPHONE ENCOUNTER
Message from LensAR[employer]:  Original authorizing provider: JOE GOMEZ MD, MD Nicole Slaughter would like a refill of the following medications:  acyclovir (ZOVIRAX) 400 MG tablet [JOE GOMEZ MD, MD]    Preferred pharmacy: [id number] - [place] - [address] [place] [address]    Comment:

## 2019-02-09 DIAGNOSIS — G43.009 MIGRAINE WITHOUT AURA AND WITHOUT STATUS MIGRAINOSUS, NOT INTRACTABLE: ICD-10-CM

## 2019-02-11 RX ORDER — SUMATRIPTAN 50 MG/1
TABLET, FILM COATED ORAL
Qty: 18 TABLET | Refills: 1 | Status: SHIPPED | OUTPATIENT
Start: 2019-02-11 | End: 2019-08-13

## 2019-02-11 NOTE — TELEPHONE ENCOUNTER
Imitrex        Prescription approved per [facility] Refill Protocol.    Suki Camargo, RN, BSN

## 2019-02-25 DIAGNOSIS — I10 HYPERTENSION GOAL BP (BLOOD PRESSURE) < 140/90: ICD-10-CM

## 2019-02-25 RX ORDER — HYDROCHLOROTHIAZIDE 25 MG/1
25 TABLET ORAL DAILY
Qty: 90 TABLET | Refills: 1 | Status: SHIPPED | OUTPATIENT
Start: 2019-02-25 | End: 2019-08-13 | Stop reason: ALTCHOICE

## 2019-02-26 NOTE — TELEPHONE ENCOUNTER
Hydrochlorothiazide:  Prescription approved per [facility] Refill Protocol.    Gay Weir, RN, BSN

## 2019-04-24 DIAGNOSIS — E78.5 HYPERLIPIDEMIA LDL GOAL <130: ICD-10-CM

## 2019-04-25 RX ORDER — ATORVASTATIN CALCIUM 40 MG/1
TABLET, FILM COATED ORAL
Qty: 24 TABLET | Refills: 0 | Status: SHIPPED | OUTPATIENT
Start: 2019-04-25 | End: 2019-07-27

## 2019-04-25 NOTE — TELEPHONE ENCOUNTER
Lipitor  Prescription approved per [facility] Refill Protocol.    Gay Weir, RN, BSN

## 2019-04-26 ENCOUNTER — OFFICE VISIT (OUTPATIENT)
Dept: ENDOCRINOLOGY | Facility: CLINIC | Age: 74
End: 2019-04-26
Payer: COMMERCIAL

## 2019-04-26 VITALS
WEIGHT: 159.5 LBS | SYSTOLIC BLOOD PRESSURE: 153 MMHG | HEART RATE: 70 BPM | OXYGEN SATURATION: 96 % | BODY MASS INDEX: 28.25 KG/M2 | DIASTOLIC BLOOD PRESSURE: 81 MMHG

## 2019-04-26 DIAGNOSIS — E04.1 THYROID NODULE: Primary | ICD-10-CM

## 2019-04-26 DIAGNOSIS — M85.839 OSTEOPENIA OF FOREARM, UNSPECIFIED LATERALITY: ICD-10-CM

## 2019-04-26 DIAGNOSIS — M81.0 OSTEOPOROSIS, UNSPECIFIED OSTEOPOROSIS TYPE, UNSPECIFIED PATHOLOGICAL FRACTURE PRESENCE: ICD-10-CM

## 2019-04-26 LAB
T4 FREE SERPL-MCNC: 0.98 NG/DL (ref 0.76–1.46)
TSH SERPL DL<=0.005 MIU/L-ACNC: 1.74 MU/L (ref 0.4–4)

## 2019-04-26 PROCEDURE — 84439 ASSAY OF FREE THYROXINE: CPT | Performed by: INTERNAL MEDICINE

## 2019-04-26 PROCEDURE — 36415 COLL VENOUS BLD VENIPUNCTURE: CPT | Performed by: INTERNAL MEDICINE

## 2019-04-26 PROCEDURE — 99214 OFFICE O/P EST MOD 30 MIN: CPT | Performed by: INTERNAL MEDICINE

## 2019-04-26 PROCEDURE — 84443 ASSAY THYROID STIM HORMONE: CPT | Performed by: INTERNAL MEDICINE

## 2019-04-26 NOTE — LETTER
4/26/2019         RE: Nicole Slaughter  [address] [place] [address]        Dear Colleague,    Thank you for referring your patient, Nicole Slaughter, to the [facility]. Please see a copy of my visit note below.                                                     - Endocrinology Follow up -    Reason for visit/consult: osteopenia, thyroid nodule    Primary care provider: Lizett Parikh    Assessment and Plan  A 74 year old female with thyroid nodule 2 cm (AUS then benign), and osteopenia,    # Thyroid nodule  thyroid nodule 2 cm (AUS then benign 12/2016 and 4/2017), repeated ultrasound in 4/2018 measured slight increase but by reviewing original images stable, almost no change. We will follow up once more time and if stable then extend follow up every 3 years.      # Osteopenia   Since 2008.   Asymptomatic , no joint pain no hip pain , doing active life .  Post Reclast to therapy 1 time in July 2017, then help in 2018 .  - Continue current exercise/active life  - Continue calcium citrate plus Vitamin D tab (Ca 1260 mg, Vit D 1000 IU)  - next bone density this year due    # hair loss   Improving, currently taking biotin    # screening for thyroid level  Currently euthyroid.      -RTC with me in 1 year       I spent 30 minutes with this patient face to face and explained the conditions and plans (more than 50% of time was counseling/coordination of care, bone health, long term management of osteopenia) . The patient understood and is satisfied with today's visit. Return to clinic with me in 1 year.         Elizabeth Dey MD  Staff Physician  Endocrinology and Metabolism  License: [id number]      HPI: A 72 yo male with follow up right thyroid Nodule 2 cm solid, first FNA was AUS in 2017, then repeated one was benign on 4/7/2017.  Here for the annual follow-up.  Patient underwent thyroid ultrasound April 2018 , which shows no change in size of her right nodule.     Last time we also  discussed bone density result which was osteopenia with radius T score -2.1. Patient received Reclast once in 7/2017.  Also she has been taking Calcium supplement daily basis (Ca 1260 mg, Vit D 1000 IU)  with excellent compliance.  No joint pain no hip pain no history of fractures.     Today's her main concern is hair loss and weight gain.  She started to notice hair loss especially in her eyebrow for the past around 1 year.  Also she gained 11 pounds since last year.  Her energy level is slightly low, requiring nap daytime half an hour.  Her sleeping pattern became irregular, she could not sleep well and frequently wakes up.  She mentioned she has been taking Biotene supplement for the past few years to tablet with unknown dose.  She is also taking MSM (methylsulfonylmethane) for her hair.     Energy level: slight lower side, requires nap during the day  Dry skin: no  Hair loss:slightly yes, eye brow losing  Weight changes:  BM:no  Concentration: decerasing  Forgetfulness:  Family history of thyroid disease: maternal cousin: thyroid disease,       Past Medical/Surgical History:  Past Medical History:   Diagnosis Date     Cervicalgia      Depressive disorder      Generalized osteoarthrosis, unspecified site      Migraine, unspecified, without mention of intractable migraine without mention of status migrainosus      Thyroid nodule 12/1/2016     Unspecified essential hypertension      Past Surgical History:   Procedure Laterality Date     HYSTERECTOMY, TANG       ORTHOPEDIC SURGERY       SOFT TISSUE SURGERY         Allergies:  Allergies   Allergen Reactions     Tylenol W/Codeine [Acetaminophen-Codeine] Itching     codeine     Percocet [Oxycodone-Acetaminophen] Rash     Sulfa Drugs Rash       Current Medications   Current Outpatient Medications   Medication     acyclovir (ZOVIRAX) 400 MG tablet     atorvastatin (LIPITOR) 40 MG tablet     Black Pepper-Turmeric (TURMERIC COMPLEX/BLACK PEPPER PO)     CALCIUM-VITAMIN D PO  "    FLUoxetine (PROZAC) 10 MG capsule     gabapentin (NEURONTIN) 100 MG capsule     hydrochlorothiazide (HYDRODIURIL) 25 MG tablet     MAGNESIUM PO     Omega-3 Fatty Acids (OMEGA 3 PO)     SUMAtriptan (IMITREX) 50 MG tablet     atorvastatin (LIPITOR) 40 MG tablet     HYDROcodone-acetaminophen (NORCO) 5-325 MG per tablet     omeprazole (PRILOSEC) 20 MG CR capsule     Probiotic Product (PROBIOTIC DAILY PO)     tiZANidine (ZANAFLEX) 2 MG tablet     triamcinolone (KENALOG) 0.1 % cream     No current facility-administered medications for this visit.        Family History:  Family History   Problem Relation Age of Onset     Arthritis Mother      Lipids Mother      Hypertension Mother      Breast Cancer Mother      Anesthesia Reaction Mother      Heart Disease Father      Cerebrovascular Disease Father      Arthritis Maternal Grandmother      Breast Cancer Maternal Aunt      Breast Cancer Maternal Aunt      Diabetes No family hx of      Coronary Artery Disease No family hx of      Hyperlipidemia No family hx of      Prostate Cancer No family hx of      Other Cancer No family hx of      Anxiety Disorder No family hx of      Mental Illness No family hx of      Substance Abuse No family hx of        Social History:  Social History     Tobacco Use     Smoking status: Never Smoker     Smokeless tobacco: Never Used   Substance Use Topics     Alcohol use: Yes     Comment: wine       ROS:  Full review of systems taken with the help of the intake sheet. Otherwise a complete 14 point review of systems was taken and is negative unless stated in the history above.    Physical Exam:   Blood pressure 136/80, pulse 99, height 1.6 m (5' 3\"), weight 74.8 kg (164 lb 12.8 oz)  General: well appearing, no acute distress, pleasant and conversant,   Mental Status/neuro: alert and oriented  Face: symmetrical, normal facial color  Eyes: anicteric, PERRL, no proptosis or lid lag  Back: no kyphosis  Neck: suppler, no lymphadenopahty  Thyroid: " normal size and texture, no nodule palpable, no bruits  Heart: regular rhythm, S1S2, no murmur appreciated  Lung: clear to auscultation bilaterally  Abdomen: soft, NT/ND, no hepatomegaly  Legs: no swelling or edema        Labs (General):      Ref. Range 11/15/2016 15:09 2/3/2017 11:32 4/27/2018 12:08 4/26/2019 11:54   TSH Latest Ref Range: 0.40 - 4.00 mU/L 1.28 1.84 1.78 1.74   T4 Free Latest Ref Range: 0.76 - 1.46 ng/dL 1.04 1.06 1.05 0.98       5/1/2017 Bone density     HISTORY: Asymptomatic premature menopause     COMPARISON:   none     Age: 72  years.  Height: 63 inches  Weight: 158 pounds  Sex: Female  Ethnicity: White     Image quality: Adequate     Lumbar spine T-score in region of L1-L4, with the exception of L3 =  1.7      HIPS:  Mean total hip T-score: -0.1     Left femoral neck T-score = -0.9  Right femoral neck T-score= -0.9      Radius 33% T-score = -2.1      IMPRESSION:  Osteopenia    US THYROID, 4/16/2018: I also personally reviewed the original images, explained the patient and agree below report.     COMPARISON: 4/7/2017, 12/6/2016, 11/25/2016     HISTORY: Right thyroid nodule.     Technique: Grayscale and color ultrasound imaging of the thyroid was  performed.     Findings:    Thyroid parenchyma: Homogeneous on the right, heterogeneous on the  left.  The right lobe of the thyroid measures: 4.1 x 2 x 2.3 cm   The thyroid isthmus measures: 0.3 cm   The left lobe of the thyroid measures: 3.9 x 1.4 x 1.3 cm      Right lobe:  Nodule 1: Mid portion, previously biopsied  Nodule measurement: 2 x 1.9 x 1.6 cm , previously 2 x 1.8 x 1.5 cm  Echogenicity: Isoechoic  Consistency: solid  Calcifications: no  Hypervascular: yes  Interval growth (>20%): no      Impression:  Stable right thyroid nodule.             Again, thank you for allowing me to participate in the care of your patient.        Sincerely,        Elizabeth Dey MD

## 2019-04-26 NOTE — NURSING NOTE
Nicole Slaughter's goals for this visit include: Thyroid follow up  She requests these members of her care team be copied on today's visit information: yes    PCP: Lizett Parikh    Referring Provider:  No referring provider defined for this encounter.    /81 (BP Location: Left arm, Patient Position: Chair, Cuff Size: Adult Regular)   Pulse 70   Wt 72.3 kg (159 lb 8 oz)   SpO2 96%   BMI 28.25 kg/m      Do you need any medication refills at today's visit? no

## 2019-04-26 NOTE — PROGRESS NOTES
- Endocrinology Follow up -    Reason for visit/consult: osteopenia, thyroid nodule    Primary care provider: Lizett Parikh    Assessment and Plan  A 74 year old female with thyroid nodule 2 cm (AUS then benign), and osteopenia,    # Thyroid nodule  thyroid nodule 2 cm (AUS then benign 12/2016 and 4/2017), repeated ultrasound in 4/2018 measured slight increase but by reviewing original images stable, almost no change. We will follow up once more time and if stable then extend follow up every 3 years.      # Osteopenia   Since 2008.   Asymptomatic , no joint pain no hip pain , doing active life .  Post Reclast to therapy 1 time in July 2017, then help in 2018 .  - Continue current exercise/active life  - Continue calcium citrate plus Vitamin D tab (Ca 1260 mg, Vit D 1000 IU)  - next bone density this year due    # hair loss   Improving, currently taking biotin    # screening for thyroid level  Currently euthyroid.      -RTC with me in 1 year       I spent 30 minutes with this patient face to face and explained the conditions and plans (more than 50% of time was counseling/coordination of care, bone health, long term management of osteopenia) . The patient understood and is satisfied with today's visit. Return to clinic with me in 1 year.         Elizabeth Dey MD  Staff Physician  Endocrinology and Metabolism  License: [id number]      HPI: A 72 yo male with follow up right thyroid Nodule 2 cm solid, first FNA was AUS in 2017, then repeated one was benign on 4/7/2017.  Here for the annual follow-up.  Patient underwent thyroid ultrasound April 2018 , which shows no change in size of her right nodule.     Last time we also discussed bone density result which was osteopenia with radius T score -2.1. Patient received Reclast once in 7/2017.  Also she has been taking Calcium supplement daily basis (Ca 1260 mg, Vit D 1000 IU)  with excellent compliance.  No joint pain no hip  pain no history of fractures.     Today's her main concern is hair loss and weight gain.  She started to notice hair loss especially in her eyebrow for the past around 1 year.  Also she gained 11 pounds since last year.  Her energy level is slightly low, requiring nap daytime half an hour.  Her sleeping pattern became irregular, she could not sleep well and frequently wakes up.  She mentioned she has been taking Biotene supplement for the past few years to tablet with unknown dose.  She is also taking MSM (methylsulfonylmethane) for her hair.     Energy level: slight lower side, requires nap during the day  Dry skin: no  Hair loss:slightly yes, eye brow losing  Weight changes:  BM:no  Concentration: decerasing  Forgetfulness:  Family history of thyroid disease: maternal cousin: thyroid disease,       Past Medical/Surgical History:  Past Medical History:   Diagnosis Date     Cervicalgia      Depressive disorder      Generalized osteoarthrosis, unspecified site      Migraine, unspecified, without mention of intractable migraine without mention of status migrainosus      Thyroid nodule 12/1/2016     Unspecified essential hypertension      Past Surgical History:   Procedure Laterality Date     HYSTERECTOMY, TANG       ORTHOPEDIC SURGERY       SOFT TISSUE SURGERY         Allergies:  Allergies   Allergen Reactions     Tylenol W/Codeine [Acetaminophen-Codeine] Itching     codeine     Percocet [Oxycodone-Acetaminophen] Rash     Sulfa Drugs Rash       Current Medications   Current Outpatient Medications   Medication     acyclovir (ZOVIRAX) 400 MG tablet     atorvastatin (LIPITOR) 40 MG tablet     Black Pepper-Turmeric (TURMERIC COMPLEX/BLACK PEPPER PO)     CALCIUM-VITAMIN D PO     FLUoxetine (PROZAC) 10 MG capsule     gabapentin (NEURONTIN) 100 MG capsule     hydrochlorothiazide (HYDRODIURIL) 25 MG tablet     MAGNESIUM PO     Omega-3 Fatty Acids (OMEGA 3 PO)     SUMAtriptan (IMITREX) 50 MG tablet     atorvastatin (LIPITOR) 40  "MG tablet     HYDROcodone-acetaminophen (NORCO) 5-325 MG per tablet     omeprazole (PRILOSEC) 20 MG CR capsule     Probiotic Product (PROBIOTIC DAILY PO)     tiZANidine (ZANAFLEX) 2 MG tablet     triamcinolone (KENALOG) 0.1 % cream     No current facility-administered medications for this visit.        Family History:  Family History   Problem Relation Age of Onset     Arthritis Mother      Lipids Mother      Hypertension Mother      Breast Cancer Mother      Anesthesia Reaction Mother      Heart Disease Father      Cerebrovascular Disease Father      Arthritis Maternal Grandmother      Breast Cancer Maternal Aunt      Breast Cancer Maternal Aunt      Diabetes No family hx of      Coronary Artery Disease No family hx of      Hyperlipidemia No family hx of      Prostate Cancer No family hx of      Other Cancer No family hx of      Anxiety Disorder No family hx of      Mental Illness No family hx of      Substance Abuse No family hx of        Social History:  Social History     Tobacco Use     Smoking status: Never Smoker     Smokeless tobacco: Never Used   Substance Use Topics     Alcohol use: Yes     Comment: wine       ROS:  Full review of systems taken with the help of the intake sheet. Otherwise a complete 14 point review of systems was taken and is negative unless stated in the history above.    Physical Exam:   Blood pressure 136/80, pulse 99, height 1.6 m (5' 3\"), weight 74.8 kg (164 lb 12.8 oz)  General: well appearing, no acute distress, pleasant and conversant,   Mental Status/neuro: alert and oriented  Face: symmetrical, normal facial color  Eyes: anicteric, PERRL, no proptosis or lid lag  Back: no kyphosis  Neck: suppler, no lymphadenopahty  Thyroid: normal size and texture, no nodule palpable, no bruits  Heart: regular rhythm, S1S2, no murmur appreciated  Lung: clear to auscultation bilaterally  Abdomen: soft, NT/ND, no hepatomegaly  Legs: no swelling or edema        Labs (General):      Ref. Range " 11/15/2016 15:09 2/3/2017 11:32 4/27/2018 12:08 4/26/2019 11:54   TSH Latest Ref Range: 0.40 - 4.00 mU/L 1.28 1.84 1.78 1.74   T4 Free Latest Ref Range: 0.76 - 1.46 ng/dL 1.04 1.06 1.05 0.98       5/1/2017 Bone density     HISTORY: Asymptomatic premature menopause     COMPARISON:   none     Age: 72  years.  Height: 63 inches  Weight: 158 pounds  Sex: Female  Ethnicity: White     Image quality: Adequate     Lumbar spine T-score in region of L1-L4, with the exception of L3 =  1.7      HIPS:  Mean total hip T-score: -0.1     Left femoral neck T-score = -0.9  Right femoral neck T-score= -0.9      Radius 33% T-score = -2.1      IMPRESSION:  Osteopenia    US THYROID, 4/16/2018: I also personally reviewed the original images, explained the patient and agree below report.     COMPARISON: 4/7/2017, 12/6/2016, 11/25/2016     HISTORY: Right thyroid nodule.     Technique: Grayscale and color ultrasound imaging of the thyroid was  performed.     Findings:    Thyroid parenchyma: Homogeneous on the right, heterogeneous on the  left.  The right lobe of the thyroid measures: 4.1 x 2 x 2.3 cm   The thyroid isthmus measures: 0.3 cm   The left lobe of the thyroid measures: 3.9 x 1.4 x 1.3 cm      Right lobe:  Nodule 1: Mid portion, previously biopsied  Nodule measurement: 2 x 1.9 x 1.6 cm , previously 2 x 1.8 x 1.5 cm  Echogenicity: Isoechoic  Consistency: solid  Calcifications: no  Hypervascular: yes  Interval growth (>20%): no      Impression:  Stable right thyroid nodule.

## 2019-04-26 NOTE — PATIENT INSTRUCTIONS
If you are taking Calcium (including Tums), Multivitamin, or Vitamin D; Please discontinue 1 day prior and day of DEXA Scan. It is ok to resume these medications after the scan is complete unless indicated by your doctor. Please check in at the West Entrance. Desk B.    Please call [phone] to schedule DEXA at [facility].

## 2019-06-04 ENCOUNTER — TELEPHONE (OUTPATIENT)
Dept: ENDOCRINOLOGY | Facility: CLINIC | Age: 74
End: 2019-06-04

## 2019-06-04 ENCOUNTER — ANCILLARY PROCEDURE (OUTPATIENT)
Dept: ULTRASOUND IMAGING | Facility: CLINIC | Age: 74
End: 2019-06-04
Attending: INTERNAL MEDICINE
Payer: COMMERCIAL

## 2019-06-04 ENCOUNTER — ANCILLARY PROCEDURE (OUTPATIENT)
Dept: BONE DENSITY | Facility: CLINIC | Age: 74
End: 2019-06-04
Attending: INTERNAL MEDICINE
Payer: COMMERCIAL

## 2019-06-04 DIAGNOSIS — E04.1 THYROID NODULE: ICD-10-CM

## 2019-06-04 DIAGNOSIS — M81.0 OSTEOPOROSIS, UNSPECIFIED OSTEOPOROSIS TYPE, UNSPECIFIED PATHOLOGICAL FRACTURE PRESENCE: ICD-10-CM

## 2019-06-04 DIAGNOSIS — E04.1 THYROID NODULE: Primary | ICD-10-CM

## 2019-06-04 DIAGNOSIS — M81.8 OTHER OSTEOPOROSIS WITHOUT CURRENT PATHOLOGICAL FRACTURE: ICD-10-CM

## 2019-06-04 PROCEDURE — 76536 US EXAM OF HEAD AND NECK: CPT

## 2019-06-04 PROCEDURE — 77081 DXA BONE DENSITY APPENDICULR: CPT | Performed by: RADIOLOGY

## 2019-06-04 PROCEDURE — 77080 DXA BONE DENSITY AXIAL: CPT | Mod: 59 | Performed by: RADIOLOGY

## 2019-06-04 NOTE — TELEPHONE ENCOUNTER
Before calling patient with DEXA scan results, will also send patient's thyroid ultrasound results to on-call provider to review.       EXAMINATION: US THYROID, 6/4/2019 11:36 AM      COMPARISON: 4/16/2018, 12/6/2016, 11/25/2016     HISTORY: Thyroid nodule. Previous biopsy of nodule.     Technique: Grayscale and color ultrasound imaging of the thyroid was  performed.     Findings:    Thyroid parenchyma: homogenous      The right lobe of the thyroid measures: 4.6 x 2.2 x 2.3 cm     The thyroid isthmus measures: 0.4 cm thick     The left lobe of the thyroid measures: 4.1 x 1.3 x 1.1 cm     Right lobe:  Nodule 1:  Nodule measurement: 2.1 x 2.1 x 2.0 cm, most recently 2.0 x 1.9 x 1.6  cm  Echogenicity: Hyperechoic  Consistency: solid  Calcifications: no  Hypervascular: yes     Isthmus: No nodule     Left Lobe: No nodule                                                                      Impression: Slight increase in size of solid nodule in the right lobe  of the thyroid gland. This was biopsied in 2016.     MD Swathi CARRILLO, RN  Endocrine Care Coordinator  [facility]

## 2019-06-04 NOTE — TELEPHONE ENCOUNTER
Given the size increase over the years, I recommend repeating FNa  I ordered it for her.   Eleanor Zarco MD  [id number]  Endocrinology Service

## 2019-06-04 NOTE — TELEPHONE ENCOUNTER
Results received for patient's DEXA hip/spine and radius (see below for details):      Per 4/26/19 progress note from Dr. Dey:    # Osteopenia   Since 2008.   Asymptomatic , no joint pain no hip pain , doing active life .  Post Reclast to therapy 1 time in July 2017, then help in 2018 .  - Continue current exercise/active life  - Continue calcium citrate plus Vitamin D tab (Ca 1260 mg, Vit D 1000 IU)  - next bone density this year due      Patient sees Dr. Dey. In Dr. Dey's absence (will be back August) will send to on- call to review.                  HISTORY: Other osteoporosis without current pathological fracture     COMPARISON:   5/1/2017     Age[age]  years.  Height: 63 inches  Weight: 158 pounds  Sex: Female  Ethnicity: White     Image quality: Adequate     Lumbar spine T-score in region of L1-L4 (L3) = 3   Lumbar percent change: 11.5%      HIPS:  Mean total hip T-score: 0.1  Mean total hip percent change: 2.9%      Left femoral neck T-score = -0.8  Right femoral neck T-score= -0.9      Radius 33% T-score = -1.3  Radius 33% percent change: 9.6%      FRAX:  10 year probability of major osteoporotic fracture: 8.9%  10 year probability of hip fracture: 1%  The 10 year probability of fracture may be lower than reported if the  patient has received treatment. FRAX data should be disregarded in  patient's taking bisphosphonates.     World Health Organization definition of osteoporosis and osteopenia  for  women:   Normal: T-score at or above -1.0  Low Bone Mass (Osteopenia): T-score between -1.0 and -2.5.   Osteoporosis: T-score at or below -2.5   T-scores are reported for postmenopausal women and men over 50 years  of age.                                                                      IMPRESSION:  Osteopenia of the radius. Significant increase in overall  bone mass.     WILIAM CASTILLO MD      HISTORY: Other osteoporosis without current pathological fracture     COMPARISON:    5/1/2017     Age[age]  years.  Height: 63 inches  Weight: 158 pounds  Sex: Female  Ethnicity: White     Image quality: Adequate     Lumbar spine T-score in region of L1-L4 (L3) = 3   Lumbar percent change: 11.5%      HIPS:  Mean total hip T-score: 0.1  Mean total hip percent change: 2.9%      Left femoral neck T-score = -0.8  Right femoral neck T-score= -0.9      Radius 33% T-score = -1.3  Radius 33% percent change: 9.6%      FRAX:  10 year probability of major osteoporotic fracture: 8.9%  10 year probability of hip fracture: 1%  The 10 year probability of fracture may be lower than reported if the  patient has received treatment. FRAX data should be disregarded in  patient's taking bisphosphonates.     World Health Organization definition of osteoporosis and osteopenia  for  women:   Normal: T-score at or above -1.0  Low Bone Mass (Osteopenia): T-score between -1.0 and -2.5.   Osteoporosis: T-score at or below -2.5   T-scores are reported for postmenopausal women and men over 50 years  of age.     -----------------------------------                                                         IMPRESSION:  Osteopenia of the radius. Significant increase in overall  bone mass.     WILIAM CASTILLO MD

## 2019-06-04 NOTE — TELEPHONE ENCOUNTER
Overall improved Bone density, no change in plans based on this report. Follow-up with Dr Dey once she is back.     Eleanor Zarco MD  [id number]  Endocrinology Service

## 2019-06-05 NOTE — TELEPHONE ENCOUNTER
Patient advised of DEXA results and Thyroid ultrasound results. Also advised patient of Dr. Zarco's recommendations in Dr. Dey's absence. Patient verbalizes understanding and agrees to plan.    Transferred patient to imaging scheduling.       Swathi Santana RN  Endocrine Care Coordinator  [facility]

## 2019-06-10 ENCOUNTER — ANCILLARY PROCEDURE (OUTPATIENT)
Dept: ULTRASOUND IMAGING | Facility: CLINIC | Age: 74
End: 2019-06-10
Attending: INTERNAL MEDICINE
Payer: COMMERCIAL

## 2019-06-10 DIAGNOSIS — E04.1 THYROID NODULE: ICD-10-CM

## 2019-06-10 PROCEDURE — 00000102 ZZHCL STATISTIC CYTO WRIGHT STAIN TC: Performed by: RADIOLOGY

## 2019-06-10 PROCEDURE — 10005 FNA BX W/US GDN 1ST LES: CPT | Performed by: RADIOLOGY

## 2019-06-10 PROCEDURE — 88173 CYTOPATH EVAL FNA REPORT: CPT | Performed by: RADIOLOGY

## 2019-06-10 RX ORDER — LIDOCAINE HYDROCHLORIDE 10 MG/ML
9 INJECTION, SOLUTION EPIDURAL; INFILTRATION; INTRACAUDAL; PERINEURAL ONCE
Status: COMPLETED | OUTPATIENT
Start: 2019-06-10 | End: 2019-06-10

## 2019-06-10 RX ADMIN — LIDOCAINE HYDROCHLORIDE 9 ML: 10 INJECTION, SOLUTION EPIDURAL; INFILTRATION; INTRACAUDAL; PERINEURAL at 15:01

## 2019-06-11 LAB — COPATH REPORT: NORMAL

## 2019-06-17 ENCOUNTER — MYC MEDICAL ADVICE (OUTPATIENT)
Dept: ENDOCRINOLOGY | Facility: CLINIC | Age: 74
End: 2019-06-17

## 2019-06-21 NOTE — TELEPHONE ENCOUNTER
Patient scheduled for phone visit today around 11am.    Maegan Haro LPN  Diabetes Clinic Coordinator   Adult Endocrinology and Pediatric Specialty Clinics  [facility]

## 2019-06-21 NOTE — TELEPHONE ENCOUNTER
Can you please arrange a phone visit with me for this patient.   Eleanor Zarco MD  Endocrinology Service

## 2019-07-10 ENCOUNTER — TELEPHONE (OUTPATIENT)
Dept: FAMILY MEDICINE | Facility: CLINIC | Age: 74
End: 2019-07-10

## 2019-07-10 NOTE — TELEPHONE ENCOUNTER
Summary:    Patient is due/failing the following:   Medicare annual wellness visit, BP CHECK, BMP and LDL    Action needed:   Patient needs office visit for Annual wellness visit. and Patient needs fasting lab only appointment    Type of outreach:    Phone, spoke to patient.  patient scheduled     Questions for provider review:    Patient wanted to make sure you have seen the results from endocrinology about her thyroid biopsies.                                                                                                                                    Josi Saldana       Chart routed to Provider .      Panel Management Review      Patient has the following on her problem list:     Depression / Dysthymia review    Measure:  Needs PHQ-9 score of 4 or less during index window.  Administer PHQ-9 and if score is 5 or more, send encounter to provider for next steps.        PHQ-9 SCORE 11/15/2016 6/27/2018 7/31/2018   PHQ-9 Total Score - - -   PHQ-9 Total Score MyChart - - -   PHQ-9 Total Score 3 7 2       If PHQ-9 recheck is 5 or more, route to provider for next steps.    Patient is due for:  PHQ9    Hypertension   Last three blood pressure readings:  BP Readings from Last 3 Encounters:   04/26/19 153/81   07/31/18 132/70   06/28/18 144/82     Blood pressure: FAILED    HTN Guidelines:  Less than 140/90      Composite cancer screening  Chart review shows that this patient is due/due soon for the following None

## 2019-08-07 NOTE — PROGRESS NOTES
[facility]  [address], [address]  [place] [address]  [phone]  Dept: [phone]    PRE-OP EVALUATION:  Today's date: 2019    Nicole Slaughter (: 1945) presents for pre-operative evaluation assessment as requested by  ***.  She requires evaluation and anesthesia risk assessment prior to undergoing surgery/procedure for treatment of *** .    {PREOP QUESTIONNAIRE OPTIONS (by MA):133906}    HPI:     HPI related to upcoming procedure: ***      {Ch. Problems:593734}    MEDICAL HISTORY:     Patient Active Problem List    Diagnosis Date Noted     Osteopenia 2017     Priority: Medium     Early menopause 2017     Priority: Medium     Thyroid nodule 2016     Priority: Medium     Depression, major, in remission (H) 2014     Priority: Medium     Advanced directives, counseling/discussion 2013     Priority: Medium     Discussed advance care planning with patient; however, patient declined at this time.          Health Care Home 2012     Priority: Medium     Samira German RN-PHN  FPA / [facility]   [phone]    DX V65.8 REPLACED WITH [facility] (2013)       GERD (gastroesophageal reflux disease) 2011     Priority: Medium     Knee pain 2011     Priority: Medium     Back pain 2011     Priority: Medium     Neck pain 2011     Priority: Medium     Arthritis of hand, degenerative 2011     Priority: Medium     Hypertension goal BP (blood pressure) < 140/90 2010     Priority: Medium     Migraine headache 2010     Priority: Medium     Dr. Megan novoa [facility].  (Problem list name updated by automated process. Provider to review and confirm.)       Hyperlipidemia LDL goal <130 2010     Priority: Medium     Generalized osteoarthrosis, unspecified site 2008     Priority: Medium     Arthropathy 2008     Priority: Medium     Problem list name updated by  automated process. Provider to review        Past Medical History:   Diagnosis Date     Cervicalgia      Depressive disorder      Generalized osteoarthrosis, unspecified site      Migraine, unspecified, without mention of intractable migraine without mention of status migrainosus      Thyroid nodule 12/1/2016     Unspecified essential hypertension      Past Surgical History:   Procedure Laterality Date     HYSTERECTOMY, TANG       ORTHOPEDIC SURGERY       SOFT TISSUE SURGERY       Current Outpatient Medications   Medication Sig Dispense Refill     acyclovir (ZOVIRAX) 400 MG tablet TAKE 1 TABLET (400 MG) BY ORAL ROUTE 2 TIMES PER DAY 60 tablet 4     atorvastatin (LIPITOR) 40 MG tablet TAKE ONE TABLET BY MOUTH TWICE WEEKLY  8 tablet 0     atorvastatin (LIPITOR) 40 MG tablet TAKE 1 TABLET (40 MG) BY MOUTH TWICE A WEEK 10 tablet 6     Black Pepper-Turmeric (TURMERIC COMPLEX/BLACK PEPPER PO) Take by mouth daily Cameron paste       CALCIUM-VITAMIN D PO Take  by mouth daily.       FLUoxetine (PROZAC) 10 MG capsule Take 1 capsule (10 mg) by mouth daily 90 capsule 3     gabapentin (NEURONTIN) 100 MG capsule TAKE ONE CAPSULE BY MOUTH THREE TIMES DAILY 270 capsule 3     hydrochlorothiazide (HYDRODIURIL) 25 MG tablet Take 1 tablet (25 mg) by mouth daily 90 tablet 1     HYDROcodone-acetaminophen (NORCO) 5-325 MG per tablet Take 1 tablet by mouth every 6 hours as needed for severe pain (Patient not taking: Reported on 7/31/2018) 10 tablet 0     MAGNESIUM PO Take by mouth daily       Omega-3 Fatty Acids (OMEGA 3 PO) Reported on 5/19/2017       omeprazole (PRILOSEC) 20 MG DR capsule TAKE ONE CAPSULE BY MOUTH EVERY DAY BEFORE A MEAL 30 capsule 3     Probiotic Product (PROBIOTIC DAILY PO) Take by mouth daily       SUMAtriptan (IMITREX) 50 MG tablet take 1 tablet at onset of headache may repeat in 2 hours as needed max 2 tabs/day 18 tablet 1     tiZANidine (ZANAFLEX) 2 MG tablet Take 1 tablet (2 mg) by mouth 2 times daily as needed for  "muscle spasms (Patient not taking: Reported on 4/26/2019) 90 tablet 0     triamcinolone (KENALOG) 0.1 % cream Apply topically 2 times daily as needed for irritation Apply sparingly to affected area three times daily for 14 days. (Patient not taking: Reported on 4/26/2019) 15 g 0     OTC products: {OTC ANALGESICS:122777}    Allergies   Allergen Reactions     Tylenol W/Codeine [Acetaminophen-Codeine] Itching     codeine     Percocet [Oxycodone-Acetaminophen] Rash     Sulfa Drugs Rash      Latex Allergy: {YES/NO WITH DEFAULT:904213::\"NO\"}    Social History     Tobacco Use     Smoking status: Never Smoker     Smokeless tobacco: Never Used   Substance Use Topics     Alcohol use: Yes     Comment: wine     History   Drug Use No       REVIEW OF SYSTEMS:   {ROS Preop Choices:322055}    EXAM:   There were no vitals taken for this visit.  {EXAM Preop Choices:570603}    DIAGNOSTICS:   {DIAGNOSTIC FOR PREOP:968448}    Recent Labs   Lab Test 07/31/18  1212 05/15/17  0851    141   POTASSIUM 4.5 3.9   CR 0.70 0.77        IMPRESSION:   {PREOP REASONS:955896::\"Reason for surgery/procedure: ***\",\"Diagnosis/reason for consult: ***\"}    The proposed surgical procedure is considered {HIGH=major cardiovascular or procedures requiring prolonged anesthesia >4 hours or large fluid shifts;    INTERMEDIATE=abdominal, most orthopedic and intrathoracic surgery; LOW= endoscopy, cataract and breast surgery:591413} risk.    REVISED CARDIAC RISK INDEX  The patient has the following serious cardiovascular risks for perioperative complications such as (MI, PE, VFib and 3  AV Block):  {PREOP REVISED CARDIAC INDEX (RCI):966356:p:\"No serious cardiac risks\"}  INTERPRETATION: {REVISED CARDIAC RISK INTERPRETATION:729530}    The patient has the following additional risks for perioperative complications:  {Additional perioperative risks:907205:p:\"No identified additional risks\"}      ICD-10-CM    1. Preop general physical exam Z01.818  " "      RECOMMENDATIONS:     {IMPORTANT - Conditions - complete carefully!!:154145}    {IMPORTANT - Medications:875792::\"--Patient is to take all scheduled medications on the day of surgery EXCEPT for modifications listed below.\"}    {IMPORTANT - Approval:831876:p:\"APPROVAL GIVEN to proceed with proposed procedure, without further diagnostic evaluation\"}       Signed Electronically by: JOE GOMEZ MD, MD    Copy of this evaluation report is provided to requesting physician.    [facility] Preop Guidelines    Revised Cardiac Risk Index   SUBJECTIVE:   CC: Nicole Slaughter is an 74 year old woman who presents for preventive health visit.     Healthy Habits:    Do you get at least three servings of calcium containing foods daily (dairy, green leafy vegetables, etc.)? { :765907::\"yes\"}    Amount of exercise or daily activities, outside of work: { :004315}    Problems taking medications regularly { :065524::\"No\"}    Medication side effects: { :958709::\"No\"}    Have you had an eye exam in the past two years? { :061209}    Do you see a dentist twice per year? { :502223}    Do you have sleep apnea, excessive snoring or daytime drowsiness?{ :917487}  {Outside tests to abstract? :026940}    {additional problems to add (Optional):190480}    Today's PHQ-2 Score:   PHQ-2 ( 1999 [employer]) 8/17/2016 3/9/2016   Q1: Little interest or pleasure in doing things 0 1   Q2: Feeling down, depressed or hopeless 0 2   PHQ-2 Score 0 3   Q1: Little interest or pleasure in doing things - -   Q2: Feeling down, depressed or hopeless - -   PHQ-2 Score - -     {PHQ-2 LOOK IN ASSESSMENTS (Optional) :673650}  Abuse: Current or Past(Physical, Sexual or Emotional)- {YES/NO/NA:100482}  Do you feel safe in your environment? {YES/NO/NA:019876}    Social History     Tobacco Use     Smoking status: Never Smoker     Smokeless tobacco: Never Used   Substance Use Topics     Alcohol use: Yes     Comment: wine     If you drink alcohol do you typically have >3 " "drinks per day or >7 drinks per week? {ETOH :694561}                     Reviewed orders with patient.  Reviewed health maintenance and updated orders accordingly - {Yes/No:699069::\"Yes\"}  {Chronicprobdata (Optional):351152}    {Mammo Decision Support (Optional):128459}    Pertinent mammograms are reviewed under the imaging tab.  History of abnormal Pap smear: {PAP HX:630335}     Reviewed and updated as needed this visit by clinical staff         Reviewed and updated as needed this visit by Provider        {HISTORY OPTIONS (Optional):426710}    ROS:  { :238626}    OBJECTIVE:   There were no vitals taken for this visit.  EXAM:  {Exam Choices:095171}    {Diagnostic Test Results (Optional):178417::\"Diagnostic Test Results:\",\"Labs reviewed in Epic\"}    ASSESSMENT/PLAN:   {Diag Picklist:583713}    COUNSELING:   {FEMALE COUNSELING MESSAGES:513298::\"Reviewed preventive health counseling, as reflected in patient instructions\"}    Estimated body mass index is 28.25 kg/m  as calculated from the following:    Height as of 4/27/18: 1.6 m (5' 3\").    Weight as of 4/26/19: 72.3 kg (159 lb 8 oz).    {Weight Management Plan (ACO) Complete if BMI is abnormal-  Ages 18-64  BMI >24.9.  Age 65+ with BMI <23 or >30 (Optional):872563}     reports that she has never smoked. She has never used smokeless tobacco.  {Tobacco Cessation -- Complete if patient is a smoker (Optional):528957}    Counseling Resources:  ATP IV Guidelines  Pooled Cohorts Equation Calculator  Breast Cancer Risk Calculator  FRAX Risk Assessment  ICSI Preventive Guidelines  Dietary Guidelines for Americans, 2010  USDA's MyPlate  ASA Prophylaxis  Lung CA Screening    JOE GOMEZ MD, MD  [facility]  "

## 2019-08-07 NOTE — PATIENT INSTRUCTIONS
Shots:  Get a flu shot each year.  Get a tetanus shot every 10 years.  Talk to your doctor about your pneumonia vaccines. There are now two you should receive - Pneumovax (PPSV 23) and Prevnar (PCV 13).  Talk to your pharmacist about the shingles vaccine.  Talk to your doctor about the hepatitis B vaccine.    Nutrition:   Eat at least 5 servings of fruits and vegetables each day.    Eat whole-grain bread, whole-wheat pasta and brown rice instead of white grains and rice.    Get adequate about Calcium and Vitamin D.     Lifestyle  Exercise at least 150 minutes a week (30 minutes a day, 5 days a week). This will help you control your weight and prevent disease.    Limit alcohol to one drink per day.    No smoking.     Wear sunscreen to prevent skin cancer.     See your dentist twice a year for an exam and cleaning.    See your eye doctor every 1 to 2 years to screen for conditions such as glaucoma, macular degeneration, cataracts, etc.    Personalized Prevention Plan  You are due for the preventive services outlined below.  Your care team is available to assist you in scheduling these services.  If you have already completed any of these items, please share that information with your care team to update in your medical record.    Health Maintenance Due   Topic Date Due     Annual Wellness Visit  03/23/2012     Zoster (Shingles) Vaccine (2 of 3) 12/19/2012     Discuss Advance Care Planning  01/11/2018     Depression Assessment  01/31/2019     FALL RISK ASSESSMENT  06/27/2019     Basic Metabolic Panel  07/31/2019     Cholesterol Lab  07/31/2019     Patient Education   Personalized Prevention Plan  You are due for the preventive services outlined below.  Your care team is available to assist you in scheduling these services.  If you have already completed any of these items, please share that information with your care team to update in your medical record.  Health Maintenance Due   Topic Date Due     Annual Wellness  Visit  03/23/2012     Zoster (Shingles) Vaccine (2 of 3) 12/19/2012     Discuss Advance Care Planning  01/11/2018     Depression Assessment  01/31/2019     FALL RISK ASSESSMENT  06/27/2019     Basic Metabolic Panel  07/31/2019     Cholesterol Lab  07/31/2019        Patient Education   Personalized Prevention Plan  You are due for the preventive services outlined below.  Your care team is available to assist you in scheduling these services.  If you have already completed any of these items, please share that information with your care team to update in your medical record.  Health Maintenance Due   Topic Date Due     Annual Wellness Visit  03/23/2012     Zoster (Shingles) Vaccine (2 of 3) 12/19/2012     Discuss Advance Care Planning  01/11/2018     Depression Assessment  01/31/2019     FALL RISK ASSESSMENT  06/27/2019     Basic Metabolic Panel  07/31/2019     Cholesterol Lab  07/31/2019       Urinary Incontinence, Female (Adult)  Urinary incontinence means loss of control of the bladder. This problem affects many women, especially as they get older. If you have incontinence, you may be embarrassed to ask for help. But know that this problem can be treated.  Types of Incontinence  There are different types of incontinence. Two of the main types are described here. You can have more than one type.  Stress incontinence. With this type, urine leaks when pressure (stress) is put on the bladder. This may happen when you cough, sneeze, or laugh. Stress incontinence most often occurs because the pelvic floor muscles that support the bladder and urethra are weak. This can happen after pregnancy and vaginal childbirth or a hysterectomy. It can also be due to excess body weight or hormone changes.  Urge incontinence (also called overactive bladder). With this type, a sudden urge to urinate is felt often. This may happen even though there may not be much urine in the bladder. The need to urinate often during the night is  common. Urge incontinence most often occurs because of bladder spasms. This may be due to bladder irritation or infection. Damage to bladder nerves or pelvic muscles, constipation, and certain medicines can also lead to urge incontinence.  Treatment of urinary incontinence depends on the cause. Further evaluation is needed to find the type you have. This will likely include an exam and certain tests. Based on the results, you and your healthcare provider can then plan treatment. Until a diagnosis is made, the home care tips below can help relieve symptoms.  Home care  Do pelvic floor muscle exercises, if they are prescribed. The pelvic floor muscles help support the bladder and urethra. Many women find that their symptoms improve when doing special exercises that strengthen these muscles. To do the exercises contract the muscles you would use to stop your stream of urine, but do this when you re not urinating. Hold for 10 seconds, then relax. Repeat 10 to 20 times in a row, at least 3 times a day. Your provider may give you other instructions for how to do the exercises and how often.  Keep a bladder diary. This helps track how often and how much you urinate over a set period of time. Bring this diary with you to your next visit with the provider. The information can help your provider learn more about your bladder problem.  Lose weight, if advised to by your provider. Excess weight puts pressure on the bladder. Your provider can help you create a weight-loss plan that s right for you. This may include exercising more and making certain diet changes.  Don't consume foods and drinks that may irritate the bladder. These can include alcohol and caffeinated drinks.  Quit smoking. Smoking and other tobacco use can lead to chronic cough that strains the pelvic floor muscles. Smoking may also damage the bladder and urethra. Talk with your provider about treatments or methods you can use to quit smoking.  If drinking large  amounts of fluid causes you to have symptoms, you may be advised to limit your fluid intake. You may also be advised to drink most of your fluids during the day and to limit fluids at night.  If you re worried about urine leakage or accidents, you may wear absorbent pads to catch urine. Change the pads often. This helps reduce discomfort. It may also reduce the risk of skin or bladder infections.  Follow-up care  Follow up with your healthcare provider, or as directed. It may take some to find the right treatment for your problem. Your treatment plan may include special therapies or medicines. Certain procedures or surgery may also be options. Be sure to discuss any questions you have with your provider.  When to seek medical advice  Call the healthcare provider right away if any of these occur:  Fever of 100.4 F (38 C) or higher, or as directed by your provider  Bladder pain or fullness  Abdominal swelling  Nausea or vomiting  Back pain  Weakness, dizziness or fainting  Date Last Reviewed: 10/1/2017    9982-6749 [employer]. [address] [place] [address]. All rights reserved. This information is not intended as a substitute for professional medical care. Always follow your healthcare professional's instructions.          Preventing Falls in the Home  An adult or child can fall for many reasons. If you are an older adult, you may fall because your reaction time slows down. Your muscles and joints may get stiff, weak, or less flexible because of illness, medicines, or a physical condition. These things can also make a child more likely to fall or be injured in a fall.  Other health problems that make falls more likely include:  Arthritis  Dizziness or lightheadedness when you get out of bed (orthostatic hypotension)  History of a stroke  Dizziness  Anemia  Certain medicines taken for mental illness  Problems with balance or gait  History of falls with or without an injury  Changes in vision  (vision impairment)  Changes in thinking skills and memory (cognitive impairment)  Injuries from a fall can include broken bones, dislocated joints, and cuts. When these injuries are serious enough, they can make it impossible for you or a child who is injured in a fall to live on his or her own.  Prevention tips  To help prevent falls and fall-related injuries, follow the tips below.   Floors  Make floors safer by doing the following:   Put nonskid pads under area rugs.  Remove throw rugs.  Replace worn floor coverings.  Tack carpets firmly to each step on carpeted stairs. Put nonskid strips on the edges of uncarpeted stairs.  Keep floors and stairs free of clutter and cords.  Arrange furniture so there are clear pathways.  Clean up any spills right away.  Wear shoes that fit.  Bathrooms    Make bathrooms safer by doing the following:   Install grab bars in the tub or shower.  Apply nonskid strips or put a nonskid rubber mat in the tub or shower.  Sit on a bath chair to bathe.  Use bathmats with nonskid backing.  Lighting and the environment  Improve lighting in your home by doing the following:   Keep a flashlight in each room. Or put a lamp next to the bed within easy reach.  Put nightlights in the bedrooms, hallways, kitchen, and bathrooms.  Make sure all stairways have good lighting.  Take your time when going up and down stairs.  Put handrails on both sides of stairs and in walkways for more support. To prevent injury to your wrist or arm, don t use handrails to pull yourself up.  Install grab bars to pull yourself up.  Move or rearrange items that you use often. This will make them easier to find or reach.  Look at your home to find any safety hazards. Especially look at doorways, walkways, and the driveway. Remove or repair any safety problems that you find.  Date Last Reviewed: 8/1/2016 2000-2018 [employer]. [address], [place] [address]. All rights reserved. This information  is not intended as a substitute for professional medical care. Always follow your healthcare professional's instructions.

## 2019-08-13 ENCOUNTER — OFFICE VISIT (OUTPATIENT)
Dept: FAMILY MEDICINE | Facility: CLINIC | Age: 74
End: 2019-08-13
Payer: COMMERCIAL

## 2019-08-13 VITALS
RESPIRATION RATE: 16 BRPM | WEIGHT: 155 LBS | BODY MASS INDEX: 28.52 KG/M2 | DIASTOLIC BLOOD PRESSURE: 72 MMHG | OXYGEN SATURATION: 95 % | HEART RATE: 74 BPM | SYSTOLIC BLOOD PRESSURE: 102 MMHG | HEIGHT: 62 IN | TEMPERATURE: 97.4 F

## 2019-08-13 DIAGNOSIS — I10 HYPERTENSION GOAL BP (BLOOD PRESSURE) < 140/90: ICD-10-CM

## 2019-08-13 DIAGNOSIS — B00.9 HERPES SIMPLEX VIRUS INFECTION: ICD-10-CM

## 2019-08-13 DIAGNOSIS — F32.5 DEPRESSION, MAJOR, IN REMISSION (H): ICD-10-CM

## 2019-08-13 DIAGNOSIS — E78.5 HYPERLIPIDEMIA LDL GOAL <130: ICD-10-CM

## 2019-08-13 DIAGNOSIS — Z00.00 ENCOUNTER FOR MEDICARE ANNUAL WELLNESS EXAM: Primary | ICD-10-CM

## 2019-08-13 DIAGNOSIS — G43.009 MIGRAINE WITHOUT AURA AND WITHOUT STATUS MIGRAINOSUS, NOT INTRACTABLE: ICD-10-CM

## 2019-08-13 DIAGNOSIS — Z79.899 ENCOUNTER FOR LONG-TERM (CURRENT) USE OF MEDICATIONS: ICD-10-CM

## 2019-08-13 LAB
ALBUMIN SERPL-MCNC: 3.7 G/DL (ref 3.4–5)
ALP SERPL-CCNC: 69 U/L (ref 40–150)
ALT SERPL W P-5'-P-CCNC: 22 U/L (ref 0–50)
ANION GAP SERPL CALCULATED.3IONS-SCNC: 4 MMOL/L (ref 3–14)
AST SERPL W P-5'-P-CCNC: 16 U/L (ref 0–45)
BASOPHILS # BLD AUTO: 0.1 10E9/L (ref 0–0.2)
BASOPHILS NFR BLD AUTO: 1 %
BILIRUB SERPL-MCNC: 0.6 MG/DL (ref 0.2–1.3)
BUN SERPL-MCNC: 18 MG/DL (ref 7–30)
CALCIUM SERPL-MCNC: 9.2 MG/DL (ref 8.5–10.1)
CHLORIDE SERPL-SCNC: 103 MMOL/L (ref 94–109)
CHOLEST SERPL-MCNC: 215 MG/DL
CO2 SERPL-SCNC: 32 MMOL/L (ref 20–32)
CREAT SERPL-MCNC: 0.65 MG/DL (ref 0.52–1.04)
DIFFERENTIAL METHOD BLD: NORMAL
EOSINOPHIL # BLD AUTO: 0.2 10E9/L (ref 0–0.7)
EOSINOPHIL NFR BLD AUTO: 3.4 %
ERYTHROCYTE [DISTWIDTH] IN BLOOD BY AUTOMATED COUNT: 13.8 % (ref 10–15)
GFR SERPL CREATININE-BSD FRML MDRD: 88 ML/MIN/{1.73_M2}
GLUCOSE SERPL-MCNC: 88 MG/DL (ref 70–99)
HCT VFR BLD AUTO: 38.3 % (ref 35–47)
HDLC SERPL-MCNC: 78 MG/DL
HGB BLD-MCNC: 12.6 G/DL (ref 11.7–15.7)
LDLC SERPL CALC-MCNC: 122 MG/DL
LYMPHOCYTES # BLD AUTO: 0.8 10E9/L (ref 0.8–5.3)
LYMPHOCYTES NFR BLD AUTO: 16.6 %
MCH RBC QN AUTO: 30.4 PG (ref 26.5–33)
MCHC RBC AUTO-ENTMCNC: 32.9 G/DL (ref 31.5–36.5)
MCV RBC AUTO: 92 FL (ref 78–100)
MONOCYTES # BLD AUTO: 0.5 10E9/L (ref 0–1.3)
MONOCYTES NFR BLD AUTO: 10.3 %
NEUTROPHILS # BLD AUTO: 3.5 10E9/L (ref 1.6–8.3)
NEUTROPHILS NFR BLD AUTO: 68.7 %
NONHDLC SERPL-MCNC: 137 MG/DL
PLATELET # BLD AUTO: 226 10E9/L (ref 150–450)
POTASSIUM SERPL-SCNC: 3.9 MMOL/L (ref 3.4–5.3)
PROT SERPL-MCNC: 7.2 G/DL (ref 6.8–8.8)
RBC # BLD AUTO: 4.15 10E12/L (ref 3.8–5.2)
SODIUM SERPL-SCNC: 139 MMOL/L (ref 133–144)
TRIGL SERPL-MCNC: 76 MG/DL
WBC # BLD AUTO: 5.1 10E9/L (ref 4–11)

## 2019-08-13 PROCEDURE — 99214 OFFICE O/P EST MOD 30 MIN: CPT | Mod: 25 | Performed by: FAMILY MEDICINE

## 2019-08-13 PROCEDURE — 99207 C PAF COMPLETED  NO CHARGE: CPT | Performed by: FAMILY MEDICINE

## 2019-08-13 PROCEDURE — 80053 COMPREHEN METABOLIC PANEL: CPT | Performed by: FAMILY MEDICINE

## 2019-08-13 PROCEDURE — 85025 COMPLETE CBC W/AUTO DIFF WBC: CPT | Performed by: FAMILY MEDICINE

## 2019-08-13 PROCEDURE — G0439 PPPS, SUBSEQ VISIT: HCPCS | Performed by: FAMILY MEDICINE

## 2019-08-13 PROCEDURE — 36415 COLL VENOUS BLD VENIPUNCTURE: CPT | Performed by: FAMILY MEDICINE

## 2019-08-13 PROCEDURE — 80061 LIPID PANEL: CPT | Performed by: FAMILY MEDICINE

## 2019-08-13 RX ORDER — CHLORTHALIDONE 25 MG/1
12.5 TABLET ORAL DAILY
Qty: 45 TABLET | Refills: 1 | Status: SHIPPED | OUTPATIENT
Start: 2019-08-13 | End: 2020-01-13

## 2019-08-13 RX ORDER — ACYCLOVIR 400 MG/1
TABLET ORAL
Qty: 60 TABLET | Refills: 6 | Status: SHIPPED | OUTPATIENT
Start: 2019-08-13 | End: 2020-09-01

## 2019-08-13 RX ORDER — SUMATRIPTAN 50 MG/1
TABLET, FILM COATED ORAL
Qty: 18 TABLET | Refills: 1 | Status: SHIPPED | OUTPATIENT
Start: 2019-08-13 | End: 2020-03-25

## 2019-08-13 RX ORDER — GABAPENTIN 100 MG/1
CAPSULE ORAL
Qty: 270 CAPSULE | Refills: 3 | Status: SHIPPED | OUTPATIENT
Start: 2019-08-13 | End: 2020-10-20

## 2019-08-13 RX ORDER — ATORVASTATIN CALCIUM 40 MG/1
40 TABLET, FILM COATED ORAL
Qty: 8 TABLET | Refills: 11 | Status: SHIPPED | OUTPATIENT
Start: 2019-08-15 | End: 2020-07-01

## 2019-08-13 ASSESSMENT — ACTIVITIES OF DAILY LIVING (ADL): CURRENT_FUNCTION: NO ASSISTANCE NEEDED

## 2019-08-13 ASSESSMENT — PATIENT HEALTH QUESTIONNAIRE - PHQ9: SUM OF ALL RESPONSES TO PHQ QUESTIONS 1-9: 4

## 2019-08-13 ASSESSMENT — MIFFLIN-ST. JEOR: SCORE: 1170.3

## 2019-08-13 NOTE — PROGRESS NOTES
SUBJECTIVE:   Nicole Slaughter is a 74 year old female who presents for Preventive Visit.    Are you in the first 12 months of your Medicare coverage?  No    Healthy Habits:    In general, how would you rate your overall health?  Good    Frequency of exercise:  6-7 days/week    Duration of exercise:  Greater than 60 minutes    Taking medications regularly:  Yes    Medication side effects:: ringing in ears     Ability to successfully perform activities of daily living:  No assistance needed    Home Safety:  No safety concerns identified    Hearing impairment symptoms: Diffuclty hearing in left ear     In the past 6 months, have you been bothered by leaking of urine? Yes    In general, how would you rate your overall mental or emotional health?  Good      PHQ-2 Total Score:    Additional concerns today:  No    Do you feel safe in your environment? Yes    Do you have a Health Care Directive? No: Advance care planning reviewed with patient; information given to patient to review.      Fall risk  Fallen 2 or more times in the past year?: No  Any fall with injury in the past year?: Yes    Cognitive Screening   1) Repeat 3 items (Leader, Season, Table)    2) Clock draw: NORMAL  3) 3 item recall: Recalls 3 objects  Results: 3 items recalled: COGNITIVE IMPAIRMENT LESS LIKELY    Mini-CogTM Copyright S [place]. Licensed by the author for use in [facility]; reprinted with permission (sobouchra@.[facility]). All rights reserved.      Do you have sleep apnea, excessive snoring or daytime drowsiness?: yes , daytime drowsiness.     Reviewed and updated as needed this visit by clinical staff  Tobacco  Allergies  Meds  Med Hx  Surg Hx  Fam Hx  Soc Hx        Reviewed and updated as needed this visit by Provider        Social History     Tobacco Use     Smoking status: Never Smoker     Smokeless tobacco: Never Used   Substance Use Topics     Alcohol use: Yes     Comment: 5 drinks per week / wine               Hyperlipidemia  Follow-Up      Are you having any of the following symptoms? (Select all that apply)  No complaints of shortness of breath, chest pain or pressure.  No increased sweating or nausea with activity.  No left-sided neck or arm pain.  No complaints of pain in calves when walking 1-2 blocks.    Are you regularly taking any medication or supplement to lower your cholesterol?   Yes- Lipitor    Are you having muscle aches or other side effects that you think could be caused by your cholesterol lowering medication?  No      Hypertension Follow-up      Do you check your blood pressure regularly outside of the clinic? No     Are you following a low salt diet? Yes    Are your blood pressures ever more than 140 on the top number (systolic) OR more   than 90 on the bottom number (diastolic), for example 140/90? No   She would like to switch from hydrochlorothiazide to chlorthalidone since she read it could cause tinnitus  She would like to try and see if this helps.     Depression Followup    How are you doing with your depression since your last visit? No change    Are you having other symptoms that might be associated with depression? No    Have you had a significant life event?  OTHER: concerns about 's memory issues.      Are you feeling anxious or having panic attacks?   No    Do you have any concerns with your use of alcohol or other drugs? No    Social History     Tobacco Use     Smoking status: Never Smoker     Smokeless tobacco: Never Used   Substance Use Topics     Alcohol use: Yes     Comment: 5 drinks per week / wine      Drug use: No     PHQ 6/27/2018 7/31/2018 8/13/2019   PHQ-9 Total Score 7 2 4   Q9: Thoughts of better off dead/self-harm past 2 weeks Not at all Not at all Not at all     ASHWIN-7 SCORE 11/15/2016 6/27/2018 7/31/2018   Total Score - - -   Total Score 5 5 2           Suicide Assessment Five-step Evaluation and Treatment (SAFE-T)  Migraine     Since your last clinic visit, how have your headaches  changed?  No change    How often are you getting headaches or migraines? Once per month     Are you able to do normal daily activities when you have a migraine? No    Are you taking rescue/relief medications? (Select all that apply) Tylenol and sumatriptan (Imitrex)    How helpful is your rescue/relief medication?  I get total relief    Are you taking any medications to prevent migraines? (Select all that apply)  Neurontin    In the past 4 weeks, how often have you gone to urgent care or the emergency room because of your headaches?  0    HERPES SIMPLEX  Continues to take acyclovir regularly  She has not issues or concerns.   Would like refills given.       Current providers sharing in care for this patient include:   Patient Care Team:  Lizett Parikh MD as PCP - General (Family Practice)  Lizett Parikh MD as Assigned PCP    The following health maintenance items are reviewed in Epic and correct as of today:  Health Maintenance   Topic Date Due     MEDICARE ANNUAL WELLNESS VISIT  03/23/2012     ZOSTER IMMUNIZATION (2 of 3) 12/19/2012     ADVANCE CARE PLANNING  01/11/2018     PHQ-9  01/31/2019     FALL RISK ASSESSMENT  06/27/2019     BMP  07/31/2019     LIPID  07/31/2019     INFLUENZA VACCINE (1) 09/01/2019     MAMMO SCREENING  04/16/2020     COLONOSCOPY  01/07/2021     DTAP/TDAP/TD IMMUNIZATION (4 - Td) 07/11/2023     DEXA  06/04/2024     HEPATITIS C SCREENING  Completed     DEPRESSION ACTION PLAN  Completed     MIGRAINE ACTION PLAN  Completed     IPV IMMUNIZATION  Aged Out     MENINGITIS IMMUNIZATION  Aged Out     BP Readings from Last 3 Encounters:   08/13/19 102/72   04/26/19 153/81   07/31/18 132/70    Wt Readings from Last 3 Encounters:   08/13/19 70.3 kg (155 lb)   04/26/19 72.3 kg (159 lb 8 oz)   07/31/18 68.5 kg (151 lb)                  Patient Active Problem List   Diagnosis     Generalized osteoarthrosis, unspecified site     Arthropathy     Hypertension goal BP (blood pressure) < 140/90      Migraine headache     Hyperlipidemia LDL goal <130     Knee pain     Back pain     Neck pain     Arthritis of hand, degenerative     GERD (gastroesophageal reflux disease)     Health Care Home     Advanced directives, counseling/discussion     Depression, major, in remission (H)     Thyroid nodule     Osteopenia     Early menopause     Past Surgical History:   Procedure Laterality Date     HYSTERECTOMY, TANG       ORTHOPEDIC SURGERY       SOFT TISSUE SURGERY         Social History     Tobacco Use     Smoking status: Never Smoker     Smokeless tobacco: Never Used   Substance Use Topics     Alcohol use: Yes     Comment: 5 drinks per week / wine      Family History   Problem Relation Age of Onset     Arthritis Mother      Lipids Mother      Hypertension Mother      Breast Cancer Mother      Anesthesia Reaction Mother      Heart Disease Father      Cerebrovascular Disease Father      Arthritis Maternal Grandmother      Breast Cancer Maternal Aunt      Breast Cancer Maternal Aunt      Diabetes No family hx of      Coronary Artery Disease No family hx of      Hyperlipidemia No family hx of      Prostate Cancer No family hx of      Other Cancer No family hx of      Anxiety Disorder No family hx of      Mental Illness No family hx of      Substance Abuse No family hx of          Current Outpatient Medications   Medication Sig Dispense Refill     acyclovir (ZOVIRAX) 400 MG tablet TAKE 1 TABLET (400 MG) BY ORAL ROUTE 2 TIMES PER DAY 60 tablet 4     atorvastatin (LIPITOR) 40 MG tablet TAKE ONE TABLET BY MOUTH TWICE WEEKLY  8 tablet 0     atorvastatin (LIPITOR) 40 MG tablet TAKE 1 TABLET (40 MG) BY MOUTH TWICE A WEEK 10 tablet 6     Black Pepper-Turmeric (TURMERIC COMPLEX/BLACK PEPPER PO) Take by mouth daily Cameron paste       CALCIUM-VITAMIN D PO Take  by mouth daily.       FLUoxetine (PROZAC) 10 MG capsule Take 1 capsule (10 mg) by mouth daily 90 capsule 3     gabapentin (NEURONTIN) 100 MG capsule TAKE ONE CAPSULE BY MOUTH  THREE TIMES DAILY 270 capsule 3     hydrochlorothiazide (HYDRODIURIL) 25 MG tablet Take 1 tablet (25 mg) by mouth daily 90 tablet 1     HYDROcodone-acetaminophen (NORCO) 5-325 MG per tablet Take 1 tablet by mouth every 6 hours as needed for severe pain 10 tablet 0     MAGNESIUM PO Take by mouth daily       Omega-3 Fatty Acids (OMEGA 3 PO) Reported on 5/19/2017       omeprazole (PRILOSEC) 20 MG DR capsule TAKE ONE CAPSULE BY MOUTH EVERY DAY BEFORE A MEAL 30 capsule 3     Probiotic Product (PROBIOTIC DAILY PO) Take by mouth daily       SUMAtriptan (IMITREX) 50 MG tablet take 1 tablet at onset of headache may repeat in 2 hours as needed max 2 tabs/day 18 tablet 1     tiZANidine (ZANAFLEX) 2 MG tablet Take 1 tablet (2 mg) by mouth 2 times daily as needed for muscle spasms 90 tablet 0     triamcinolone (KENALOG) 0.1 % cream Apply topically 2 times daily as needed for irritation Apply sparingly to affected area three times daily for 14 days. 15 g 0     Allergies   Allergen Reactions     Tylenol W/Codeine [Acetaminophen-Codeine] Itching     codeine     Percocet [Oxycodone-Acetaminophen] Rash     Sulfa Drugs Rash     Recent Labs   Lab Test 04/26/19  1154 07/31/18  1212 04/27/18  1208 05/15/17  0851  04/29/16  0923 03/24/15  0852   LDL  --  94  --  129*  --  113* 109   HDL  --  59  --  80  --  60 67   TRIG  --  73  --  81  --  112 134   ALT  --  26  --  30  --   --  27   CR  --  0.70  --  0.77  --  0.67  --    GFRESTIMATED  --  82  --  74  --  87  --    GFRESTBLACK  --  >90  --  89  --  >90   GFR Calc    --    POTASSIUM  --  4.5  --  3.9  --  3.6  --    TSH 1.74  --  1.78  --    < >  --   --     < > = values in this interval not displayed.       Review of Systems  CONSTITUTIONAL: NEGATIVE for fever, chills, change in weight  INTEGUMENTARY/SKIN: NEGATIVE for worrisome rashes, moles or lesions  EYES: NEGATIVE for vision changes or irritation  ENT/MOUTH: NEGATIVE for ear, mouth and throat problems  RESP:  "NEGATIVE for significant cough or SOB  CV: NEGATIVE for chest pain, palpitations or peripheral edema  GI: NEGATIVE for nausea, abdominal pain, heartburn, or change in bowel habits  : NEGATIVE for frequency, dysuria, or hematuria  MUSCULOSKELETAL: NEGATIVE for significant arthralgias or myalgia  NEURO: NEGATIVE for weakness, dizziness or paresthesias  ENDOCRINE: NEGATIVE for temperature intolerance, skin/hair changes  HEME: NEGATIVE for bleeding problems  PSYCHIATRIC: NEGATIVE for changes in mood or affect    OBJECTIVE:   Pulse 74   Temp 97.4  F (36.3  C) (Temporal)   Resp 16   Ht 1.581 m (5' 2.25\")   Wt 70.3 kg (155 lb)   SpO2 95%   Breastfeeding? No   BMI 28.12 kg/m   Estimated body mass index is 28.12 kg/m  as calculated from the following:    Height as of this encounter: 1.581 m (5' 2.25\").    Weight as of this encounter: 70.3 kg (155 lb).  Physical Exam  GENERAL APPEARANCE: healthy, alert and no distress  EYES: Eyes grossly normal to inspection, PERRL and conjunctivae and sclerae normal  HENT: ear canals and TM's normal, nose and mouth without ulcers or lesions, oropharynx clear and oral mucous membranes moist  NECK: no adenopathy, no asymmetry, masses, or scars and thyroid normal to palpation  RESP: lungs clear to auscultation - no rales, rhonchi or wheezes  BREAST: declined  CV: regular rate and rhythm, normal S1 S2, no S3 or S4, no murmur, click or rub, no peripheral edema and peripheral pulses strong  ABDOMEN: soft, nontender, no hepatosplenomegaly, no masses and bowel sounds normal  MS: no musculoskeletal defects are noted and gait is age appropriate without ataxia  SKIN: no suspicious lesions or rashes  NEURO: Normal strength and tone, sensory exam grossly normal, mentation intact and speech normal  PSYCH: mentation appears normal and affect normal/bright    Diagnostic Test Results:  pending    ASSESSMENT / PLAN:   1. Encounter for Medicare annual wellness exam  See counseling messages   - PAF " COMPLETED  - CBC with platelets and differential    2. Migraine without aura and without status migrainosus, not intractable  Overall well controlled.   Doing well. Well controlled. Tolerating medication.  No change in plan.    - gabapentin (NEURONTIN) 100 MG capsule; TAKE ONE CAPSULE BY MOUTH THREE TIMES DAILY  Dispense: 270 capsule; Refill: 3  - SUMAtriptan (IMITREX) 50 MG tablet; Take one tablet at onset of headache. May repeat in 2 hours as needed. Max 2/day  Dispense: 18 tablet; Refill: 1    3. Depression, major, in remission (H)  Doing well. Well controlled.   No change in plan.      4. Hypertension goal BP (blood pressure) < 140/90  She requested change from hydrochlorothiazide to chlorthalidone   New prescription sent.   Recheck blood pressure in 2 weeks with float visit.   - chlorthalidone (HYGROTON) 25 MG tablet; Take 0.5 tablets (12.5 mg) by mouth daily  Dispense: 45 tablet; Refill: 1    5. Hyperlipidemia LDL goal <130  Doing well. Well controlled. Tolerating medication.  No change in plan.    - Lipid panel reflex to direct LDL Fasting  - atorvastatin (LIPITOR) 40 MG tablet; Take 1 tablet (40 mg) by mouth twice a week  Dispense: 8 tablet; Refill: 11    6. Herpes simplex virus infection  Doing well. Well controlled. Tolerating medication.  No change in plan.    - acyclovir (ZOVIRAX) 400 MG tablet; TAKE 1 TABLET (400 MG) BY ORAL ROUTE 2 TIMES PER DAY  Dispense: 60 tablet; Refill: 6    7. Encounter for long-term (current) use of medications  Will notify of results.   - Comprehensive metabolic panel (BMP + Alb, Alk Phos, ALT, AST, Total. Bili, TP)    End of Life Planning:  Patient currently has an advanced directive: Yes.  Practitioner is supportive of decision.    COUNSELING:  Reviewed preventive health counseling, as reflected in patient instructions       Regular exercise       Healthy diet/nutrition       Dental care    Estimated body mass index is 28.12 kg/m  as calculated from the following:    Height  "as of this encounter: 1.581 m (5' 2.25\").    Weight as of this encounter: 70.3 kg (155 lb).         reports that she has never smoked. She has never used smokeless tobacco.      Appropriate preventive services were discussed with this patient, including applicable screening as appropriate for cardiovascular disease, diabetes, osteopenia/osteoporosis, and glaucoma.  As appropriate for age/gender, discussed screening for colorectal cancer, prostate cancer, breast cancer, and cervical cancer. Checklist reviewing preventive services available has been given to the patient.    Reviewed patients plan of care and provided an AVS. The Basic Care Plan (routine screening as documented in Health Maintenance) for Nicole meets the Care Plan requirement. This Care Plan has been established and reviewed with the Patient.    Counseling Resources:  ATP IV Guidelines  Pooled Cohorts Equation Calculator  Breast Cancer Risk Calculator  FRAX Risk Assessment  ICSI Preventive Guidelines  Dietary Guidelines for Americans, 2010  USDA's MyPlate  ASA Prophylaxis  Lung CA Screening    The information in this document, created by the medical scribe for me, accurately reflects the services I personally performed and the decisions made by me. I have reviewed and approved this document for accuracy prior to leaving the patient care area.    JOE GOMEZ MD, MD  [facility]    Identified Health Risks:    Information on urinary incontinence and treatment options given to patient.  She is at risk for falling and has been provided with information to reduce the risk of falling at home.  "

## 2019-08-14 ENCOUNTER — TRANSFERRED RECORDS (OUTPATIENT)
Dept: HEALTH INFORMATION MANAGEMENT | Facility: CLINIC | Age: 74
End: 2019-08-14

## 2019-08-15 DIAGNOSIS — F32.5 DEPRESSION, MAJOR, IN REMISSION (H): ICD-10-CM

## 2019-08-16 RX ORDER — FLUOXETINE 10 MG/1
10 CAPSULE ORAL DAILY
Qty: 90 CAPSULE | Refills: 1 | Status: SHIPPED | OUTPATIENT
Start: 2019-08-16 | End: 2020-02-05

## 2019-08-16 NOTE — TELEPHONE ENCOUNTER
Prescription approved per [facility] Refill Protocol.  Nolberto Rand, RN, BSN

## 2019-09-24 ENCOUNTER — ALLIED HEALTH/NURSE VISIT (OUTPATIENT)
Dept: FAMILY MEDICINE | Facility: CLINIC | Age: 74
End: 2019-09-24
Payer: COMMERCIAL

## 2019-09-24 VITALS — SYSTOLIC BLOOD PRESSURE: 132 MMHG | DIASTOLIC BLOOD PRESSURE: 76 MMHG

## 2019-09-24 DIAGNOSIS — I10 HTN (HYPERTENSION): Primary | ICD-10-CM

## 2019-09-24 PROCEDURE — 99207 ZZC NO CHARGE NURSE ONLY: CPT

## 2019-10-01 ENCOUNTER — HEALTH MAINTENANCE LETTER (OUTPATIENT)
Age: 74
End: 2019-10-01

## 2019-11-18 ENCOUNTER — TRANSFERRED RECORDS (OUTPATIENT)
Dept: HEALTH INFORMATION MANAGEMENT | Facility: CLINIC | Age: 74
End: 2019-11-18

## 2020-01-13 ENCOUNTER — MYC REFILL (OUTPATIENT)
Dept: FAMILY MEDICINE | Facility: CLINIC | Age: 75
End: 2020-01-13

## 2020-01-13 DIAGNOSIS — I10 HYPERTENSION GOAL BP (BLOOD PRESSURE) < 140/90: ICD-10-CM

## 2020-01-15 RX ORDER — CHLORTHALIDONE 25 MG/1
12.5 TABLET ORAL DAILY
Qty: 45 TABLET | Refills: 1 | Status: SHIPPED | OUTPATIENT
Start: 2020-01-15 | End: 2020-06-22

## 2020-01-15 NOTE — TELEPHONE ENCOUNTER
Pending Prescriptions:                       Disp   Refills    chlorthalidone (HYGROTON) 25 MG tablet    45 tab*1            Sig: Take 0.5 tablets (12.5 mg) by mouth daily      Prescription approved per [facility] [place] Protocol.    Suki Camargo, RN, BSN

## 2020-02-03 DIAGNOSIS — F32.5 DEPRESSION, MAJOR, IN REMISSION (H): ICD-10-CM

## 2020-02-05 RX ORDER — FLUOXETINE 10 MG/1
10 CAPSULE ORAL DAILY
Qty: 90 CAPSULE | Refills: 0 | Status: SHIPPED | OUTPATIENT
Start: 2020-02-05 | End: 2020-07-01 | Stop reason: DRUGHIGH

## 2020-02-05 NOTE — TELEPHONE ENCOUNTER
Pending Prescriptions:                       Disp   Refills    FLUoxetine (PROZAC) 10 MG capsule [Pharma*90 cap*0            Sig: Take 1 capsule (10 mg) by mouth daily    Medication is being filled for 1 time refill only due to:  Patient needs to be seen because mood follow up with PHQ9.       Tejal Flores, SOHAILN, RN, PHN

## 2020-03-24 DIAGNOSIS — G43.009 MIGRAINE WITHOUT AURA AND WITHOUT STATUS MIGRAINOSUS, NOT INTRACTABLE: ICD-10-CM

## 2020-03-25 RX ORDER — SUMATRIPTAN 50 MG/1
TABLET, FILM COATED ORAL
Qty: 18 TABLET | Refills: 0 | Status: SHIPPED | OUTPATIENT
Start: 2020-03-25 | End: 2020-07-01

## 2020-04-24 DIAGNOSIS — K21.9 GASTROESOPHAGEAL REFLUX DISEASE, ESOPHAGITIS PRESENCE NOT SPECIFIED: ICD-10-CM

## 2020-04-24 DIAGNOSIS — F32.5 DEPRESSION, MAJOR, IN REMISSION (H): ICD-10-CM

## 2020-04-24 NOTE — PROGRESS NOTES
"Nicole Slaughter is a 75 year old female who is being evaluated via a billable telephone visit.      The patient has been notified of following:     \"This telephone visit will be conducted via a call between you and your physician/provider. We have found that certain health care needs can be provided without the need for a physical exam.  This service lets us provide the care you need with a short phone conversation.  If a prescription is necessary we can send it directly to your pharmacy.  If lab work is needed we can place an order for that and you can then stop by our lab to have the test done at a later time.    Telephone visits are billed at different rates depending on your insurance coverage. During this emergency period, for some insurers they may be billed the same as an in-person visit.  Please reach out to your insurance provider with any questions.    If during the course of the call the physician/provider feels a telephone visit is not appropriate, you will not be charged for this service.\"    Patient has given verbal consent for Telephone visit?  Yes    How would you like to obtain your AVS? MyChart    Subjective     Nicole Slaughter is a 75 year old female who presents to clinic today for the following health issues:    HPI     Depression and Anxiety Follow-Up    How are you doing with your depression since your last visit? Worsened     How are you doing with your anxiety since your last visit?  Worsened    Are you having other symptoms that might be associated with depression or anxiety? No    Have you had a significant life event? OTHER: Daughter tried to commit suicide in October 2019- stress     Do you have any concerns with your use of alcohol or other drugs? No     Grandson with recent golf cart accident 2 weeks ago.     Having loose stools intermittently since Oct. Stress. Goes away completely.   Taking Pepto-bismol tablets.   Stools/day- on average 3-4 generally in am.  Goes 2-3 times in am " "and once in PM.   Urgency- yes.   This started again in Jan./Feb.- and is current.   Skin around rectum- mild soreness.   No fevers, abdominal pain or bloody stools.     HX-  Normal schedule is once/day- formed.       Nutrition- breakfast- coffee and toast.   Lunch- left overs, sandwich.   Snacks- no sugary drinks, is snaking on sweets- doughnuts/cookies small amount a couple times/day. Milk/dairy, cream in coffee. Greek yogurt.   Dinners-\"regular\". Hot dish, hamburger, potatoes, salad.  Fruits/vegetables- salad or vegetable at night.     Tried metamucil/fiber?    Exercise- minimal with walking dog in yard.       Covid isolation- wanting to really sleep a lot.   Retired.   Has a dog.   Appetite.   Low energy.   Avoiding cleaning.   Has not done spring cares.       Social History     Tobacco Use     Smoking status: Never Smoker     Smokeless tobacco: Never Used   Substance Use Topics     Alcohol use: Yes     Comment: 5 drinks per week / wine      Drug use: No     PHQ 7/31/2018 8/13/2019 4/27/2020   PHQ-9 Total Score 2 4 8   Q9: Thoughts of better off dead/self-harm past 2 weeks Not at all Not at all Not at all     ASHWIN-7 SCORE 6/27/2018 7/31/2018 4/27/2020   Total Score - - -   Total Score 5 2 7     Last PHQ-9 4/27/2020   1.  Little interest or pleasure in doing things 2   2.  Feeling down, depressed, or hopeless 1   3.  Trouble falling or staying asleep, or sleeping too much 1   4.  Feeling tired or having little energy 2   5.  Poor appetite or overeating 0   6.  Feeling bad about yourself 0   7.  Trouble concentrating 1   8.  Moving slowly or restless 1   Q9: Thoughts of better off dead/self-harm past 2 weeks 0   PHQ-9 Total Score 8   Difficulty at work, home, or with people Somewhat difficult     ASHWIN-7  4/27/2020   1. Feeling nervous, anxious, or on edge 1   2. Not being able to stop or control worrying 1   3. Worrying too much about different things 1   4. Trouble relaxing 1   5. Being so restless that it is hard " to sit still 1   6. Becoming easily annoyed or irritable 1   7. Feeling afraid, as if something awful might happen 1   ASHWIN-7 Total Score 7   If you checked any problems, how difficult have they made it for you to do your work, take care of things at home, or get along with other people? Somewhat difficult         Suicide Assessment Five-step Evaluation and Treatment (SAFE-T)            Patient Active Problem List   Diagnosis     Generalized osteoarthrosis, unspecified site     Arthropathy     Hypertension goal BP (blood pressure) < 140/90     Migraine headache     Hyperlipidemia LDL goal <130     Knee pain     Back pain     Neck pain     Arthritis of hand, degenerative     GERD (gastroesophageal reflux disease)     Health Care Home     Advanced directives, counseling/discussion     Depression, major, in remission (H)     Thyroid nodule     Osteopenia     Early menopause     Past Surgical History:   Procedure Laterality Date     HYSTERECTOMY, TANG       ORTHOPEDIC SURGERY       SOFT TISSUE SURGERY         Social History     Tobacco Use     Smoking status: Never Smoker     Smokeless tobacco: Never Used   Substance Use Topics     Alcohol use: Yes     Comment: 5 drinks per week / wine      Family History   Problem Relation Age of Onset     Arthritis Mother      Lipids Mother      Hypertension Mother      Breast Cancer Mother      Anesthesia Reaction Mother      Heart Disease Father      Cerebrovascular Disease Father      Arthritis Maternal Grandmother      Breast Cancer Maternal Aunt      Breast Cancer Maternal Aunt      Diabetes No family hx of      Coronary Artery Disease No family hx of      Hyperlipidemia No family hx of      Prostate Cancer No family hx of      Other Cancer No family hx of      Anxiety Disorder No family hx of      Mental Illness No family hx of      Substance Abuse No family hx of            Reviewed and updated as needed this visit by Provider  Tobacco  Allergies  Meds  Problems  Med Hx   Surg Hx  Fam Hx         Review of Systems   ROS COMP: Constitutional, HEENT, cardiovascular, pulmonary, gi and gu systems are negative, except as otherwise noted.       Objective   Reported vitals:  There were no vitals taken for this visit.   healthy, alert and no distress  PSYCH: Alert and oriented times 3; coherent speech, normal   rate and volume, able to articulate logical thoughts, able   to abstract reason, no tangential thoughts, no hallucinations   or delusions  Her affect is normal and pleasant  RESP: No cough, no audible wheezing, able to talk in full sentences  Remainder of exam unable to be completed due to telephone visits    Diagnostic Test Results:  none         Assessment/Plan:  1. Depression, major, in remission (H)  worsening  - FLUoxetine (PROZAC) 20 MG capsule; Take 1 capsule (20 mg) by mouth daily  Dispense: 90 capsule; Refill: 0    2. Loose stools  Multiple potential reasons. Combination mood, nutrition, vs. Other.   Low suspicion for infection. See below. Consider colonoscopy if not improving.       Patient Instructions   Start getting outside daily- for an hour if able. Gradually begin activity/exercise for overall health.     Start small cleaning projects at home to keep maintaining up as best as you can.     Start increasing fiber in your diet gradually.     You can add Metamucil or similar, start with 1/2 dose daily and gradually increase if able to increase bulk of stools.     Eat healthy if you can.    Increase your Prozac to 20 mg daily. I sent over a new RX.     We can consider telephone counseling if you would like to try this.     Re-check in 3-4 weeks with another virtual visit for a mood and stool check in.            Return in about 3 weeks (around 5/18/2020) for Virtual Visit/Telephone Visit.      Phone call duration:  14.5 minutes    REYMUNDO Damon CNP

## 2020-04-24 NOTE — TELEPHONE ENCOUNTER
Pending Prescriptions:                       Disp   Refills    omeprazole (PRILOSEC) 20 MG DR capsule [P*30 cap*0            Sig: TAKE ONE CAPSULE BY MOUTH EVERY DAY BEFORE A MEAL    FLUoxetine (PROZAC) 10 MG capsule [Pharma*90 cap*0            Sig: TAKE ONE CAPSULE BY MOUTH ONE TIME DAILY     LOV 8/13/2019, due for a visit. Please help schedule    MÓNICA Lopez, RN  r

## 2020-04-27 ENCOUNTER — VIRTUAL VISIT (OUTPATIENT)
Dept: FAMILY MEDICINE | Facility: OTHER | Age: 75
End: 2020-04-27
Payer: COMMERCIAL

## 2020-04-27 DIAGNOSIS — F32.5 DEPRESSION, MAJOR, IN REMISSION (H): Primary | ICD-10-CM

## 2020-04-27 DIAGNOSIS — R19.5 LOOSE STOOLS: ICD-10-CM

## 2020-04-27 PROCEDURE — 99214 OFFICE O/P EST MOD 30 MIN: CPT | Mod: 95 | Performed by: NURSE PRACTITIONER

## 2020-04-27 RX ORDER — FLUOXETINE 10 MG/1
CAPSULE ORAL
Qty: 90 CAPSULE | Refills: 0 | OUTPATIENT
Start: 2020-04-27

## 2020-04-27 ASSESSMENT — PATIENT HEALTH QUESTIONNAIRE - PHQ9
SUM OF ALL RESPONSES TO PHQ QUESTIONS 1-9: 8
5. POOR APPETITE OR OVEREATING: SEVERAL DAYS

## 2020-04-27 ASSESSMENT — ANXIETY QUESTIONNAIRES
GAD7 TOTAL SCORE: 7
1. FEELING NERVOUS, ANXIOUS, OR ON EDGE: SEVERAL DAYS
5. BEING SO RESTLESS THAT IT IS HARD TO SIT STILL: SEVERAL DAYS
2. NOT BEING ABLE TO STOP OR CONTROL WORRYING: SEVERAL DAYS
7. FEELING AFRAID AS IF SOMETHING AWFUL MIGHT HAPPEN: SEVERAL DAYS
3. WORRYING TOO MUCH ABOUT DIFFERENT THINGS: SEVERAL DAYS
IF YOU CHECKED OFF ANY PROBLEMS ON THIS QUESTIONNAIRE, HOW DIFFICULT HAVE THESE PROBLEMS MADE IT FOR YOU TO DO YOUR WORK, TAKE CARE OF THINGS AT HOME, OR GET ALONG WITH OTHER PEOPLE: SOMEWHAT DIFFICULT
6. BECOMING EASILY ANNOYED OR IRRITABLE: SEVERAL DAYS

## 2020-04-27 NOTE — PATIENT INSTRUCTIONS
Start getting outside daily- for an hour if able. Gradually begin activity/exercise for overall health.     Start small cleaning projects at home to keep maintaining up as best as you can.     Start increasing fiber in your diet gradually.     You can add Metamucil or similar, start with 1/2 dose daily and gradually increase if able to increase bulk of stools.     Eat healthy if you can.    Increase your Prozac to 20 mg daily. I sent over a new RX.     We can consider telephone counseling if you would like to try this.     Re-check in 3-4 weeks with another virtual visit for a mood and stool check in.

## 2020-04-28 ASSESSMENT — ANXIETY QUESTIONNAIRES: GAD7 TOTAL SCORE: 7

## 2020-06-02 DIAGNOSIS — K21.9 GASTROESOPHAGEAL REFLUX DISEASE, ESOPHAGITIS PRESENCE NOT SPECIFIED: ICD-10-CM

## 2020-06-03 NOTE — TELEPHONE ENCOUNTER
Pending Prescriptions:                       Disp   Refills    omeprazole (PRILOSEC) 20 MG DR capsule [P*90 cap*1            Sig: TAKE ONE CAPSULE BY MOUTH EVERY DAY BEFORE A MEAL    Prescription approved per [facility] Refill Protocol.    Suki Camargo, MSN, RN

## 2020-06-22 DIAGNOSIS — I10 HYPERTENSION GOAL BP (BLOOD PRESSURE) < 140/90: ICD-10-CM

## 2020-06-22 RX ORDER — CHLORTHALIDONE 25 MG/1
12.5 TABLET ORAL DAILY
Qty: 45 TABLET | Refills: 0 | Status: SHIPPED | OUTPATIENT
Start: 2020-06-22 | End: 2020-07-01

## 2020-06-23 NOTE — TELEPHONE ENCOUNTER
Prescription approved per [facility] Refill Protocol.    Due for mood follow up.    Routed to ERC Registration.  Tejal Flores, BSN, RN, PHN

## 2020-06-23 NOTE — TELEPHONE ENCOUNTER
Called patient today and left a message. Need to set up a virtual visit for mood check per note below. Thank you

## 2020-06-26 NOTE — PROGRESS NOTES
"Nicole Slaughter is a 75 year old female who is being evaluated via a billable telephone visit.      The patient has been notified of following:     \"This telephone visit will be conducted via a call between you and your physician/provider. We have found that certain health care needs can be provided without the need for a physical exam.  This service lets us provide the care you need with a short phone conversation.  If a prescription is necessary we can send it directly to your pharmacy.  If lab work is needed we can place an order for that and you can then stop by our lab to have the test done at a later time.    Telephone visits are billed at different rates depending on your insurance coverage. During this emergency period, for some insurers they may be billed the same as an in-person visit.  Please reach out to your insurance provider with any questions.    If during the course of the call the physician/provider feels a telephone visit is not appropriate, you will not be charged for this service.\"    Patient has given verbal consent for Telephone visit?  Yes    What phone number would you like to be contacted at? [phone]    How would you like to obtain your AVS? MyChart    Subjective     Nicole Slaughter is a 75 year old female who presents via phone visit today for the following health issues:    HPI     Patient declined to answer questions. Or PHQ9.    Spoke with Nicole. Very sad that she is not with her family. Grandson was in ICU from an accident and did get COVID. He is having vision issues. He is home now.     Granddaughter in [place] and unable to get home. Was supposed to be just a visit.     Had to put down her dog of 14 years. Dog had Cushings disease and then developed pneumonia.     Has been feeling very depressed and tearful. Has not been sleeping well. Can't get to sleep, stay asleep or sleep in. Just starting to eat again. Was having diarrhea but is better with Benefiber.     Denies any " suicidal ideation.     She feels that the fluoxetine at 20 mg that was increased 2 months ago has been helpful until this past week.     Having a lot of trouble sleeping. Has tried melatonin and has not helped.          Hyperlipidemia Follow-Up      Are you regularly taking any medication or supplement to lower your cholesterol?   Yes- Lipitor    Are you having muscle aches or other side effects that you think could be caused by your cholesterol lowering medication?  No    Hypertension Follow-up      Do you check your blood pressure regularly outside of the clinic? No     Are you following a low salt diet? Yes    Are your blood pressures ever more than 140 on the top number (systolic) OR more   than 90 on the bottom number (diastolic), for example 140/90? No      Patient Active Problem List   Diagnosis     Generalized osteoarthrosis, unspecified site     Arthropathy     Hypertension goal BP (blood pressure) < 140/90     Migraine headache     Hyperlipidemia LDL goal <130     Knee pain     Back pain     Neck pain     Arthritis of hand, degenerative     GERD (gastroesophageal reflux disease)     Health Care Home     Advanced directives, counseling/discussion     Depression, major, in remission (H)     Thyroid nodule     Osteopenia     Early menopause     Past Surgical History:   Procedure Laterality Date     HYSTERECTOMY, TANG       ORTHOPEDIC SURGERY       SOFT TISSUE SURGERY         Social History     Tobacco Use     Smoking status: Never Smoker     Smokeless tobacco: Never Used   Substance Use Topics     Alcohol use: Yes     Comment: 5 drinks per week / wine      Family History   Problem Relation Age of Onset     Arthritis Mother      Lipids Mother      Hypertension Mother      Breast Cancer Mother      Anesthesia Reaction Mother      Heart Disease Father      Cerebrovascular Disease Father      Arthritis Maternal Grandmother      Breast Cancer Maternal Aunt      Breast Cancer Maternal Aunt      Diabetes No family hx  of      Coronary Artery Disease No family hx of      Hyperlipidemia No family hx of      Prostate Cancer No family hx of      Other Cancer No family hx of      Anxiety Disorder No family hx of      Mental Illness No family hx of      Substance Abuse No family hx of            Reviewed and updated as needed this visit by Provider  Tobacco  Allergies  Meds  Problems  Med Hx  Surg Hx  Fam Hx         Review of Systems   CONSTITUTIONAL: NEGATIVE for fever, chills, change in weight  RESP: NEGATIVE for significant cough or SOB  CV: NEGATIVE for chest pain, palpitations or peripheral edema  GI: NEGATIVE for nausea, abdominal pain, heartburn, or change in bowel habits  MUSCULOSKELETAL: NEGATIVE for significant arthralgias or myalgia  NEURO: NEGATIVE for weakness, dizziness or paresthesias  PSYCHIATRIC: as above.        Objective   Reported vitals:  There were no vitals taken for this visit.   alert and tearful at times.  PSYCH: Alert and oriented times 3; coherent speech, normal   rate and volume, able to articulate logical thoughts, able   to abstract reason, no tangential thoughts, no hallucinations   or delusions  Her affect is tearful at times  RESP: No cough, no audible wheezing, able to talk in full sentences  Remainder of exam unable to be completed due to telephone visits    Diagnostic Test Results:  Labs reviewed in Epic        Assessment/Plan:  1. Hypertension goal BP (blood pressure) < 140/90  Doing well. Well controlled. Tolerating medication.  No change in plan.    Will return for recheck of blood pressure in 3 months  - chlorthalidone (HYGROTON) 25 MG tablet; Take 0.5 tablets (12.5 mg) by mouth daily  Dispense: 45 tablet; Refill: 1    2. Depression, major, in remission (H)  Doing well. Well controlled. Tolerating medication.  No change in plan.    - FLUoxetine (PROZAC) 20 MG capsule; Take 1 capsule (20 mg) by mouth daily  Dispense: 90 capsule; Refill: 3    3. Hyperlipidemia LDL goal <130  Doing well.  Well controlled. Tolerating medication.  No change in plan.    Consider fasting labs in 3 months.   - atorvastatin (LIPITOR) 40 MG tablet; Take 1 tablet (40 mg) by mouth twice a week  Dispense: 24 tablet; Refill: 1    4. Primary insomnia  Patient with difficulty sleeping.   Trial of hydroxyzine given.   Recheck as needed  - hydrOXYzine (ATARAX) 25 MG tablet; Take 1 tablet (25 mg) by mouth nightly as needed for other (insomnia)  Dispense: 30 tablet; Refill: 1    5. Encounter for screening mammogram for breast cancer  Due at this time. Order placed.   - *MA Screening Digital Bilateral; Future    Return in about 3 months (around 10/1/2020) for Recheck.      Phone call duration:  15 minutes    Lizett Parikh MD

## 2020-07-01 ENCOUNTER — VIRTUAL VISIT (OUTPATIENT)
Dept: FAMILY MEDICINE | Facility: OTHER | Age: 75
End: 2020-07-01
Payer: COMMERCIAL

## 2020-07-01 ENCOUNTER — MYC REFILL (OUTPATIENT)
Dept: FAMILY MEDICINE | Facility: CLINIC | Age: 75
End: 2020-07-01

## 2020-07-01 DIAGNOSIS — Z79.899 ENCOUNTER FOR LONG-TERM (CURRENT) USE OF MEDICATIONS: ICD-10-CM

## 2020-07-01 DIAGNOSIS — G43.009 MIGRAINE WITHOUT AURA AND WITHOUT STATUS MIGRAINOSUS, NOT INTRACTABLE: ICD-10-CM

## 2020-07-01 DIAGNOSIS — F51.01 PRIMARY INSOMNIA: ICD-10-CM

## 2020-07-01 DIAGNOSIS — I10 HYPERTENSION GOAL BP (BLOOD PRESSURE) < 140/90: Primary | ICD-10-CM

## 2020-07-01 DIAGNOSIS — F32.5 DEPRESSION, MAJOR, IN REMISSION (H): ICD-10-CM

## 2020-07-01 DIAGNOSIS — Z12.31 ENCOUNTER FOR SCREENING MAMMOGRAM FOR BREAST CANCER: ICD-10-CM

## 2020-07-01 DIAGNOSIS — E78.5 HYPERLIPIDEMIA LDL GOAL <130: ICD-10-CM

## 2020-07-01 PROCEDURE — 99214 OFFICE O/P EST MOD 30 MIN: CPT | Mod: 95 | Performed by: FAMILY MEDICINE

## 2020-07-01 RX ORDER — SUMATRIPTAN 50 MG/1
TABLET, FILM COATED ORAL
Qty: 18 TABLET | Refills: 0 | Status: SHIPPED | OUTPATIENT
Start: 2020-07-01 | End: 2020-10-06

## 2020-07-01 RX ORDER — CHLORTHALIDONE 25 MG/1
12.5 TABLET ORAL DAILY
Qty: 45 TABLET | Refills: 1 | Status: SHIPPED | OUTPATIENT
Start: 2020-07-01 | End: 2020-12-01

## 2020-07-01 RX ORDER — HYDROXYZINE HYDROCHLORIDE 25 MG/1
25 TABLET, FILM COATED ORAL
Qty: 30 TABLET | Refills: 1 | Status: SHIPPED | OUTPATIENT
Start: 2020-07-01 | End: 2020-12-01 | Stop reason: SINTOL

## 2020-07-01 RX ORDER — ATORVASTATIN CALCIUM 40 MG/1
40 TABLET, FILM COATED ORAL
Qty: 24 TABLET | Refills: 1 | Status: SHIPPED | OUTPATIENT
Start: 2020-07-02 | End: 2020-12-01

## 2020-07-01 NOTE — TELEPHONE ENCOUNTER
Pending Prescriptions:                       Disp   Refills    SUMAtriptan (IMITREX) 50 MG tablet        18 tab*0            Sig: Take one tablet at onset of headache. May repeat           in 2 hours as needed. Max 2/day    Prescription approved per [facility].    Suki Camargo, MSN, RN

## 2020-07-13 ENCOUNTER — ANCILLARY PROCEDURE (OUTPATIENT)
Dept: MAMMOGRAPHY | Facility: CLINIC | Age: 75
End: 2020-07-13
Attending: FAMILY MEDICINE
Payer: COMMERCIAL

## 2020-07-13 DIAGNOSIS — Z12.31 ENCOUNTER FOR SCREENING MAMMOGRAM FOR BREAST CANCER: ICD-10-CM

## 2020-07-13 PROCEDURE — 77067 SCR MAMMO BI INCL CAD: CPT | Performed by: STUDENT IN AN ORGANIZED HEALTH CARE EDUCATION/TRAINING PROGRAM

## 2020-07-13 PROCEDURE — 77063 BREAST TOMOSYNTHESIS BI: CPT | Performed by: STUDENT IN AN ORGANIZED HEALTH CARE EDUCATION/TRAINING PROGRAM

## 2020-08-29 DIAGNOSIS — B00.9 HERPES SIMPLEX VIRUS INFECTION: ICD-10-CM

## 2020-09-01 RX ORDER — ACYCLOVIR 400 MG/1
TABLET ORAL
Qty: 60 TABLET | Refills: 0 | Status: SHIPPED | OUTPATIENT
Start: 2020-09-01 | End: 2020-11-05

## 2020-10-05 DIAGNOSIS — G43.009 MIGRAINE WITHOUT AURA AND WITHOUT STATUS MIGRAINOSUS, NOT INTRACTABLE: ICD-10-CM

## 2020-10-06 RX ORDER — SUMATRIPTAN 50 MG/1
TABLET, FILM COATED ORAL
Qty: 18 TABLET | Refills: 0 | Status: SHIPPED | OUTPATIENT
Start: 2020-10-06 | End: 2020-12-01

## 2020-10-07 NOTE — TELEPHONE ENCOUNTER
Pending Prescriptions:                       Disp   Refills    SUMAtriptan (IMITREX) 50 MG tablet        18 tab*0            Sig: Take one tablet at onset of headache. May repeat           in 2 hours as needed. Max 2/day    Medication is being filled for 1 time mariangel refill only due to:  Patient is due for BP and med check    Please call and help schedule.  Thank you!

## 2020-10-16 NOTE — TELEPHONE ENCOUNTER
Spoke with patient regarding message below. She will call to schedule with Dr Parikh once she gets low on her Imitrex.

## 2020-11-06 ENCOUNTER — TELEPHONE (OUTPATIENT)
Dept: FAMILY MEDICINE | Facility: CLINIC | Age: 75
End: 2020-11-06

## 2020-11-06 NOTE — TELEPHONE ENCOUNTER
Summary:    Patient is due/failing the following:   Medicare annual wellness visit, LDL and BMP    Action needed:   Patient needs office visit for Medicare annual wellness . and Patient needs fasting lab only appointment    Type of outreach:    Phone, spoke to patient.  patient scheduled     Questions for provider review:    None                                                                                                                                    Josi Ward [facility]     Chart routed to Care Team .          Panel Management Review      Patient has the following on her problem list:     Depression / Dysthymia review    Measure:  Needs PHQ-9 score of 4 or less during index window.  Administer PHQ-9 and if score is 5 or more, send encounter to provider for next steps.        PHQ-9 SCORE 7/31/2018 8/13/2019 4/27/2020   PHQ-9 Total Score - - -   PHQ-9 Total Score MyChart - - -   PHQ-9 Total Score 2 4 8       If PHQ-9 recheck is 5 or more, route to provider for next steps.    Patient is due for:  PHQ9    Hypertension   Last three blood pressure readings:  BP Readings from Last 3 Encounters:   09/24/19 132/76   08/13/19 102/72   04/26/19 153/81     Blood pressure: Passed    HTN Guidelines:  Less than 140/90      Composite cancer screening  Chart review shows that this patient is due/due soon for the following None

## 2020-11-24 ASSESSMENT — ENCOUNTER SYMPTOMS
WEAKNESS: 0
HEADACHES: 1
CONSTIPATION: 0
HEMATOCHEZIA: 0
FREQUENCY: 1
MYALGIAS: 1
NERVOUS/ANXIOUS: 1
ABDOMINAL PAIN: 0
HEARTBURN: 0
NAUSEA: 0
COUGH: 0
SORE THROAT: 0
FEVER: 0
PARESTHESIAS: 0
PALPITATIONS: 0
DIARRHEA: 0
JOINT SWELLING: 0
CHILLS: 0
SHORTNESS OF BREATH: 0
DYSURIA: 0
ARTHRALGIAS: 1
HEMATURIA: 0
EYE PAIN: 0
DIZZINESS: 1
BREAST MASS: 0

## 2020-11-24 ASSESSMENT — ACTIVITIES OF DAILY LIVING (ADL): CURRENT_FUNCTION: NO ASSISTANCE NEEDED

## 2020-11-25 DIAGNOSIS — Z79.899 ENCOUNTER FOR LONG-TERM (CURRENT) USE OF MEDICATIONS: ICD-10-CM

## 2020-11-25 DIAGNOSIS — I10 HYPERTENSION GOAL BP (BLOOD PRESSURE) < 140/90: ICD-10-CM

## 2020-11-25 DIAGNOSIS — E78.5 HYPERLIPIDEMIA LDL GOAL <130: ICD-10-CM

## 2020-11-25 LAB
ALBUMIN SERPL-MCNC: 3.7 G/DL (ref 3.4–5)
ALP SERPL-CCNC: 82 U/L (ref 40–150)
ALT SERPL W P-5'-P-CCNC: 29 U/L (ref 0–50)
ANION GAP SERPL CALCULATED.3IONS-SCNC: 7 MMOL/L (ref 3–14)
AST SERPL W P-5'-P-CCNC: 14 U/L (ref 0–45)
BILIRUB SERPL-MCNC: 0.7 MG/DL (ref 0.2–1.3)
BUN SERPL-MCNC: 17 MG/DL (ref 7–30)
CALCIUM SERPL-MCNC: 8.8 MG/DL (ref 8.5–10.1)
CHLORIDE SERPL-SCNC: 103 MMOL/L (ref 94–109)
CHOLEST SERPL-MCNC: 208 MG/DL
CO2 SERPL-SCNC: 29 MMOL/L (ref 20–32)
CREAT SERPL-MCNC: 0.65 MG/DL (ref 0.52–1.04)
ERYTHROCYTE [DISTWIDTH] IN BLOOD BY AUTOMATED COUNT: 13.8 % (ref 10–15)
GFR SERPL CREATININE-BSD FRML MDRD: 88 ML/MIN/{1.73_M2}
GLUCOSE SERPL-MCNC: 90 MG/DL (ref 70–99)
HCT VFR BLD AUTO: 38 % (ref 35–47)
HDLC SERPL-MCNC: 67 MG/DL
HGB BLD-MCNC: 12.2 G/DL (ref 11.7–15.7)
LDLC SERPL CALC-MCNC: 124 MG/DL
MCH RBC QN AUTO: 29.2 PG (ref 26.5–33)
MCHC RBC AUTO-ENTMCNC: 32.1 G/DL (ref 31.5–36.5)
MCV RBC AUTO: 91 FL (ref 78–100)
NONHDLC SERPL-MCNC: 141 MG/DL
PLATELET # BLD AUTO: 262 10E9/L (ref 150–450)
POTASSIUM SERPL-SCNC: 3.6 MMOL/L (ref 3.4–5.3)
PROT SERPL-MCNC: 7.1 G/DL (ref 6.8–8.8)
RBC # BLD AUTO: 4.18 10E12/L (ref 3.8–5.2)
SODIUM SERPL-SCNC: 139 MMOL/L (ref 133–144)
TRIGL SERPL-MCNC: 87 MG/DL
WBC # BLD AUTO: 6.4 10E9/L (ref 4–11)

## 2020-11-25 PROCEDURE — 80053 COMPREHEN METABOLIC PANEL: CPT | Performed by: FAMILY MEDICINE

## 2020-11-25 PROCEDURE — 85027 COMPLETE CBC AUTOMATED: CPT | Performed by: FAMILY MEDICINE

## 2020-11-25 PROCEDURE — 36415 COLL VENOUS BLD VENIPUNCTURE: CPT | Performed by: FAMILY MEDICINE

## 2020-11-25 PROCEDURE — 80061 LIPID PANEL: CPT | Performed by: FAMILY MEDICINE

## 2020-11-25 ASSESSMENT — ENCOUNTER SYMPTOMS
NERVOUS/ANXIOUS: 1
DIZZINESS: 1
ARTHRALGIAS: 1
DYSURIA: 0
EYE PAIN: 0
HEADACHES: 1
MYALGIAS: 1
WEAKNESS: 0
SHORTNESS OF BREATH: 0
FEVER: 0
SORE THROAT: 0
DIARRHEA: 0
CHILLS: 0
NAUSEA: 0
COUGH: 0
BREAST MASS: 0
PARESTHESIAS: 0
CONSTIPATION: 0
ABDOMINAL PAIN: 0
FREQUENCY: 1
PALPITATIONS: 0
HEARTBURN: 0
HEMATURIA: 0
HEMATOCHEZIA: 0
JOINT SWELLING: 0

## 2020-11-25 ASSESSMENT — ACTIVITIES OF DAILY LIVING (ADL): CURRENT_FUNCTION: NO ASSISTANCE NEEDED

## 2020-11-25 NOTE — PROGRESS NOTES
"SUBJECTIVE:   Nicole Slaughter is a 75 year old female who presents for Preventive Visit.      Patient has been advised of split billing requirements and indicates understanding: Yes   Are you in the first 12 months of your Medicare coverage?  No    Healthy Habits:     In general, how would you rate your overall health?  Fair    Frequency of exercise:  None    Do you usually eat at least 4 servings of fruit and vegetables a day, include whole grains    & fiber and avoid regularly eating high fat or \"junk\" foods?  Yes    Taking medications regularly:  Yes    Medication side effects:  Lightheadedness    Ability to successfully perform activities of daily living:  No assistance needed    Home Safety:  No safety concerns identified    Hearing Impairment:  Difficulty understanding soft or whispered speech    In the past 6 months, have you been bothered by leaking of urine? Yes    In general, how would you rate your overall mental or emotional health?  Fair      PHQ-2 Total Score: 2    Additional concerns today:  No    Feeling fatigued. \"Can't wait to get to sleep\". Taking naps which has increased. Feels down. Continues on fluoxetine 20 mg daily. Feeling tired of covid. Missing family and friends and giving hugs.       Do you feel safe in your environment? Yes    Have you ever done Advance Care Planning? (For example, a Health Directive, POLST, or a discussion with a medical provider or your loved ones about your wishes): No, advance care planning information given to patient to review.  Patient plans to discuss their wishes with loved ones or provider.        Fall risk       Cognitive Screening   1) Repeat 3 items (Leader, Season, Table)    2) Clock draw: NORMAL  3) 3 item recall: Recalls 3 objects  Results: NORMAL clock, 3 items recalled: COGNITIVE IMPAIRMENT LESS LIKELY    Mini-CogTM Copyright BABAR Chowdhury. Licensed by the author for use in [facility] [employer]; reprinted with permission (mechelle@.[facility]). All rights " reserved.      Do you have sleep apnea, excessive snoring or daytime drowsiness?: yes    Reviewed and updated as needed this visit by clinical staff  Tobacco  Allergies  Meds     Fam Hx  Soc Hx        Reviewed and updated as needed this visit by Provider                Social History     Tobacco Use     Smoking status: Never Smoker     Smokeless tobacco: Never Used   Substance Use Topics     Alcohol use: Yes     Comment: 5 drinks per week / wine      If you drink alcohol do you typically have >3 drinks per day or >7 drinks per week? Not applicable    Alcohol Use 12/1/2020   Prescreen: >3 drinks/day or >7 drinks/week? -   Prescreen: >3 drinks/day or >7 drinks/week? Not Applicable           Hyperlipidemia Follow-Up      Are you regularly taking any medication or supplement to lower your cholesterol?   Yes- Lipitor    Are you having muscle aches or other side effects that you think could be caused by your cholesterol lowering medication?  No    Hypertension Follow-up      Do you check your blood pressure regularly outside of the clinic? No     Are you following a low salt diet? Yes    Are your blood pressures ever more than 140 on the top number (systolic) OR more   than 90 on the bottom number (diastolic), for example 140/90? No    Depression Followup    How are you doing with your depression since your last visit? No change    Are you having other symptoms that might be associated with depression? Yes:  see above.     Have you had a significant life event?  OTHER: pandemic     Are you feeling anxious or having panic attacks?   No    Do you have any concerns with your use of alcohol or other drugs? No    Social History     Tobacco Use     Smoking status: Never Smoker     Smokeless tobacco: Never Used   Substance Use Topics     Alcohol use: Yes     Comment: 5 drinks per week / wine      Drug use: No     PHQ 8/13/2019 4/27/2020 12/1/2020   PHQ-9 Total Score 4 8 13   Q9: Thoughts of better off dead/self-harm past 2  weeks Not at all Not at all Not at all     ASHIWN-7 SCORE 7/31/2018 4/27/2020 12/1/2020   Total Score - - -   Total Score 2 7 8           Suicide Assessment Five-step Evaluation and Treatment (SAFE-T)    Migraine     Since your last clinic visit, how have your headaches changed?  No change    How often are you getting headaches or migraines? 1 time per month     Are you able to do normal daily activities when you have a migraine? No    Are you taking rescue/relief medications? (Select all that apply) sumatriptan (Imitrex)    How helpful is your rescue/relief medication?  I get total relief    Are you taking any medications to prevent migraines? (Select all that apply)  Neurontin    In the past 4 weeks, how often have you gone to urgent care or the emergency room because of your headaches?  0    Hypothyroidism Follow-up      Since last visit, patient describes the following symptoms: Weight stable, no hair loss, no skin changes, no constipation, no loose stools    REFLUX  Doing well with omeprazole. No concerns. Needs refills.    THYROID NODULE  Last checked early in 2019. She had FNA done 6/2019. This showed atypia undetermined significance. Afirma thyroid test was reassuring.  She feels like the nodule could be growing. She would like to have this checked on her exam    HERPES  Doing well with her acyclovir. Needs refills. Takes every day.       Current providers sharing in care for this patient include:   Patient Care Team:  Lizett Parikh MD as PCP - General (Family Practice)  Lizett Parikh MD as Assigned PCP    The following health maintenance items are reviewed in Epic and correct as of today:  Health Maintenance   Topic Date Due     ZOSTER IMMUNIZATION (2 of 3) 12/19/2012     FALL RISK ASSESSMENT  08/13/2020     COLORECTAL CANCER SCREENING  01/07/2021     PHQ-9  06/01/2021     BMP  11/25/2021     LIPID  11/25/2021     MEDICARE ANNUAL WELLNESS VISIT  12/01/2021     MAMMO SCREENING  07/13/2022      "DTAP/TDAP/TD IMMUNIZATION (4 - Td) 07/11/2023     DEXA  06/04/2024     ADVANCE CARE PLANNING  12/01/2025     HEPATITIS C SCREENING  Completed     DEPRESSION ACTION PLAN  Completed     MIGRAINE ACTION PLAN  Completed     INFLUENZA VACCINE  Completed     Pneumococcal Vaccine: 65+ Years  Completed     Pneumococcal Vaccine: Pediatrics (0 to 5 Years) and At-Risk Patients (6 to 64 Years)  Aged Out     IPV IMMUNIZATION  Aged Out     MENINGITIS IMMUNIZATION  Aged Out     HEPATITIS B IMMUNIZATION  Aged Out     BP Readings from Last 3 Encounters:   12/01/20 132/66   09/24/19 132/76   08/13/19 102/72    Wt Readings from Last 3 Encounters:   12/01/20 69.1 kg (152 lb 4.8 oz)   08/13/19 70.3 kg (155 lb)   04/26/19 72.3 kg (159 lb 8 oz)                      Review of Systems   Constitutional: Negative for chills and fever.   HENT: Positive for hearing loss. Negative for congestion, ear pain and sore throat.    Eyes: Negative for pain and visual disturbance.   Respiratory: Negative for cough and shortness of breath.    Cardiovascular: Negative for chest pain, palpitations and peripheral edema.   Gastrointestinal: Negative for abdominal pain, constipation, diarrhea, heartburn, hematochezia and nausea.   Breasts:  Negative for tenderness, breast mass and discharge.   Genitourinary: Positive for frequency and urgency. Negative for dysuria, genital sores, hematuria, pelvic pain, vaginal bleeding and vaginal discharge.   Musculoskeletal: Positive for arthralgias and myalgias. Negative for joint swelling.   Skin: Negative for rash.   Neurological: Positive for dizziness and headaches. Negative for weakness and paresthesias.   Psychiatric/Behavioral: Positive for mood changes. The patient is nervous/anxious.    All other systems reviewed and are negative.        OBJECTIVE:   /66   Pulse 77   Temp 98.3  F (36.8  C) (Temporal)   Resp 16   Ht 1.577 m (5' 2.09\")   Wt 69.1 kg (152 lb 4.8 oz)   SpO2 95%   BMI 27.78 kg/m   Estimated " "body mass index is 27.78 kg/m  as calculated from the following:    Height as of this encounter: 1.577 m (5' 2.09\").    Weight as of this encounter: 69.1 kg (152 lb 4.8 oz).  Physical Exam  GENERAL APPEARANCE: healthy, alert and no distress  EYES: Eyes grossly normal to inspection, PERRL and conjunctivae and sclerae normal  HENT: cerumen impacted bilaterally. nose and mouth without ulcers or lesions, oropharynx clear and oral mucous membranes moist  NECK: no adenopathy, no asymmetry, masses, or scars and thyroid normal to palpation  RESP: lungs clear to auscultation - no rales, rhonchi or wheezes  CV: regular rate and rhythm, normal S1 S2, no S3 or S4, no murmur, click or rub, no peripheral edema and peripheral pulses strong  ABDOMEN: soft, nontender, no hepatosplenomegaly, no masses and bowel sounds normal  MS: no musculoskeletal defects are noted and gait is age appropriate without ataxia  SKIN: no suspicious lesions or rashes  NEURO: Normal strength and tone, sensory exam grossly normal, mentation intact and speech normal  PSYCH: mentation appears normal and affect normal/bright    Diagnostic Test Results:  Labs reviewed in Epic    ASSESSMENT / PLAN:   1. Encounter for Medicare annual wellness exam  See counseling messages     2. Depression, major, in remission (H)  Does not seem as well controlled at this time. Will check labs as noted today. Reviewed recent labs that did not show concerns.   If TSH and vitamin D ok, will consider increase in fluoxetine.   Nicole is in agreement.     3. Migraine without aura and without status migrainosus, not intractable  Doing well. Well controlled. Tolerating medication.  No change in plan.    - gabapentin (NEURONTIN) 100 MG capsule; TAKE ONE CAPSULE BY MOUTH THREE TIMES DAILY  Dispense: 270 capsule; Refill: 3  - SUMAtriptan (IMITREX) 50 MG tablet; Take one tablet at onset of headache. May repeat in 2 hours as needed. Max 2/day  Dispense: 18 tablet; Refill: 0    4. Fatigue, " unspecified type  Evaluate for vitamin D deficiency, for thyroid issue  Normal liver function, kidney function on recent labs. No anemia noted.   See above.   - TSH with free T4 reflex    5. Hypertension goal BP (blood pressure) < 140/90  Doing well. Well controlled. Tolerating medication.  No change in plan.    - chlorthalidone (HYGROTON) 25 MG tablet; Take 0.5 tablets (12.5 mg) by mouth daily  Dispense: 45 tablet; Refill: 3    6. Hyperlipidemia LDL goal <130  Doing well. Well controlled. Tolerating medication.  No change in plan.    - atorvastatin (LIPITOR) 40 MG tablet; Take 1 tablet (40 mg) by mouth twice a week  Dispense: 24 tablet; Refill: 3    7. Herpes simplex virus infection  Doing well. Well controlled. Tolerating medication.  No change in plan.    - acyclovir (ZOVIRAX) 400 MG tablet; TAKE ONE TABLET BY MOUTH TWICE DAILY  Dispense: 60 tablet; Refill: 3    8. Bilateral impacted cerumen  Minimal results today. Will use debrox at home and recheck as needed.   - REMOVE IMPACTED CERUMEN    9. Pain in both knees, unspecified chronicity  Patient with continued bilateral knee pain.   Did well with steroid injections in the past.   Would like to see someone about that again.   Referral placed.   - Orthopedic & Spine  Referral; Future    10. Thyroid nodule  Patient with thyroid nodule history as above.   No changes noted on exam.   Recommend repeat thyroid ultrasound at this time.   - US Thyroid; Future    11. Gastroesophageal reflux disease, unspecified whether esophagitis present  Doing well. Well controlled. Tolerating medication.  No change in plan.    - omeprazole (PRILOSEC) 20 MG DR capsule; TAKE ONE CAPSULE BY MOUTH EVERY DAY BEFORE A MEAL  Dispense: 90 capsule; Refill: 3    12. Vitamin D deficiency  Will notify of results.   - Vitamin D Deficiency    Patient has been advised of split billing requirements and indicates understanding: Yes  COUNSELING:  Reviewed preventive health counseling, as  "reflected in patient instructions       Regular exercise       Healthy diet/nutrition       Colon cancer screening    Estimated body mass index is 27.78 kg/m  as calculated from the following:    Height as of this encounter: 1.577 m (5' 2.09\").    Weight as of this encounter: 69.1 kg (152 lb 4.8 oz).        She reports that she has never smoked. She has never used smokeless tobacco.       Did bilateral ear lavage with minimal results.   She will go home and apply OTC debrox oil..   CHEY SAENZ    Appropriate preventive services were discussed with this patient, including applicable screening as appropriate for cardiovascular disease, diabetes, osteopenia/osteoporosis, and glaucoma.  As appropriate for age/gender, discussed screening for colorectal cancer, prostate cancer, breast cancer, and cervical cancer. Checklist reviewing preventive services available has been given to the patient.    Reviewed patients plan of care and provided an AVS. The Basic Care Plan (routine screening as documented in Health Maintenance) for Nicole meets the Care Plan requirement. This Care Plan has been established and reviewed with the Patient.    Counseling Resources:  ATP IV Guidelines  Pooled Cohorts Equation Calculator  Breast Cancer Risk Calculator  Breast Cancer: Medication to Reduce Risk  FRAX Risk Assessment  ICSI Preventive Guidelines  Dietary Guidelines for Americans, 2010  [employer]'s MyPlate  ASA Prophylaxis  Lung CA Screening    Lizett Parikh MD  [facility]    Identified Health Risks:    The patient was provided with suggestions to help her develop a healthy physical lifestyle.  She is at risk for lack of exercise and has been provided with information to increase physical activity for the benefit of her well-being.  The patient was provided with written information regarding signs of hearing loss.  Information on urinary incontinence and treatment options given to patient.  The patient was provided with " suggestions to help her develop a healthy emotional lifestyle.  The patient s PHQ-9 score is consistent with moderate depression. She was provided with information regarding depression and was advised to schedule a follow up appointment once medication change recommended after results available.

## 2020-12-01 ENCOUNTER — OFFICE VISIT (OUTPATIENT)
Dept: FAMILY MEDICINE | Facility: CLINIC | Age: 75
End: 2020-12-01
Payer: COMMERCIAL

## 2020-12-01 VITALS
OXYGEN SATURATION: 95 % | DIASTOLIC BLOOD PRESSURE: 66 MMHG | BODY MASS INDEX: 28.03 KG/M2 | SYSTOLIC BLOOD PRESSURE: 132 MMHG | TEMPERATURE: 98.3 F | HEART RATE: 77 BPM | HEIGHT: 62 IN | RESPIRATION RATE: 16 BRPM | WEIGHT: 152.3 LBS

## 2020-12-01 DIAGNOSIS — E04.1 THYROID NODULE: ICD-10-CM

## 2020-12-01 DIAGNOSIS — I10 HYPERTENSION GOAL BP (BLOOD PRESSURE) < 140/90: ICD-10-CM

## 2020-12-01 DIAGNOSIS — E78.5 HYPERLIPIDEMIA LDL GOAL <130: ICD-10-CM

## 2020-12-01 DIAGNOSIS — M25.562 PAIN IN BOTH KNEES, UNSPECIFIED CHRONICITY: ICD-10-CM

## 2020-12-01 DIAGNOSIS — M25.561 PAIN IN BOTH KNEES, UNSPECIFIED CHRONICITY: ICD-10-CM

## 2020-12-01 DIAGNOSIS — R53.83 FATIGUE, UNSPECIFIED TYPE: ICD-10-CM

## 2020-12-01 DIAGNOSIS — G43.009 MIGRAINE WITHOUT AURA AND WITHOUT STATUS MIGRAINOSUS, NOT INTRACTABLE: ICD-10-CM

## 2020-12-01 DIAGNOSIS — B00.9 HERPES SIMPLEX VIRUS INFECTION: ICD-10-CM

## 2020-12-01 DIAGNOSIS — H61.23 BILATERAL IMPACTED CERUMEN: ICD-10-CM

## 2020-12-01 DIAGNOSIS — K21.9 GASTROESOPHAGEAL REFLUX DISEASE, UNSPECIFIED WHETHER ESOPHAGITIS PRESENT: ICD-10-CM

## 2020-12-01 DIAGNOSIS — Z00.00 ENCOUNTER FOR MEDICARE ANNUAL WELLNESS EXAM: Primary | ICD-10-CM

## 2020-12-01 DIAGNOSIS — E55.9 VITAMIN D DEFICIENCY: ICD-10-CM

## 2020-12-01 DIAGNOSIS — F32.5 DEPRESSION, MAJOR, IN REMISSION (H): ICD-10-CM

## 2020-12-01 LAB — TSH SERPL DL<=0.005 MIU/L-ACNC: 1.62 MU/L (ref 0.4–4)

## 2020-12-01 PROCEDURE — 99397 PER PM REEVAL EST PAT 65+ YR: CPT | Mod: 25 | Performed by: FAMILY MEDICINE

## 2020-12-01 PROCEDURE — 69209 REMOVE IMPACTED EAR WAX UNI: CPT | Performed by: FAMILY MEDICINE

## 2020-12-01 PROCEDURE — 99214 OFFICE O/P EST MOD 30 MIN: CPT | Mod: 25 | Performed by: FAMILY MEDICINE

## 2020-12-01 PROCEDURE — 36415 COLL VENOUS BLD VENIPUNCTURE: CPT | Performed by: FAMILY MEDICINE

## 2020-12-01 PROCEDURE — 84443 ASSAY THYROID STIM HORMONE: CPT | Performed by: FAMILY MEDICINE

## 2020-12-01 PROCEDURE — 82306 VITAMIN D 25 HYDROXY: CPT | Performed by: FAMILY MEDICINE

## 2020-12-01 RX ORDER — ATORVASTATIN CALCIUM 40 MG/1
40 TABLET, FILM COATED ORAL
Qty: 24 TABLET | Refills: 3 | Status: SHIPPED | OUTPATIENT
Start: 2020-12-03 | End: 2021-11-29

## 2020-12-01 RX ORDER — GABAPENTIN 100 MG/1
CAPSULE ORAL
Qty: 270 CAPSULE | Refills: 3 | Status: SHIPPED | OUTPATIENT
Start: 2020-12-01 | End: 2021-12-22

## 2020-12-01 RX ORDER — SUMATRIPTAN 50 MG/1
TABLET, FILM COATED ORAL
Qty: 18 TABLET | Refills: 0 | Status: SHIPPED | OUTPATIENT
Start: 2020-12-01 | End: 2021-03-10

## 2020-12-01 RX ORDER — ACYCLOVIR 400 MG/1
TABLET ORAL
Qty: 60 TABLET | Refills: 3 | Status: SHIPPED | OUTPATIENT
Start: 2020-12-01 | End: 2021-05-17

## 2020-12-01 RX ORDER — CHLORTHALIDONE 25 MG/1
12.5 TABLET ORAL DAILY
Qty: 45 TABLET | Refills: 3 | Status: SHIPPED | OUTPATIENT
Start: 2020-12-01 | End: 2021-12-02

## 2020-12-01 RX ORDER — FLUOXETINE 10 MG/1
10 CAPSULE ORAL DAILY
COMMUNITY
Start: 2020-02-07 | End: 2020-12-03

## 2020-12-01 ASSESSMENT — MIFFLIN-ST. JEOR: SCORE: 1150.46

## 2020-12-01 ASSESSMENT — PATIENT HEALTH QUESTIONNAIRE - PHQ9
SUM OF ALL RESPONSES TO PHQ QUESTIONS 1-9: 13
5. POOR APPETITE OR OVEREATING: SEVERAL DAYS

## 2020-12-01 ASSESSMENT — ANXIETY QUESTIONNAIRES
5. BEING SO RESTLESS THAT IT IS HARD TO SIT STILL: MORE THAN HALF THE DAYS
6. BECOMING EASILY ANNOYED OR IRRITABLE: MORE THAN HALF THE DAYS
1. FEELING NERVOUS, ANXIOUS, OR ON EDGE: SEVERAL DAYS
2. NOT BEING ABLE TO STOP OR CONTROL WORRYING: SEVERAL DAYS
3. WORRYING TOO MUCH ABOUT DIFFERENT THINGS: SEVERAL DAYS
GAD7 TOTAL SCORE: 8
7. FEELING AFRAID AS IF SOMETHING AWFUL MIGHT HAPPEN: NOT AT ALL

## 2020-12-02 ENCOUNTER — ANCILLARY PROCEDURE (OUTPATIENT)
Dept: ULTRASOUND IMAGING | Facility: OTHER | Age: 75
End: 2020-12-02
Attending: FAMILY MEDICINE
Payer: COMMERCIAL

## 2020-12-02 DIAGNOSIS — E04.1 THYROID NODULE: ICD-10-CM

## 2020-12-02 LAB — DEPRECATED CALCIDIOL+CALCIFEROL SERPL-MC: 35 UG/L (ref 20–75)

## 2020-12-02 ASSESSMENT — ANXIETY QUESTIONNAIRES: GAD7 TOTAL SCORE: 8

## 2020-12-03 ENCOUNTER — TELEPHONE (OUTPATIENT)
Dept: FAMILY MEDICINE | Facility: CLINIC | Age: 75
End: 2020-12-03

## 2020-12-03 DIAGNOSIS — F32.5 DEPRESSION, MAJOR, IN REMISSION (H): Primary | ICD-10-CM

## 2020-12-03 RX ORDER — FLUOXETINE 40 MG/1
40 CAPSULE ORAL DAILY
Qty: 30 CAPSULE | Refills: 1 | Status: SHIPPED | OUTPATIENT
Start: 2020-12-03 | End: 2021-01-28

## 2020-12-04 NOTE — PROGRESS NOTES
Sports Medicine Clinic Visit    PCP: Lizett Parikh    CC: Patient presents with:  Left Knee - Pain  Right Knee - Pain      HPI:  Nicole Slaughter is a 75 year old female who is seen in consultation at the request of Dr. Parikh.   She notes bilateral knee pain. She notes the left knee has been bothersome in the past. She was told that she had arthritis in the left knee. She notes she had twisted the right knee a couple of years ago and she has a lot of pain with walking. She has had a gel injection in the left knee and it did not help that knee.  She rates the pain at a  8/10 at its worst and a 3/10 currently.  Symptoms are relieved with Aleve somewhat. Symptoms are worsened by stairs and walking. She endorses swelling, popping, grinding and feelings of instability.   She denies bruising, catching and locking.  Other treatment has included steroid injection (left knee), gel injection (right knee) and topicals. She would like to do steroid injections in both knees today.      She is retired. She has a 6 month old puppy.    Review of Systems:  Musculoskeletal: as above  Remainder of review of systems is negative including constitutional, eyes, ENT, CV, pulmonary, GI, , endocrine, skin, hematologic, and neurologic except as noted in HPI or medical history.    History reviewed. No pertinent past surgical/medical/family/social history other than as mentioned in HPI.    Patient Active Problem List   Diagnosis     Generalized osteoarthrosis, unspecified site     Arthropathy     Hypertension goal BP (blood pressure) < 140/90     Migraine headache     Hyperlipidemia LDL goal <130     Knee pain     Back pain     Neck pain     Arthritis of hand, degenerative     GERD (gastroesophageal reflux disease)     Health Care Home     Advanced directives, counseling/discussion     Depression, major, in remission (H)     Thyroid nodule     Osteopenia     Early menopause     Past Medical History:   Diagnosis Date     Cervicalgia       Depressive disorder      Generalized osteoarthrosis, unspecified site      Migraine, unspecified, without mention of intractable migraine without mention of status migrainosus      Thyroid nodule 12/1/2016     Unspecified essential hypertension      Past Surgical History:   Procedure Laterality Date     HYSTERECTOMY, TANG       ORTHOPEDIC SURGERY       SOFT TISSUE SURGERY       Family History   Problem Relation Age of Onset     Arthritis Mother      Lipids Mother      Hypertension Mother      Breast Cancer Mother      Anesthesia Reaction Mother      Heart Disease Father      Cerebrovascular Disease Father      Arthritis Maternal Grandmother      Breast Cancer Maternal Aunt      Breast Cancer Maternal Aunt      Diabetes No family hx of      Coronary Artery Disease No family hx of      Hyperlipidemia No family hx of      Prostate Cancer No family hx of      Other Cancer No family hx of      Anxiety Disorder No family hx of      Mental Illness No family hx of      Substance Abuse No family hx of      Social History     Socioeconomic History     Marital status:      Spouse name: Not on file     Number of children: Not on file     Years of education: Not on file     Highest education level: Not on file   Occupational History     Not on file   Social Needs     Financial resource strain: Not on file     Food insecurity     Worry: Not on file     Inability: Not on file     Transportation needs     Medical: Not on file     Non-medical: Not on file   Tobacco Use     Smoking status: Never Smoker     Smokeless tobacco: Never Used   Substance and Sexual Activity     Alcohol use: Yes     Comment: 5 drinks per week / wine      Drug use: No     Sexual activity: Yes     Partners: Male     Birth control/protection: Surgical, Female Surgical     Comment: not currenlty/hysterectomy   Lifestyle     Physical activity     Days per week: Not on file     Minutes per session: Not on file     Stress: Not on file   Relationships      "Social connections     Talks on phone: Not on file     Gets together: Not on file     Attends Buddhism service: Not on file     Active member of club or organization: Not on file     Attends meetings of clubs or organizations: Not on file     Relationship status: Not on file     Intimate partner violence     Fear of current or ex partner: Not on file     Emotionally abused: Not on file     Physically abused: Not on file     Forced sexual activity: Not on file   Other Topics Concern     Parent/sibling w/ CABG, MI or angioplasty before 65F 55M? No   Social History Narrative     Not on file           Current Outpatient Medications   Medication     acyclovir (ZOVIRAX) 400 MG tablet     atorvastatin (LIPITOR) 40 MG tablet     Black Pepper-Turmeric (TURMERIC COMPLEX/BLACK PEPPER PO)     CALCIUM-VITAMIN D PO     chlorthalidone (HYGROTON) 25 MG tablet     FLUoxetine (PROZAC) 40 MG capsule     gabapentin (NEURONTIN) 100 MG capsule     MAGNESIUM PO     omeprazole (PRILOSEC) 20 MG DR capsule     SUMAtriptan (IMITREX) 50 MG tablet     tiZANidine (ZANAFLEX) 2 MG tablet     No current facility-administered medications for this visit.      Allergies   Allergen Reactions     Tylenol W/Codeine [Acetaminophen-Codeine] Itching     codeine     Percocet [Oxycodone-Acetaminophen] Rash     Sulfa Drugs Rash         Objective:  BP (!) 154/72   Ht 1.577 m (5' 2.09\")   Wt 69.1 kg (152 lb 4.8 oz)   BMI 27.78 kg/m      General: Alert and in no distress    Head: Normocephalic, atraumatic  Eyes: no scleral icterus or conjunctival erythema   Skin: no erythema, petechiae, or jaundice  CV: regular rhythm by palpation, 2+ distal pulses  Resp: normal respiratory effort without conversational dyspnea   Psych: normal mood and affect    Gait: Non-antalgic, appropriate coordination and balance   Neuro: Motor strength and sensation as noted below    Musculoskeletal:    Bilateral Knee exam    Inspection:  No erythema, ecchymosis, warmth, or " edema    Palpation: Tender over the right inferomedial knee.  No tenderness over the left knee.    Knee ROM: Full seated active knee extension and flexion bilaterally    Strength:    Left:  5/5 hip flexion/abduction/adduction, 5/5 knee extension/flexion, 5/5 ankle dorsiflexion/plantarflexion  Right:  5/5 hip flexion/abduction/adduction, 5/5 knee extension/flexion, 5/5 ankle dorsiflexion/plantarflexion    Sensation:  Intact to light touch in the bilateral lower extremities      Radiology:  X-rays ordered and independent visualization of images performed and reviewed with Nicole.    XR KNEE BILATERAL 3 VW 12/7/2020 2:00 PM      HISTORY: Pain in both knees, unspecified chronicity; Pain in both  knees, unspecified chronicity                                                                      IMPRESSION: Bilateral medial compartment and patellofemoral lateral  facet moderate joint space narrowing. Nonspecific bilateral joint  effusions. Left knee posterior articular bodies.     WILL PAULA MD    Large Joint Injection/Arthocentesis: bilateral knee    Date/Time: 12/7/2020 2:22 PM  Performed by: Nessa Esposito MD  Authorized by: Nessa Esposito MD     Indications:  Pain  Needle Size:  25 G  Guidance: landmark guided    Approach:  Anterolateral  Location:  Knee  Laterality:  Bilateral      Medications (Right):  40 mg triamcinolone 40 MG/ML   Medications (Right) comment:  4ml 0.5% bupivicaine  NDC:36554-590-06  Lot: [id number]  8/31/21        Medications (Left):  40 mg triamcinolone 40 MG/ML   Medications (Left) comment:  4ml 0.5% bupivicaine  NDC:17793-249-40  Lot: [id number]  8/31/21        Outcome:  Tolerated well, no immediate complications  Procedure discussed: discussed risks, benefits, and alternatives    Consent Given by:  Patient          Assessment:  1. Bilateral primary osteoarthritis of knee    2. Chronic pain of both knees        Plan:  Discussed the assessment with the patient and  developed a plan together:  -Steroid injections performed today.  Take it easy over the next few days. Keep in mind that the steroid may take up to 3 days to start working and up to 2 weeks to reach maximal effect.    -Ice for 15-20 minutes as needed for soreness and swelling.  -Patient's preferred over the counter medications as needed for soreness.  -Recommend low impact cardiovascular exercises (i.e. walking, using the elliptical, stationary biking, water aerobics, swimming)    -Also discussed physical therapy and bracing    -Nicole to follow up with primary care provider regarding elevated blood pressure.    -Follow up as needed if symptoms fail to improve or worsen.  Please call with questions or concerns.        Taisah Esposito MD, CAQ Sports Medicine  [facility]

## 2020-12-07 ENCOUNTER — OFFICE VISIT (OUTPATIENT)
Dept: ORTHOPEDICS | Facility: OTHER | Age: 75
End: 2020-12-07
Payer: COMMERCIAL

## 2020-12-07 ENCOUNTER — ANCILLARY PROCEDURE (OUTPATIENT)
Dept: GENERAL RADIOLOGY | Facility: OTHER | Age: 75
End: 2020-12-07
Attending: PHYSICAL MEDICINE & REHABILITATION
Payer: COMMERCIAL

## 2020-12-07 VITALS
BODY MASS INDEX: 28.03 KG/M2 | WEIGHT: 152.3 LBS | HEIGHT: 62 IN | DIASTOLIC BLOOD PRESSURE: 72 MMHG | SYSTOLIC BLOOD PRESSURE: 154 MMHG

## 2020-12-07 DIAGNOSIS — M25.562 CHRONIC PAIN OF BOTH KNEES: ICD-10-CM

## 2020-12-07 DIAGNOSIS — M25.562 PAIN IN BOTH KNEES, UNSPECIFIED CHRONICITY: ICD-10-CM

## 2020-12-07 DIAGNOSIS — G89.29 CHRONIC PAIN OF BOTH KNEES: ICD-10-CM

## 2020-12-07 DIAGNOSIS — M25.561 PAIN IN BOTH KNEES, UNSPECIFIED CHRONICITY: ICD-10-CM

## 2020-12-07 DIAGNOSIS — M17.0 BILATERAL PRIMARY OSTEOARTHRITIS OF KNEE: Primary | ICD-10-CM

## 2020-12-07 DIAGNOSIS — M25.561 CHRONIC PAIN OF BOTH KNEES: ICD-10-CM

## 2020-12-07 PROCEDURE — 99204 OFFICE O/P NEW MOD 45 MIN: CPT | Mod: 25 | Performed by: PHYSICAL MEDICINE & REHABILITATION

## 2020-12-07 PROCEDURE — 20610 DRAIN/INJ JOINT/BURSA W/O US: CPT | Mod: 50 | Performed by: PHYSICAL MEDICINE & REHABILITATION

## 2020-12-07 PROCEDURE — 73562 X-RAY EXAM OF KNEE 3: CPT | Mod: 59 | Performed by: RADIOLOGY

## 2020-12-07 RX ORDER — TRIAMCINOLONE ACETONIDE 40 MG/ML
40 INJECTION, SUSPENSION INTRA-ARTICULAR; INTRAMUSCULAR
Status: DISCONTINUED | OUTPATIENT
Start: 2020-12-07 | End: 2022-09-27

## 2020-12-07 RX ADMIN — TRIAMCINOLONE ACETONIDE 40 MG: 40 INJECTION, SUSPENSION INTRA-ARTICULAR; INTRAMUSCULAR at 14:22

## 2020-12-07 ASSESSMENT — MIFFLIN-ST. JEOR: SCORE: 1150.51

## 2020-12-07 NOTE — PATIENT INSTRUCTIONS
-Steroid injections performed today.  Take it easy over the next few days. Keep in mind that the steroid may take up to 3 days to start working and up to 2 weeks to reach maximal effect.    -Ice for 15-20 minutes as needed for soreness and swelling.  -Patient's preferred over the counter medications as needed for soreness.  -Recommend low impact cardiovascular exercises (i.e. walking, using the elliptical, stationary biking, water aerobics, swimming)    -Also discussed physical therapy and bracing    -Nicole to follow up with Primary Care provider regarding elevated blood pressure.    -Follow up as needed if symptoms fail to improve or worsen.  Please call with questions or concerns.

## 2020-12-07 NOTE — LETTER
12/7/2020         RE: Nicole Slaughter  [address] [place] [address]        Dear Colleague,    Thank you for referring your patient, Nicole Slaughter, to the [facility] [place]. Please see a copy of my visit note below.    Sports Medicine Clinic Visit    PCP: Lizett Parikh    CC: Patient presents with:  Left Knee - Pain  Right Knee - Pain      HPI:  Nicole Slaughter is a 75 year old female who is seen in consultation at the request of Dr. Parikh.   She notes bilateral knee pain. She notes the left knee has been bothersome in the past. She was told that she had arthritis in the left knee. She notes she had twisted the right knee a couple of years ago and she has a lot of pain with walking. She has had a gel injection in the left knee and it did not help that knee.  She rates the pain at a  8/10 at its worst and a 3/10 currently.  Symptoms are relieved with Aleve somewhat. Symptoms are worsened by stairs and walking. She endorses swelling, popping, grinding and feelings of instability.   She denies bruising, catching and locking.  Other treatment has included steroid injection (left knee), gel injection (right knee) and topicals. She would like to do steroid injections in both knees today.      She is retired. She has a 6 month old puppy.    Review of Systems:  Musculoskeletal: as above  Remainder of review of systems is negative including constitutional, eyes, ENT, CV, pulmonary, GI, , endocrine, skin, hematologic, and neurologic except as noted in HPI or medical history.    History reviewed. No pertinent past surgical/medical/family/social history other than as mentioned in HPI.    Patient Active Problem List   Diagnosis     Generalized osteoarthrosis, unspecified site     Arthropathy     Hypertension goal BP (blood pressure) < 140/90     Migraine headache     Hyperlipidemia LDL goal <130     Knee pain     Back pain     Neck pain     Arthritis of hand,  degenerative     GERD (gastroesophageal reflux disease)     Health Care Home     Advanced directives, counseling/discussion     Depression, major, in remission (H)     Thyroid nodule     Osteopenia     Early menopause     Past Medical History:   Diagnosis Date     Cervicalgia      Depressive disorder      Generalized osteoarthrosis, unspecified site      Migraine, unspecified, without mention of intractable migraine without mention of status migrainosus      Thyroid nodule 12/1/2016     Unspecified essential hypertension      Past Surgical History:   Procedure Laterality Date     HYSTERECTOMY, TANG       ORTHOPEDIC SURGERY       SOFT TISSUE SURGERY       Family History   Problem Relation Age of Onset     Arthritis Mother      Lipids Mother      Hypertension Mother      Breast Cancer Mother      Anesthesia Reaction Mother      Heart Disease Father      Cerebrovascular Disease Father      Arthritis Maternal Grandmother      Breast Cancer Maternal Aunt      Breast Cancer Maternal Aunt      Diabetes No family hx of      Coronary Artery Disease No family hx of      Hyperlipidemia No family hx of      Prostate Cancer No family hx of      Other Cancer No family hx of      Anxiety Disorder No family hx of      Mental Illness No family hx of      Substance Abuse No family hx of      Social History     Socioeconomic History     Marital status:      Spouse name: Not on file     Number of children: Not on file     Years of education: Not on file     Highest education level: Not on file   Occupational History     Not on file   Social Needs     Financial resource strain: Not on file     Food insecurity     Worry: Not on file     Inability: Not on file     Transportation needs     Medical: Not on file     Non-medical: Not on file   Tobacco Use     Smoking status: Never Smoker     Smokeless tobacco: Never Used   Substance and Sexual Activity     Alcohol use: Yes     Comment: 5 drinks per week / wine      Drug use: No      "Sexual activity: Yes     Partners: Male     Birth control/protection: Surgical, Female Surgical     Comment: not currenlty/hysterectomy   Lifestyle     Physical activity     Days per week: Not on file     Minutes per session: Not on file     Stress: Not on file   Relationships     Social connections     Talks on phone: Not on file     Gets together: Not on file     Attends Rastafarian service: Not on file     Active member of club or organization: Not on file     Attends meetings of clubs or organizations: Not on file     Relationship status: Not on file     Intimate partner violence     Fear of current or ex partner: Not on file     Emotionally abused: Not on file     Physically abused: Not on file     Forced sexual activity: Not on file   Other Topics Concern     Parent/sibling w/ CABG, MI or angioplasty before 65F 55M? No   Social History Narrative     Not on file           Current Outpatient Medications   Medication     acyclovir (ZOVIRAX) 400 MG tablet     atorvastatin (LIPITOR) 40 MG tablet     Black Pepper-Turmeric (TURMERIC COMPLEX/BLACK PEPPER PO)     CALCIUM-VITAMIN D PO     chlorthalidone (HYGROTON) 25 MG tablet     FLUoxetine (PROZAC) 40 MG capsule     gabapentin (NEURONTIN) 100 MG capsule     MAGNESIUM PO     omeprazole (PRILOSEC) 20 MG DR capsule     SUMAtriptan (IMITREX) 50 MG tablet     tiZANidine (ZANAFLEX) 2 MG tablet     No current facility-administered medications for this visit.      Allergies   Allergen Reactions     Tylenol W/Codeine [Acetaminophen-Codeine] Itching     codeine     Percocet [Oxycodone-Acetaminophen] Rash     Sulfa Drugs Rash         Objective:  BP (!) 154/72   Ht 1.577 m (5' 2.09\")   Wt 69.1 kg (152 lb 4.8 oz)   BMI 27.78 kg/m      General: Alert and in no distress    Head: Normocephalic, atraumatic  Eyes: no scleral icterus or conjunctival erythema   Skin: no erythema, petechiae, or jaundice  CV: regular rhythm by palpation, 2+ distal pulses  Resp: normal respiratory effort " without conversational dyspnea   Psych: normal mood and affect    Gait: Non-antalgic, appropriate coordination and balance   Neuro: Motor strength and sensation as noted below    Musculoskeletal:    Bilateral Knee exam    Inspection:  No erythema, ecchymosis, warmth, or edema    Palpation: Tender over the right inferomedial knee.  No tenderness over the left knee.    Knee ROM: Full seated active knee extension and flexion bilaterally    Strength:    Left:  5/5 hip flexion/abduction/adduction, 5/5 knee extension/flexion, 5/5 ankle dorsiflexion/plantarflexion  Right:  5/5 hip flexion/abduction/adduction, 5/5 knee extension/flexion, 5/5 ankle dorsiflexion/plantarflexion    Sensation:  Intact to light touch in the bilateral lower extremities      Radiology:  X-rays ordered and independent visualization of images performed and reviewed with Nicole.    XR KNEE BILATERAL 3 VW 12/7/2020 2:00 PM      HISTORY: Pain in both knees, unspecified chronicity; Pain in both  knees, unspecified chronicity                                                                      IMPRESSION: Bilateral medial compartment and patellofemoral lateral  facet moderate joint space narrowing. Nonspecific bilateral joint  effusions. Left knee posterior articular bodies.     WILL PAULA MD    Large Joint Injection/Arthocentesis: bilateral knee    Date/Time: 12/7/2020 2:22 PM  Performed by: Nessa Esposito MD  Authorized by: Nessa Esposito MD     Indications:  Pain  Needle Size:  25 G  Guidance: landmark guided    Approach:  Anterolateral  Location:  Knee  Laterality:  Bilateral      Medications (Right):  40 mg triamcinolone 40 MG/ML   Medications (Right) comment:  4ml 0.5% bupivicaine  NDC:26173-690-20  Lot: [id number]  8/31/21        Medications (Left):  40 mg triamcinolone 40 MG/ML   Medications (Left) comment:  4ml 0.5% bupivicaine  NDC:25074-637-94  Lot: [id number]  8/31/21        Outcome:  Tolerated well, no immediate  complications  Procedure discussed: discussed risks, benefits, and alternatives    Consent Given by:  Patient          Assessment:  1. Bilateral primary osteoarthritis of knee    2. Chronic pain of both knees        Plan:  Discussed the assessment with the patient and developed a plan together:  -Steroid injections performed today.  Take it easy over the next few days. Keep in mind that the steroid may take up to 3 days to start working and up to 2 weeks to reach maximal effect.    -Ice for 15-20 minutes as needed for soreness and swelling.  -Patient's preferred over the counter medications as needed for soreness.  -Recommend low impact cardiovascular exercises (i.e. walking, using the elliptical, stationary biking, water aerobics, swimming)    -Also discussed physical therapy and bracing    -Nicole to follow up with primary care provider regarding elevated blood pressure.    -Follow up as needed if symptoms fail to improve or worsen.  Please call with questions or concerns.        Taisha Esposito MD, [facility]  [facility]      Again, thank you for allowing me to participate in the care of your patient.        Sincerely,        Nessa Esposito MD

## 2021-01-26 DIAGNOSIS — F32.5 DEPRESSION, MAJOR, IN REMISSION (H): ICD-10-CM

## 2021-01-28 RX ORDER — FLUOXETINE 40 MG/1
CAPSULE ORAL
Qty: 90 CAPSULE | Refills: 0 | Status: SHIPPED | OUTPATIENT
Start: 2021-01-28 | End: 2021-11-18

## 2021-01-28 NOTE — TELEPHONE ENCOUNTER
Routing refill request to provider for review/approval because:  Labs out of range:  PHQ-9  PHQ 8/13/2019 4/27/2020 12/1/2020   PHQ-9 Total Score 4 8 13   Q9: Thoughts of better off dead/self-harm past 2 weeks Not at all Not at all Not at all     Last Written Prescription Date:  12/3/2020  Last Fill Quantity: 30,  # refills: 1   Last office visit: 12/1/2020 with prescribing provider:     Future Office Visit:      SOHAIL RaymundoN, RN

## 2021-01-29 NOTE — TELEPHONE ENCOUNTER
Pt has not read [facility]. Please call to schedule.    Faby Ellsworth CMA ([facility])

## 2021-03-08 ENCOUNTER — IMMUNIZATION (OUTPATIENT)
Dept: PEDIATRICS | Facility: CLINIC | Age: 76
End: 2021-03-08
Payer: COMMERCIAL

## 2021-03-08 PROCEDURE — 91300 PR COVID VAC PFIZER DIL RECON 30 MCG/0.3 ML IM: CPT

## 2021-03-08 PROCEDURE — 0001A PR COVID VAC PFIZER DIL RECON 30 MCG/0.3 ML IM: CPT

## 2021-03-29 ENCOUNTER — IMMUNIZATION (OUTPATIENT)
Dept: PEDIATRICS | Facility: CLINIC | Age: 76
End: 2021-03-29
Attending: INTERNAL MEDICINE
Payer: COMMERCIAL

## 2021-03-29 ENCOUNTER — OFFICE VISIT (OUTPATIENT)
Dept: ORTHOPEDICS | Facility: OTHER | Age: 76
End: 2021-03-29
Payer: COMMERCIAL

## 2021-03-29 VITALS — BODY MASS INDEX: 26.99 KG/M2 | SYSTOLIC BLOOD PRESSURE: 122 MMHG | WEIGHT: 148 LBS | DIASTOLIC BLOOD PRESSURE: 86 MMHG

## 2021-03-29 DIAGNOSIS — M25.561 CHRONIC PAIN OF BOTH KNEES: Primary | ICD-10-CM

## 2021-03-29 DIAGNOSIS — M25.562 CHRONIC PAIN OF BOTH KNEES: Primary | ICD-10-CM

## 2021-03-29 DIAGNOSIS — G89.29 CHRONIC PAIN OF BOTH KNEES: Primary | ICD-10-CM

## 2021-03-29 DIAGNOSIS — M17.0 BILATERAL PRIMARY OSTEOARTHRITIS OF KNEE: ICD-10-CM

## 2021-03-29 PROCEDURE — 91300 PR COVID VAC PFIZER DIL RECON 30 MCG/0.3 ML IM: CPT

## 2021-03-29 PROCEDURE — 99214 OFFICE O/P EST MOD 30 MIN: CPT | Performed by: PHYSICAL MEDICINE & REHABILITATION

## 2021-03-29 PROCEDURE — 0002A PR COVID VAC PFIZER DIL RECON 30 MCG/0.3 ML IM: CPT

## 2021-03-29 RX ORDER — TRAMADOL HYDROCHLORIDE 50 MG/1
50 TABLET ORAL EVERY 6 HOURS PRN
Qty: 14 TABLET | Refills: 0 | Status: SHIPPED | OUTPATIENT
Start: 2021-03-29 | End: 2021-11-18 | Stop reason: ALTCHOICE

## 2021-03-29 NOTE — LETTER
3/29/2021         RE: Nicole Slaughter  [address] [place] [address]        Dear Colleague,    Thank you for referring your patient, Nicole Slaughter, to the [facility] [place]. Please see a copy of my visit note below.    Sports Medicine Clinic Visit       PCP: Lizett Parikh    Nicole Slaughter is a 76 year old female who is seen in follow up for bilateral knee pain. Since last visit on 12/7/2020, Nicole has noted 2 months of relief from steroid injections. Notes that if she is on her legs too long, she is unable to bend her knees to get back into the car (for example, after shopping).  Pain is over the medial and lateral joint line of both knees. Pain is keeping her awake at night. Takes Aleve or Advil but not helping with pain. Helps a little with the swelling. Has been using topical pain relief creams. She rates the pain at a 5/10 currently but gets up to a 10/10.      Second Covid vaccine was this morning but would like to discuss any stronger pain medications.    She is retired.      History reviewed. No pertinent past surgical/medical/family/social history other than as mentioned in HPI.    Patient Active Problem List   Diagnosis     Generalized osteoarthrosis, unspecified site     Arthropathy     Hypertension goal BP (blood pressure) < 140/90     Migraine headache     Hyperlipidemia LDL goal <130     Knee pain     Back pain     Neck pain     Arthritis of hand, degenerative     GERD (gastroesophageal reflux disease)     Health Care Home     Advanced directives, counseling/discussion     Depression, major, in remission (H)     Thyroid nodule     Osteopenia     Early menopause     Past Medical History:   Diagnosis Date     Cervicalgia      Depressive disorder      Generalized osteoarthrosis, unspecified site      Migraine, unspecified, without mention of intractable migraine without mention of status migrainosus      Thyroid nodule 12/1/2016     Unspecified  essential hypertension      Past Surgical History:   Procedure Laterality Date     HYSTERECTOMY, TANG       ORTHOPEDIC SURGERY       SOFT TISSUE SURGERY       Family History   Problem Relation Age of Onset     Arthritis Mother      Lipids Mother      Hypertension Mother      Breast Cancer Mother      Anesthesia Reaction Mother      Heart Disease Father      Cerebrovascular Disease Father      Arthritis Maternal Grandmother      Breast Cancer Maternal Aunt      Breast Cancer Maternal Aunt      Diabetes No family hx of      Coronary Artery Disease No family hx of      Hyperlipidemia No family hx of      Prostate Cancer No family hx of      Other Cancer No family hx of      Anxiety Disorder No family hx of      Mental Illness No family hx of      Substance Abuse No family hx of      Social History     Socioeconomic History     Marital status:      Spouse name: Not on file     Number of children: Not on file     Years of education: Not on file     Highest education level: Not on file   Occupational History     Not on file   Social Needs     Financial resource strain: Not on file     Food insecurity     Worry: Not on file     Inability: Not on file     Transportation needs     Medical: Not on file     Non-medical: Not on file   Tobacco Use     Smoking status: Never Smoker     Smokeless tobacco: Never Used   Substance and Sexual Activity     Alcohol use: Yes     Comment: 5 drinks per week / wine      Drug use: No     Sexual activity: Yes     Partners: Male     Birth control/protection: Surgical, Female Surgical     Comment: not currenlty/hysterectomy   Lifestyle     Physical activity     Days per week: Not on file     Minutes per session: Not on file     Stress: Not on file   Relationships     Social connections     Talks on phone: Not on file     Gets together: Not on file     Attends Hoahaoism service: Not on file     Active member of club or organization: Not on file     Attends meetings of clubs or  organizations: Not on file     Relationship status: Not on file     Intimate partner violence     Fear of current or ex partner: Not on file     Emotionally abused: Not on file     Physically abused: Not on file     Forced sexual activity: Not on file   Other Topics Concern     Parent/sibling w/ CABG, MI or angioplasty before 65F 55M? No   Social History Narrative     Not on file         Current Outpatient Medications   Medication     traMADol (ULTRAM) 50 MG tablet     acyclovir (ZOVIRAX) 400 MG tablet     atorvastatin (LIPITOR) 40 MG tablet     Black Pepper-Turmeric (TURMERIC COMPLEX/BLACK PEPPER PO)     CALCIUM-VITAMIN D PO     chlorthalidone (HYGROTON) 25 MG tablet     FLUoxetine (PROZAC) 40 MG capsule     gabapentin (NEURONTIN) 100 MG capsule     MAGNESIUM PO     omeprazole (PRILOSEC) 20 MG DR capsule     SUMAtriptan (IMITREX) 50 MG tablet     tiZANidine (ZANAFLEX) 2 MG tablet     Current Facility-Administered Medications   Medication     triamcinolone (KENALOG-40) injection 40 mg     triamcinolone (KENALOG-40) injection 40 mg     Allergies   Allergen Reactions     Tylenol W/Codeine [Acetaminophen-Codeine] Itching     codeine     Percocet [Oxycodone-Acetaminophen] Rash     Sulfa Drugs Rash         Objective:  /86 (BP Location: Right arm, Patient Position: Sitting, Cuff Size: Adult Regular)   Wt 67.1 kg (148 lb)   BMI 26.99 kg/m      General: Alert and in no distress    Head: Normocephalic, atraumatic  Eyes: no scleral icterus or conjunctival erythema   Skin: no erythema, petechiae, or jaundice  Resp: normal respiratory effort without conversational dyspnea   Psych: Tearful at times    Gait: Non-antalgic, appropriate coordination and balance     Musculoskeletal:  -Tender over the left medial and lateral joint lines    Radiology:  Independent visualization of images performed and reviewed with Nicole.  XR KNEE BILATERAL 3 VW 12/7/2020 2:00 PM      HISTORY: Pain in both knees, unspecified chronicity; Pain in  both  knees, unspecified chronicity                                                                      IMPRESSION: Bilateral medial compartment and patellofemoral lateral  facet moderate joint space narrowing. Nonspecific bilateral joint  effusions. Left knee posterior articular bodies.     WILL PAULA MD    Assessment:  1. Chronic pain of both knees    2. Bilateral primary osteoarthritis of knee        Plan:  Discussed the assessment with the patient and developed a plan together:  -Tramadol prescribed.  [place] for severe pain.  Can cause sedation.  Do not take prior to driving.    -Patient's preferred over the counter medication as directed on packaging as needed for pain or soreness.  -Ice 15-20 minutes for pain relief or swelling as needed  -Can return in 2 weeks for steroid injections.    -Follow up as needed if symptoms fail to improve or worsen.  Please call with questions or concerns.        Taisha Esposito MD, [facility]  [facility]          Again, thank you for allowing me to participate in the care of your patient.        Sincerely,        Nessa Esposito MD

## 2021-03-29 NOTE — PROGRESS NOTES
Sports Medicine Clinic Visit       PCP: Lizett Parikh    Nicole Slaughter is a 76 year old female who is seen in follow up for bilateral knee pain. Since last visit on 12/7/2020, Nicole has noted 2 months of relief from steroid injections. Notes that if she is on her legs too long, she is unable to bend her knees to get back into the car (for example, after shopping).  Pain is over the medial and lateral joint line of both knees. Pain is keeping her awake at night. Takes Aleve or Advil but not helping with pain. Helps a little with the swelling. Has been using topical pain relief creams. She rates the pain at a 5/10 currently but gets up to a 10/10.      Second Covid vaccine was this morning but would like to discuss any stronger pain medications.    She is retired.      History reviewed. No pertinent past surgical/medical/family/social history other than as mentioned in HPI.    Patient Active Problem List   Diagnosis     Generalized osteoarthrosis, unspecified site     Arthropathy     Hypertension goal BP (blood pressure) < 140/90     Migraine headache     Hyperlipidemia LDL goal <130     Knee pain     Back pain     Neck pain     Arthritis of hand, degenerative     GERD (gastroesophageal reflux disease)     Health Care Home     Advanced directives, counseling/discussion     Depression, major, in remission (H)     Thyroid nodule     Osteopenia     Early menopause     Past Medical History:   Diagnosis Date     Cervicalgia      Depressive disorder      Generalized osteoarthrosis, unspecified site      Migraine, unspecified, without mention of intractable migraine without mention of status migrainosus      Thyroid nodule 12/1/2016     Unspecified essential hypertension      Past Surgical History:   Procedure Laterality Date     HYSTERECTOMY, TANG       ORTHOPEDIC SURGERY       SOFT TISSUE SURGERY       Family History   Problem Relation Age of Onset     Arthritis Mother      Lipids Mother      Hypertension Mother       Breast Cancer Mother      Anesthesia Reaction Mother      Heart Disease Father      Cerebrovascular Disease Father      Arthritis Maternal Grandmother      Breast Cancer Maternal Aunt      Breast Cancer Maternal Aunt      Diabetes No family hx of      Coronary Artery Disease No family hx of      Hyperlipidemia No family hx of      Prostate Cancer No family hx of      Other Cancer No family hx of      Anxiety Disorder No family hx of      Mental Illness No family hx of      Substance Abuse No family hx of      Social History     Socioeconomic History     Marital status:      Spouse name: Not on file     Number of children: Not on file     Years of education: Not on file     Highest education level: Not on file   Occupational History     Not on file   Social Needs     Financial resource strain: Not on file     Food insecurity     Worry: Not on file     Inability: Not on file     Transportation needs     Medical: Not on file     Non-medical: Not on file   Tobacco Use     Smoking status: Never Smoker     Smokeless tobacco: Never Used   Substance and Sexual Activity     Alcohol use: Yes     Comment: 5 drinks per week / wine      Drug use: No     Sexual activity: Yes     Partners: Male     Birth control/protection: Surgical, Female Surgical     Comment: not currenlty/hysterectomy   Lifestyle     Physical activity     Days per week: Not on file     Minutes per session: Not on file     Stress: Not on file   Relationships     Social connections     Talks on phone: Not on file     Gets together: Not on file     Attends Amish service: Not on file     Active member of club or organization: Not on file     Attends meetings of clubs or organizations: Not on file     Relationship status: Not on file     Intimate partner violence     Fear of current or ex partner: Not on file     Emotionally abused: Not on file     Physically abused: Not on file     Forced sexual activity: Not on file   Other Topics Concern      Parent/sibling w/ CABG, MI or angioplasty before 65F 55M? No   Social History Narrative     Not on file         Current Outpatient Medications   Medication     traMADol (ULTRAM) 50 MG tablet     acyclovir (ZOVIRAX) 400 MG tablet     atorvastatin (LIPITOR) 40 MG tablet     Black Pepper-Turmeric (TURMERIC COMPLEX/BLACK PEPPER PO)     CALCIUM-VITAMIN D PO     chlorthalidone (HYGROTON) 25 MG tablet     FLUoxetine (PROZAC) 40 MG capsule     gabapentin (NEURONTIN) 100 MG capsule     MAGNESIUM PO     omeprazole (PRILOSEC) 20 MG DR capsule     SUMAtriptan (IMITREX) 50 MG tablet     tiZANidine (ZANAFLEX) 2 MG tablet     Current Facility-Administered Medications   Medication     triamcinolone (KENALOG-40) injection 40 mg     triamcinolone (KENALOG-40) injection 40 mg     Allergies   Allergen Reactions     Tylenol W/Codeine [Acetaminophen-Codeine] Itching     codeine     Percocet [Oxycodone-Acetaminophen] Rash     Sulfa Drugs Rash         Objective:  /86 (BP Location: Right arm, Patient Position: Sitting, Cuff Size: Adult Regular)   Wt 67.1 kg (148 lb)   BMI 26.99 kg/m      General: Alert and in no distress    Head: Normocephalic, atraumatic  Eyes: no scleral icterus or conjunctival erythema   Skin: no erythema, petechiae, or jaundice  Resp: normal respiratory effort without conversational dyspnea   Psych: Tearful at times    Gait: Non-antalgic, appropriate coordination and balance     Musculoskeletal:  -Tender over the left medial and lateral joint lines    Radiology:  Independent visualization of images performed and reviewed with Elba  XR KNEE BILATERAL 3 VW 12/7/2020 2:00 PM      HISTORY: Pain in both knees, unspecified chronicity; Pain in both  knees, unspecified chronicity                                                                      IMPRESSION: Bilateral medial compartment and patellofemoral lateral  facet moderate joint space narrowing. Nonspecific bilateral joint  effusions. Left knee posterior  articular bodies.     WILL PAULA MD    Assessment:  1. Chronic pain of both knees    2. Bilateral primary osteoarthritis of knee        Plan:  Discussed the assessment with the patient and developed a plan together:  -Tramadol prescribed.  [place] for severe pain.  Can cause sedation.  Do not take prior to driving.    -Patient's preferred over the counter medication as directed on packaging as needed for pain or soreness.  -Ice 15-20 minutes for pain relief or swelling as needed  -Can return in 2 weeks for steroid injections.    -Follow up as needed if symptoms fail to improve or worsen.  Please call with questions or concerns.        Taisha Esposito MD, [facility] Sports Medicine  [facility]

## 2021-03-31 ENCOUNTER — TELEPHONE (OUTPATIENT)
Dept: FAMILY MEDICINE | Facility: CLINIC | Age: 76
End: 2021-03-31

## 2021-03-31 NOTE — TELEPHONE ENCOUNTER
Patient Quality Outreach Summary      Summary:    Patient is due/failing the following:   Colonoscopy    Type of outreach:    Sent [employer] message.    Questions for provider review:    None                                                                                                                    Josi Ward CMA       Chart routed to Care Team.

## 2021-04-09 NOTE — PROGRESS NOTES
Sports Medicine Clinic Visit       PCP: Lizett Parikh    Nicole Slaughter is a 76 year old female who is seen in follow up for bilateral knee pain. Since last visit on 3/29/2021, Nicole has noted knee pain that increases and decreases daily. Pain is over the posterior knee and medial and lateral knee joint line. She rates the pain at a 4/10 currently but get up to 10/10 at its worst.  Symptoms are minimally relieved with Aleve and topical pain medication.  Symptoms are worsened by flexion, stairs, and walking.     Patient is interested in bilateral knee steroid injections today.  Second COVID vaccination was on 3/29/2021.    She is retired.      History reviewed. No pertinent past surgical/medical/family/social history other than as mentioned in HPI.    Patient Active Problem List   Diagnosis     Generalized osteoarthrosis, unspecified site     Arthropathy     Hypertension goal BP (blood pressure) < 140/90     Migraine headache     Hyperlipidemia LDL goal <130     Knee pain     Back pain     Neck pain     Arthritis of hand, degenerative     GERD (gastroesophageal reflux disease)     Health Care Home     Advanced directives, counseling/discussion     Depression, major, in remission (H)     Thyroid nodule     Osteopenia     Early menopause     Past Medical History:   Diagnosis Date     Cervicalgia      Depressive disorder      Generalized osteoarthrosis, unspecified site      Migraine, unspecified, without mention of intractable migraine without mention of status migrainosus      Thyroid nodule 12/1/2016     Unspecified essential hypertension      Past Surgical History:   Procedure Laterality Date     HYSTERECTOMY, TANG       ORTHOPEDIC SURGERY       SOFT TISSUE SURGERY       Family History   Problem Relation Age of Onset     Arthritis Mother      Lipids Mother      Hypertension Mother      Breast Cancer Mother      Anesthesia Reaction Mother      Heart Disease Father      Cerebrovascular Disease Father       Arthritis Maternal Grandmother      Breast Cancer Maternal Aunt      Breast Cancer Maternal Aunt      Diabetes No family hx of      Coronary Artery Disease No family hx of      Hyperlipidemia No family hx of      Prostate Cancer No family hx of      Other Cancer No family hx of      Anxiety Disorder No family hx of      Mental Illness No family hx of      Substance Abuse No family hx of      Social History     Socioeconomic History     Marital status:      Spouse name: Not on file     Number of children: Not on file     Years of education: Not on file     Highest education level: Not on file   Occupational History     Not on file   Social Needs     Financial resource strain: Not on file     Food insecurity     Worry: Not on file     Inability: Not on file     Transportation needs     Medical: Not on file     Non-medical: Not on file   Tobacco Use     Smoking status: Never Smoker     Smokeless tobacco: Never Used   Substance and Sexual Activity     Alcohol use: Yes     Comment: 5 drinks per week / wine      Drug use: No     Sexual activity: Yes     Partners: Male     Birth control/protection: Surgical, Female Surgical     Comment: not currenlty/hysterectomy   Lifestyle     Physical activity     Days per week: Not on file     Minutes per session: Not on file     Stress: Not on file   Relationships     Social connections     Talks on phone: Not on file     Gets together: Not on file     Attends Mandaeism service: Not on file     Active member of club or organization: Not on file     Attends meetings of clubs or organizations: Not on file     Relationship status: Not on file     Intimate partner violence     Fear of current or ex partner: Not on file     Emotionally abused: Not on file     Physically abused: Not on file     Forced sexual activity: Not on file   Other Topics Concern     Parent/sibling w/ CABG, MI or angioplasty before 65F 55M? No   Social History Narrative     Not on file         Current  "Outpatient Medications   Medication     acyclovir (ZOVIRAX) 400 MG tablet     atorvastatin (LIPITOR) 40 MG tablet     Black Pepper-Turmeric (TURMERIC COMPLEX/BLACK PEPPER PO)     CALCIUM-VITAMIN D PO     chlorthalidone (HYGROTON) 25 MG tablet     FLUoxetine (PROZAC) 40 MG capsule     gabapentin (NEURONTIN) 100 MG capsule     MAGNESIUM PO     omeprazole (PRILOSEC) 20 MG DR capsule     SUMAtriptan (IMITREX) 50 MG tablet     tiZANidine (ZANAFLEX) 2 MG tablet     traMADol (ULTRAM) 50 MG tablet     Current Facility-Administered Medications   Medication     triamcinolone (KENALOG-40) injection 40 mg     triamcinolone (KENALOG-40) injection 40 mg     Allergies   Allergen Reactions     Tylenol W/Codeine [Acetaminophen-Codeine] Itching     codeine     Percocet [Oxycodone-Acetaminophen] Rash     Sulfa Drugs Rash         Objective:  /58 (BP Location: Right arm, Patient Position: Sitting, Cuff Size: Adult Regular)   Ht 1.6 m (5' 3\")   Wt 67.1 kg (148 lb)   BMI 26.22 kg/m      General: Alert and in no distress    Head: Normocephalic, atraumatic  Eyes: no scleral icterus or conjunctival erythema   Skin: no erythema, petechiae, or jaundice  Resp: normal respiratory effort without conversational dyspnea   Psych: normal mood and affect    Musculoskeletal:  -Bilateral knees without bruising or erythema    Radiology:  Independent visualization of images performed.  XR KNEE BILATERAL 3 VW 12/7/2020 2:00 PM      HISTORY: Pain in both knees, unspecified chronicity; Pain in both  knees, unspecified chronicity                                                                      IMPRESSION: Bilateral medial compartment and patellofemoral lateral  facet moderate joint space narrowing. Nonspecific bilateral joint  effusions. Left knee posterior articular bodies.     WILL PAULA MD    Large Joint Injection/Arthocentesis: bilateral knee    Date/Time: 4/12/2021 2:54 PM  Performed by: Nessa Esposito MD  Authorized by: " Nessa Esposito MD     Indications:  Pain  Needle Size:  25 G  Guidance: landmark guided    Approach:  Anterolateral  Location:  Knee  Laterality:  Bilateral      Medications (Right):  40 mg triamcinolone 40 MG/ML   Medications (Right) comment:  4ml 0.5% bupivacaine  NDC:53981-884-84  Lot: [id number]  Ex: 8/31/21    Medications (Left):  40 mg triamcinolone 40 MG/ML   Medications (Left) comment:  4ml 0.5% bupivacaine  NDC:34876-165-52  Lot: [id number]  Ex: 8/31/21    Outcome:  Tolerated well, no immediate complications  Procedure discussed: discussed risks, benefits, and alternatives    Consent Given by:  Patient        Assessment:  1. Chronic pain of both knees    2. Bilateral primary osteoarthritis of knee        Plan:  Discussed the assessment with the patient and developed a plan together:  -Steroid injections performed today.  Take it easy over the next few days. Keep in mind that the steroid may take up to 3 days to start working and up to 2 weeks to reach maximal effect.    -Ice for 15-20 minutes as needed for soreness and swelling.  -Patient's preferred over the counter medications as needed for soreness.    -Follow up as needed if symptoms fail to improve or worsen.  Please call with questions or concerns.        Taisha Esposito MD, [facility]  [facility]

## 2021-04-12 ENCOUNTER — OFFICE VISIT (OUTPATIENT)
Dept: ORTHOPEDICS | Facility: OTHER | Age: 76
End: 2021-04-12
Payer: COMMERCIAL

## 2021-04-12 VITALS
WEIGHT: 148 LBS | HEIGHT: 63 IN | SYSTOLIC BLOOD PRESSURE: 130 MMHG | BODY MASS INDEX: 26.22 KG/M2 | DIASTOLIC BLOOD PRESSURE: 58 MMHG

## 2021-04-12 DIAGNOSIS — M25.561 CHRONIC PAIN OF BOTH KNEES: Primary | ICD-10-CM

## 2021-04-12 DIAGNOSIS — M25.562 CHRONIC PAIN OF BOTH KNEES: Primary | ICD-10-CM

## 2021-04-12 DIAGNOSIS — M17.0 BILATERAL PRIMARY OSTEOARTHRITIS OF KNEE: ICD-10-CM

## 2021-04-12 DIAGNOSIS — G89.29 CHRONIC PAIN OF BOTH KNEES: Primary | ICD-10-CM

## 2021-04-12 PROCEDURE — 99214 OFFICE O/P EST MOD 30 MIN: CPT | Mod: 25 | Performed by: PHYSICAL MEDICINE & REHABILITATION

## 2021-04-12 PROCEDURE — 20610 DRAIN/INJ JOINT/BURSA W/O US: CPT | Mod: 50 | Performed by: PHYSICAL MEDICINE & REHABILITATION

## 2021-04-12 RX ORDER — TRIAMCINOLONE ACETONIDE 40 MG/ML
40 INJECTION, SUSPENSION INTRA-ARTICULAR; INTRAMUSCULAR
Status: DISCONTINUED | OUTPATIENT
Start: 2021-04-12 | End: 2022-09-27

## 2021-04-12 RX ADMIN — TRIAMCINOLONE ACETONIDE 40 MG: 40 INJECTION, SUSPENSION INTRA-ARTICULAR; INTRAMUSCULAR at 14:54

## 2021-04-12 ASSESSMENT — MIFFLIN-ST. JEOR: SCORE: 1140.45

## 2021-04-12 NOTE — LETTER
4/12/2021         RE: Nicole Slaughter  [address] [place] [address]        Dear Colleague,    Thank you for referring your patient, Nicole Slaughter, to the [facility] [place]. Please see a copy of my visit note below.    Sports Medicine Clinic Visit       PCP: Lizett Parikh    Nicole Slaughter is a 76 year old female who is seen in follow up for bilateral knee pain. Since last visit on 3/29/2021, Nicole has noted knee pain that increases and decreases daily. Pain is over the posterior knee and medial and lateral knee joint line. She rates the pain at a 4/10 currently but get up to 10/10 at its worst.  Symptoms are minimally relieved with Aleve and topical pain medication.  Symptoms are worsened by flexion, stairs, and walking.     Patient is interested in bilateral knee steroid injections today.  Second COVID vaccination was on 3/29/2021.    She is retired.      History reviewed. No pertinent past surgical/medical/family/social history other than as mentioned in HPI.    Patient Active Problem List   Diagnosis     Generalized osteoarthrosis, unspecified site     Arthropathy     Hypertension goal BP (blood pressure) < 140/90     Migraine headache     Hyperlipidemia LDL goal <130     Knee pain     Back pain     Neck pain     Arthritis of hand, degenerative     GERD (gastroesophageal reflux disease)     Health Care Home     Advanced directives, counseling/discussion     Depression, major, in remission (H)     Thyroid nodule     Osteopenia     Early menopause     Past Medical History:   Diagnosis Date     Cervicalgia      Depressive disorder      Generalized osteoarthrosis, unspecified site      Migraine, unspecified, without mention of intractable migraine without mention of status migrainosus      Thyroid nodule 12/1/2016     Unspecified essential hypertension      Past Surgical History:   Procedure Laterality Date     HYSTERECTOMY, TANG       ORTHOPEDIC SURGERY        SOFT TISSUE SURGERY       Family History   Problem Relation Age of Onset     Arthritis Mother      Lipids Mother      Hypertension Mother      Breast Cancer Mother      Anesthesia Reaction Mother      Heart Disease Father      Cerebrovascular Disease Father      Arthritis Maternal Grandmother      Breast Cancer Maternal Aunt      Breast Cancer Maternal Aunt      Diabetes No family hx of      Coronary Artery Disease No family hx of      Hyperlipidemia No family hx of      Prostate Cancer No family hx of      Other Cancer No family hx of      Anxiety Disorder No family hx of      Mental Illness No family hx of      Substance Abuse No family hx of      Social History     Socioeconomic History     Marital status:      Spouse name: Not on file     Number of children: Not on file     Years of education: Not on file     Highest education level: Not on file   Occupational History     Not on file   Social Needs     Financial resource strain: Not on file     Food insecurity     Worry: Not on file     Inability: Not on file     Transportation needs     Medical: Not on file     Non-medical: Not on file   Tobacco Use     Smoking status: Never Smoker     Smokeless tobacco: Never Used   Substance and Sexual Activity     Alcohol use: Yes     Comment: 5 drinks per week / wine      Drug use: No     Sexual activity: Yes     Partners: Male     Birth control/protection: Surgical, Female Surgical     Comment: not currenlty/hysterectomy   Lifestyle     Physical activity     Days per week: Not on file     Minutes per session: Not on file     Stress: Not on file   Relationships     Social connections     Talks on phone: Not on file     Gets together: Not on file     Attends Gnosticist service: Not on file     Active member of club or organization: Not on file     Attends meetings of clubs or organizations: Not on file     Relationship status: Not on file     Intimate partner violence     Fear of current or ex partner: Not on file      "Emotionally abused: Not on file     Physically abused: Not on file     Forced sexual activity: Not on file   Other Topics Concern     Parent/sibling w/ CABG, MI or angioplasty before 65F 55M? No   Social History Narrative     Not on file         Current Outpatient Medications   Medication     acyclovir (ZOVIRAX) 400 MG tablet     atorvastatin (LIPITOR) 40 MG tablet     Black Pepper-Turmeric (TURMERIC COMPLEX/BLACK PEPPER PO)     CALCIUM-VITAMIN D PO     chlorthalidone (HYGROTON) 25 MG tablet     FLUoxetine (PROZAC) 40 MG capsule     gabapentin (NEURONTIN) 100 MG capsule     MAGNESIUM PO     omeprazole (PRILOSEC) 20 MG DR capsule     SUMAtriptan (IMITREX) 50 MG tablet     tiZANidine (ZANAFLEX) 2 MG tablet     traMADol (ULTRAM) 50 MG tablet     Current Facility-Administered Medications   Medication     triamcinolone (KENALOG-40) injection 40 mg     triamcinolone (KENALOG-40) injection 40 mg     Allergies   Allergen Reactions     Tylenol W/Codeine [Acetaminophen-Codeine] Itching     codeine     Percocet [Oxycodone-Acetaminophen] Rash     Sulfa Drugs Rash         Objective:  /58 (BP Location: Right arm, Patient Position: Sitting, Cuff Size: Adult Regular)   Ht 1.6 m (5' 3\")   Wt 67.1 kg (148 lb)   BMI 26.22 kg/m      General: Alert and in no distress    Head: Normocephalic, atraumatic  Eyes: no scleral icterus or conjunctival erythema   Skin: no erythema, petechiae, or jaundice  Resp: normal respiratory effort without conversational dyspnea   Psych: normal mood and affect    Musculoskeletal:  -Bilateral knees without bruising or erythema    Radiology:  Independent visualization of images performed.  XR KNEE BILATERAL 3 VW 12/7/2020 2:00 PM      HISTORY: Pain in both knees, unspecified chronicity; Pain in both  knees, unspecified chronicity                                                                      IMPRESSION: Bilateral medial compartment and patellofemoral lateral  facet moderate joint space " narrowing. Nonspecific bilateral joint  effusions. Left knee posterior articular bodies.     WILL PAULA MD    Large Joint Injection/Arthocentesis: bilateral knee    Date/Time: 4/12/2021 2:54 PM  Performed by: Nessa Esposito MD  Authorized by: Nessa Esposito MD     Indications:  Pain  Needle Size:  25 G  Guidance: landmark guided    Approach:  Anterolateral  Location:  Knee  Laterality:  Bilateral      Medications (Right):  40 mg triamcinolone 40 MG/ML   Medications (Right) comment:  4ml 0.5% bupivacaine  NDC:61602-564-29  Lot: [id number]  Ex: 8/31/21    Medications (Left):  40 mg triamcinolone 40 MG/ML   Medications (Left) comment:  4ml 0.5% bupivacaine  NDC:38112-442-24  Lot: [id number]  Ex: 8/31/21    Outcome:  Tolerated well, no immediate complications  Procedure discussed: discussed risks, benefits, and alternatives    Consent Given by:  Patient        Assessment:  1. Chronic pain of both knees    2. Bilateral primary osteoarthritis of knee        Plan:  Discussed the assessment with the patient and developed a plan together:  -Steroid injections performed today.  Take it easy over the next few days. Keep in mind that the steroid may take up to 3 days to start working and up to 2 weeks to reach maximal effect.    -Ice for 15-20 minutes as needed for soreness and swelling.  -Patient's preferred over the counter medications as needed for soreness.    -Follow up as needed if symptoms fail to improve or worsen.  Please call with questions or concerns.        Taisha Esposito MD, [facility]  [facility]          Again, thank you for allowing me to participate in the care of your patient.        Sincerely,        Nessa Esposito MD

## 2021-06-24 DIAGNOSIS — M17.0 BILATERAL PRIMARY OSTEOARTHRITIS OF KNEE: Primary | ICD-10-CM

## 2021-06-24 NOTE — TELEPHONE ENCOUNTER
[facility] Call Center    Phone Message    May a detailed message be left on voicemail: yes     Reason for Call: Medication Refill Request    Has the patient contacted the pharmacy for the refill? Yes   Name of medication being requested: Tramadol  Provider who prescribed the medication: Dr. Esposito  Pharmacy: [facility] in [place]  Date medication is needed: 6/24/2021     Please have someone on Dr. Esposito's care team reach out to this pt when this refill request has been placed.       Action Taken: Message routed to:  Other: [facility] Triage Pool    Travel Screening: Not Applicable

## 2021-06-28 NOTE — TELEPHONE ENCOUNTER
Will decline tramadol refill.  She does have an appointment on 7/12/2021 with Dr. Levi.  I can see her earlier on 6/29/2021.  Patient will be called to inform her of this.  Otherwise can prescribe stronger anti-inflammatory medication not to be combined with Aleve/ibuprofen.  I would plan on possible repeat knee aspiration/steroid injection with consideration of Synvisc injections should this not improve her symptoms.  Can relay plan as above to patient.    Cristhian Raymond MD

## 2021-06-28 NOTE — TELEPHONE ENCOUNTER
Called and spoke with patient. I stated that she was scheduled to see Dr. Levi on 7/12/2021 and that we would be able to get her in in the next few days to discuss the Tramadol refill since Dr. Esposito is currently out of the office. At that time, she declared, with a few choice words, that she will just wait and she does not care anymore before hanging up on me. If she calls back, Dr. Raymond has noted that he is able to prescribe Relafen, per previous note. Please explain the plan from 6/27/2021 note if she were to call back. Estrella Raya, ATC

## 2021-07-12 ENCOUNTER — OFFICE VISIT (OUTPATIENT)
Dept: ORTHOPEDICS | Facility: CLINIC | Age: 76
End: 2021-07-12
Payer: COMMERCIAL

## 2021-07-12 VITALS — HEIGHT: 63 IN | WEIGHT: 148 LBS | BODY MASS INDEX: 26.22 KG/M2

## 2021-07-12 DIAGNOSIS — M17.0 BILATERAL PRIMARY OSTEOARTHRITIS OF KNEE: Primary | ICD-10-CM

## 2021-07-12 PROCEDURE — 20610 DRAIN/INJ JOINT/BURSA W/O US: CPT | Mod: 50 | Performed by: FAMILY MEDICINE

## 2021-07-12 PROCEDURE — 99207 PR DROP WITH A PROCEDURE: CPT | Performed by: FAMILY MEDICINE

## 2021-07-12 RX ORDER — TRIAMCINOLONE ACETONIDE 40 MG/ML
40 INJECTION, SUSPENSION INTRA-ARTICULAR; INTRAMUSCULAR
Status: DISCONTINUED | OUTPATIENT
Start: 2021-07-12 | End: 2022-09-27

## 2021-07-12 RX ADMIN — TRIAMCINOLONE ACETONIDE 40 MG: 40 INJECTION, SUSPENSION INTRA-ARTICULAR; INTRAMUSCULAR at 13:24

## 2021-07-12 ASSESSMENT — MIFFLIN-ST. JEOR: SCORE: 1140.45

## 2021-07-12 NOTE — PROGRESS NOTES
"Nicole is here for bilateral knee injections.  She sees Dr. Esposito, who is out on leave.  She has a history of bilateral knee osteoarthritis.        [facility] FOLLOW-UP VISIT 7/12/2021    Nicole Slaughter's chief complaint for this visit includes:  Chief Complaint   Patient presents with     Procedure     bilateral knee joint cortisone injectins last injection 4/12/21     PCP: Lizett Parikh    Referring Provider:  No referring provider defined for this encounter.    Ht 1.6 m (5' 3\")   Wt 67.1 kg (148 lb)   BMI 26.22 kg/m    Data Unavailable       Interval History:     Follow up reason: bilateral knee injections    Following Therapeutic Plan: Yes     Pain: Worsening    Function: Worsening    Medical History:    Any recent changes to your medical history? No    Any new medication prescribed since last visit? No    Review of Systems:    Do you have fever, chills, weight loss? No    Do you have any vision problems? No    Do you have any chest pain or edema? No    Do you have any shortness of breath or wheezing?  No    Do you have stomach problems? No    Do you have any urinary track issues? No    Do you have any numbness or focal weakness? No    Do you have diabetes? No    Do you have problems with bleeding or clotting? No    Do you have an rashes or other skin lesions? No    Large Joint Injection/Arthocentesis: bilateral knee    Date/Time: 7/12/2021 1:24 PM  Performed by: Cristobal Levi DO  Authorized by: Cristobal Levi DO     Indications:  Pain  Needle Size:  22 G  Guidance: landmark guided    Approach:  Lateral  Location:  Knee  Laterality:  Bilateral      Medications (Right):  40 mg triamcinolone 40 MG/ML  Medications (Left):  40 mg triamcinolone 40 MG/ML  Outcome:  Tolerated well, no immediate complications  Procedure discussed: discussed risks, benefits, and alternatives    Consent Given by:  Patient  Timeout: timeout called immediately prior to procedure    Prep: patient was prepped " and draped in usual sterile fashion       PROCEDURE    Knee Injections - Intraarticular    The patient was informed of the risks and the benefits of the procedure and a written consent was signed.    The patient s left knee was prepped with chlorhexidine in sterile fashion.   40 mg of triamcinolone suspension was drawn up into a 5 mL syringe with 4 mL of 1% lidocaine.  Injection was performed using substerile technique.  A 1.5-inch 22-gauge needle was used to enter the lateral aspect of the left knee.  Injection performed successfully without difficulty.  There were no complications. The patient tolerated the procedure well. There was negligible bleeding.     The patient's right knee was prepped with chlorhexidine in sterile fashion.   40 mg of triamcinolone suspension was drawn up into a 5 mL syringe with 4 mL of 1% lidocaine.  Injection was performed using substerile technique.  A 1.5-inch 22-gauge needle was used to enter the lateral aspect of the right knee.  Injection performed successfully without difficulty.  There were no complications. The patient tolerated the procedure well. There was negligible bleeding.

## 2021-07-12 NOTE — LETTER
"    7/12/2021         RE: Nicole Slaughter  [address] [place] [address]        Dear Colleague,    Thank you for referring your patient, Nicole Slaughter, to the [facility] [place]. Please see a copy of my visit note below.    Nicole is here for bilateral knee injections.  She sees Dr. Esposito, who is out on leave.  She has a history of bilateral knee osteoarthritis.        [facility] FOLLOW-UP VISIT 7/12/2021    Nicole Slaughter's chief complaint for this visit includes:  Chief Complaint   Patient presents with     Procedure     bilateral knee joint cortisone injectins last injection 4/12/21     PCP: Lizett Parikh    Referring Provider:  No referring provider defined for this encounter.    Ht 1.6 m (5' 3\")   Wt 67.1 kg (148 lb)   BMI 26.22 kg/m    Data Unavailable       Interval History:     Follow up reason: bilateral knee injections    Following Therapeutic Plan: Yes     Pain: Worsening    Function: Worsening    Medical History:    Any recent changes to your medical history? No    Any new medication prescribed since last visit? No    Review of Systems:    Do you have fever, chills, weight loss? No    Do you have any vision problems? No    Do you have any chest pain or edema? No    Do you have any shortness of breath or wheezing?  No    Do you have stomach problems? No    Do you have any urinary track issues? No    Do you have any numbness or focal weakness? No    Do you have diabetes? No    Do you have problems with bleeding or clotting? No    Do you have an rashes or other skin lesions? No    Large Joint Injection/Arthocentesis: bilateral knee    Date/Time: 7/12/2021 1:24 PM  Performed by: Cristobal Levi DO  Authorized by: Cristobal Levi DO     Indications:  Pain  Needle Size:  22 G  Guidance: landmark guided    Approach:  Lateral  Location:  Knee  Laterality:  Bilateral      Medications (Right):  40 mg triamcinolone 40 MG/ML  Medications " (Left):  40 mg triamcinolone 40 MG/ML  Outcome:  Tolerated well, no immediate complications  Procedure discussed: discussed risks, benefits, and alternatives    Consent Given by:  Patient  Timeout: timeout called immediately prior to procedure    Prep: patient was prepped and draped in usual sterile fashion       PROCEDURE    Knee Injections - Intraarticular    The patient was informed of the risks and the benefits of the procedure and a written consent was signed.    The patient s left knee was prepped with chlorhexidine in sterile fashion.   40 mg of triamcinolone suspension was drawn up into a 5 mL syringe with 4 mL of 1% lidocaine.  Injection was performed using substerile technique.  A 1.5-inch 22-gauge needle was used to enter the lateral aspect of the left knee.  Injection performed successfully without difficulty.  There were no complications. The patient tolerated the procedure well. There was negligible bleeding.     The patient's right knee was prepped with chlorhexidine in sterile fashion.   40 mg of triamcinolone suspension was drawn up into a 5 mL syringe with 4 mL of 1% lidocaine.  Injection was performed using substerile technique.  A 1.5-inch 22-gauge needle was used to enter the lateral aspect of the right knee.  Injection performed successfully without difficulty.  There were no complications. The patient tolerated the procedure well. There was negligible bleeding.                     Again, thank you for allowing me to participate in the care of your patient.        Sincerely,        Cristobal Levi DO

## 2021-08-10 DIAGNOSIS — M25.561 RIGHT KNEE PAIN, UNSPECIFIED CHRONICITY: ICD-10-CM

## 2021-08-10 NOTE — TELEPHONE ENCOUNTER
Pending Prescriptions:                       Disp   Refills    tiZANidine (ZANAFLEX) 2 MG tablet [Pharmac*90 tab*0        Sig: Take 1 tablet (2 mg) by mouth 2 times daily as needed           for muscle spasms    Routing refill request to provider for review/approval because:  Drug not on the [facility] refill protocol

## 2021-08-12 RX ORDER — TIZANIDINE 2 MG/1
2 TABLET ORAL 2 TIMES DAILY PRN
Qty: 90 TABLET | Refills: 0 | Status: SHIPPED | OUTPATIENT
Start: 2021-08-12 | End: 2022-04-20

## 2021-10-24 ENCOUNTER — HEALTH MAINTENANCE LETTER (OUTPATIENT)
Age: 76
End: 2021-10-24

## 2021-11-08 ENCOUNTER — TELEPHONE (OUTPATIENT)
Dept: ORTHOPEDICS | Facility: OTHER | Age: 76
End: 2021-11-08
Payer: COMMERCIAL

## 2021-11-08 DIAGNOSIS — M17.0 BILATERAL PRIMARY OSTEOARTHRITIS OF KNEE: Primary | ICD-10-CM

## 2021-11-08 NOTE — TELEPHONE ENCOUNTER
Patient scheduled for appointment on 11/18/2021 for discussion of viscosupplementation injection vs steroid injection of bilateral knee.      Steroid  injection last completed 7/12/2021.       Prior authorization referral for SynviscOne injection pended.     Please advise.

## 2021-11-08 NOTE — PROGRESS NOTES
Sports Medicine Clinic Visit       PCP: Lizett Parikh    Nicole Slaughter is a 76 year old female who is seen in follow up for bilateral knee pain. Since last visit on 4/12/2021, Nicole has noticed an increase in knee pain. Her right knee is starting to feel unstable.  Pain is over the medial and lateral joint line of her right knee and her generalized left knee. She rates the pain at a 2/10 currently.  Symptoms are relieved with corticosteroid injections, Aleve, IcyHot and CBD tincture and ointment in the past.  Symptoms are worsened by getting into the car after grocery store, extreme flexion. She had bilateral corticosteroid injections with Dr. Cristobal Levi on 7/12/2021 that provided 2 months of relief.    She is retired.      History reviewed. No pertinent past surgical/medical/family/social history other than as mentioned in HPI.    Patient Active Problem List   Diagnosis     Generalized osteoarthrosis, unspecified site     Arthropathy     Hypertension goal BP (blood pressure) < 140/90     Migraine headache     Hyperlipidemia LDL goal <130     Knee pain     Back pain     Neck pain     Arthritis of hand, degenerative     GERD (gastroesophageal reflux disease)     Health Care Home     Advanced directives, counseling/discussion     Depression, major, in remission (H)     Thyroid nodule     Osteopenia     Early menopause     Past Medical History:   Diagnosis Date     Cervicalgia      Depressive disorder      Generalized osteoarthrosis, unspecified site      Migraine, unspecified, without mention of intractable migraine without mention of status migrainosus      Thyroid nodule 12/1/2016     Unspecified essential hypertension      Past Surgical History:   Procedure Laterality Date     HYSTERECTOMY, TANG       ORTHOPEDIC SURGERY       SOFT TISSUE SURGERY       Family History   Problem Relation Age of Onset     Arthritis Mother      Lipids Mother      Hypertension Mother      Breast Cancer Mother      Anesthesia  Reaction Mother      Heart Disease Father      Cerebrovascular Disease Father      Arthritis Maternal Grandmother      Breast Cancer Maternal Aunt      Breast Cancer Maternal Aunt      Diabetes No family hx of      Coronary Artery Disease No family hx of      Hyperlipidemia No family hx of      Prostate Cancer No family hx of      Other Cancer No family hx of      Anxiety Disorder No family hx of      Mental Illness No family hx of      Substance Abuse No family hx of      Social History     Socioeconomic History     Marital status:      Spouse name: Not on file     Number of children: Not on file     Years of education: Not on file     Highest education level: Not on file   Occupational History     Not on file   Tobacco Use     Smoking status: Never Smoker     Smokeless tobacco: Never Used   Substance and Sexual Activity     Alcohol use: Yes     Comment: 5 drinks per week / wine      Drug use: No     Sexual activity: Yes     Partners: Male     Birth control/protection: Surgical, Female Surgical     Comment: not currenlty/hysterectomy   Other Topics Concern     Parent/sibling w/ CABG, MI or angioplasty before 65F 55M? No   Social History Narrative     Not on file     Social Determinants of Health     Financial Resource Strain: Not on file   Food Insecurity: Not on file   Transportation Needs: Not on file   Physical Activity: Not on file   Stress: Not on file   Social Connections: Not on file   Intimate Partner Violence: Not on file   Housing Stability: Not on file         Current Outpatient Medications   Medication     acyclovir (ZOVIRAX) 400 MG tablet     atorvastatin (LIPITOR) 40 MG tablet     CALCIUM-VITAMIN D PO     chlorthalidone (HYGROTON) 25 MG tablet     gabapentin (NEURONTIN) 100 MG capsule     MAGNESIUM PO     omeprazole (PRILOSEC) 20 MG DR capsule     SUMAtriptan (IMITREX) 50 MG tablet     tiZANidine (ZANAFLEX) 2 MG tablet     Current Facility-Administered Medications   Medication     triamcinolone  (KENALOG-40) injection 40 mg     triamcinolone (KENALOG-40) injection 40 mg     triamcinolone (KENALOG-40) injection 40 mg     triamcinolone (KENALOG-40) injection 40 mg     triamcinolone (KENALOG-40) injection 40 mg     triamcinolone (KENALOG-40) injection 40 mg     Allergies   Allergen Reactions     Tylenol W/Codeine [Acetaminophen-Codeine] Itching     codeine     Percocet [Oxycodone-Acetaminophen] Rash     Sulfa Drugs Rash         Objective:  /72 (BP Location: Right arm)   Wt 67.1 kg (148 lb)   BMI 26.22 kg/m      General: Alert and in no distress    Head: Normocephalic, atraumatic  Eyes: no scleral icterus or conjunctival erythema   Skin: no erythema, petechiae, or jaundice  Resp: normal respiratory effort without conversational dyspnea   Psych: normal mood and affect      Radiology:  Independent visualization of images performed  XR KNEE BILATERAL 3 VW 12/7/2020 2:00 PM      HISTORY: Pain in both knees, unspecified chronicity; Pain in both  knees, unspecified chronicity                                                                      IMPRESSION: Bilateral medial compartment and patellofemoral lateral  facet moderate joint space narrowing. Nonspecific bilateral joint  effusions. Left knee posterior articular bodies.     WILL PAULA MD    Large Joint Injection/Arthocentesis: bilateral knee    Date/Time: 11/18/2021 1:24 PM  Performed by: Nessa Esposito MD  Authorized by: Nessa Esposito MD     Indications:  Pain  Needle Size:  25 G  Guidance: landmark guided    Approach:  Anterolateral  Location:  Knee  Laterality:  Bilateral      Medications (Right):  40 mg triamcinolone 40 MG/ML   Medications (Right) comment:  4 mL 0.5% Bupivacaine  NDC: 2333-6670-81  CBU: [id number]  Exp: 09/01/2022    Medications (Left):  40 mg triamcinolone 40 MG/ML   Medications (Left) comment:  4 mL 0.5% Bupivacaine  ND: 5816-4228-51  CBU: [id number]  Exp: 09/01/2022  Outcome:  Tolerated well, no immediate  complications  Procedure discussed: discussed risks, benefits, and alternatives    Consent Given by:  Patient        Assessment:  1. Chronic pain of both knees    2. Bilateral primary osteoarthritis of knee        Plan:  Discussed the assessment with the patient and developed a plan together:  -Steroid injections performed today.  Take it easy over the next few days. Keep in mind that the steroid may take up to 3 days to start working and up to 2 weeks to reach maximal effect.    -Ice for 15-20 minutes as needed for soreness and swelling.  -Patient's preferred over the counter medications as needed for soreness.  -Brace for right knee and needed for comfort and support.     -Follow up as needed if symptoms fail to improve or worsen.  Please call with questions or concerns.          Taisha Esposito MD, CAQ Sports Medicine  [facility]

## 2021-11-18 ENCOUNTER — OFFICE VISIT (OUTPATIENT)
Dept: ORTHOPEDICS | Facility: OTHER | Age: 76
End: 2021-11-18
Payer: COMMERCIAL

## 2021-11-18 VITALS — WEIGHT: 148 LBS | BODY MASS INDEX: 26.22 KG/M2 | DIASTOLIC BLOOD PRESSURE: 72 MMHG | SYSTOLIC BLOOD PRESSURE: 126 MMHG

## 2021-11-18 DIAGNOSIS — M17.0 BILATERAL PRIMARY OSTEOARTHRITIS OF KNEE: ICD-10-CM

## 2021-11-18 DIAGNOSIS — G89.29 CHRONIC PAIN OF BOTH KNEES: Primary | ICD-10-CM

## 2021-11-18 DIAGNOSIS — M25.562 CHRONIC PAIN OF BOTH KNEES: Primary | ICD-10-CM

## 2021-11-18 DIAGNOSIS — M25.561 CHRONIC PAIN OF BOTH KNEES: Primary | ICD-10-CM

## 2021-11-18 PROCEDURE — 99213 OFFICE O/P EST LOW 20 MIN: CPT | Mod: 25 | Performed by: PHYSICAL MEDICINE & REHABILITATION

## 2021-11-18 PROCEDURE — 20610 DRAIN/INJ JOINT/BURSA W/O US: CPT | Mod: 50 | Performed by: PHYSICAL MEDICINE & REHABILITATION

## 2021-11-18 RX ORDER — TRIAMCINOLONE ACETONIDE 40 MG/ML
40 INJECTION, SUSPENSION INTRA-ARTICULAR; INTRAMUSCULAR
Status: DISCONTINUED | OUTPATIENT
Start: 2021-11-18 | End: 2022-09-27

## 2021-11-18 RX ADMIN — TRIAMCINOLONE ACETONIDE 40 MG: 40 INJECTION, SUSPENSION INTRA-ARTICULAR; INTRAMUSCULAR at 13:24

## 2021-11-18 ASSESSMENT — PAIN SCALES - GENERAL: PAINLEVEL: MILD PAIN (2)

## 2021-11-18 NOTE — PATIENT INSTRUCTIONS
-Steroid injections performed today.  Take it easy over the next few days. Keep in mind that the steroid may take up to 3 days to start working and up to 2 weeks to reach maximal effect.    -Ice for 15-20 minutes as needed for soreness and swelling.  -Patient's preferred over the counter medications as needed for soreness.  -Brace for right knee and needed for comfort and support.     -Follow up as needed if symptoms fail to improve or worsen.  Please call with questions or concerns.

## 2021-11-18 NOTE — LETTER
11/18/2021         RE: Nicole Slaughter  [address] [place] [address]        Dear Colleague,    Thank you for referring your patient, Nicole Slaughter, to the [facility]. Please see a copy of my visit note below.    Sports Medicine Clinic Visit       PCP: Lizett Parikh    Nicole Slaughter is a 76 year old female who is seen in follow up for bilateral knee pain. Since last visit on 4/12/2021, Nicole has noticed an increase in knee pain. Her right knee is starting to feel unstable.  Pain is over the medial and lateral joint line of her right knee and her generalized left knee. She rates the pain at a 2/10 currently.  Symptoms are relieved with corticosteroid injections, Aleve, IcyHot and CBD tincture and ointment in the past.  Symptoms are worsened by getting into the car after grocery store, extreme flexion. She had bilateral corticosteroid injections with Dr. Cristobal Levi on 7/12/2021 that provided 2 months of relief.    She is retired.      History reviewed. No pertinent past surgical/medical/family/social history other than as mentioned in HPI.    Patient Active Problem List   Diagnosis     Generalized osteoarthrosis, unspecified site     Arthropathy     Hypertension goal BP (blood pressure) < 140/90     Migraine headache     Hyperlipidemia LDL goal <130     Knee pain     Back pain     Neck pain     Arthritis of hand, degenerative     GERD (gastroesophageal reflux disease)     Health Care Home     Advanced directives, counseling/discussion     Depression, major, in remission (H)     Thyroid nodule     Osteopenia     Early menopause     Past Medical History:   Diagnosis Date     Cervicalgia      Depressive disorder      Generalized osteoarthrosis, unspecified site      Migraine, unspecified, without mention of intractable migraine without mention of status migrainosus      Thyroid nodule 12/1/2016     Unspecified essential hypertension      Past Surgical  History:   Procedure Laterality Date     HYSTERECTOMY, TANG       ORTHOPEDIC SURGERY       SOFT TISSUE SURGERY       Family History   Problem Relation Age of Onset     Arthritis Mother      Lipids Mother      Hypertension Mother      Breast Cancer Mother      Anesthesia Reaction Mother      Heart Disease Father      Cerebrovascular Disease Father      Arthritis Maternal Grandmother      Breast Cancer Maternal Aunt      Breast Cancer Maternal Aunt      Diabetes No family hx of      Coronary Artery Disease No family hx of      Hyperlipidemia No family hx of      Prostate Cancer No family hx of      Other Cancer No family hx of      Anxiety Disorder No family hx of      Mental Illness No family hx of      Substance Abuse No family hx of      Social History     Socioeconomic History     Marital status:      Spouse name: Not on file     Number of children: Not on file     Years of education: Not on file     Highest education level: Not on file   Occupational History     Not on file   Tobacco Use     Smoking status: Never Smoker     Smokeless tobacco: Never Used   Substance and Sexual Activity     Alcohol use: Yes     Comment: 5 drinks per week / wine      Drug use: No     Sexual activity: Yes     Partners: Male     Birth control/protection: Surgical, Female Surgical     Comment: not currenlty/hysterectomy   Other Topics Concern     Parent/sibling w/ CABG, MI or angioplasty before 65F 55M? No   Social History Narrative     Not on file     Social Determinants of Health     Financial Resource Strain: Not on file   Food Insecurity: Not on file   Transportation Needs: Not on file   Physical Activity: Not on file   Stress: Not on file   Social Connections: Not on file   Intimate Partner Violence: Not on file   Housing Stability: Not on file         Current Outpatient Medications   Medication     acyclovir (ZOVIRAX) 400 MG tablet     atorvastatin (LIPITOR) 40 MG tablet     CALCIUM-VITAMIN D PO     chlorthalidone  (HYGROTON) 25 MG tablet     gabapentin (NEURONTIN) 100 MG capsule     MAGNESIUM PO     omeprazole (PRILOSEC) 20 MG DR capsule     SUMAtriptan (IMITREX) 50 MG tablet     tiZANidine (ZANAFLEX) 2 MG tablet     Current Facility-Administered Medications   Medication     triamcinolone (KENALOG-40) injection 40 mg     triamcinolone (KENALOG-40) injection 40 mg     triamcinolone (KENALOG-40) injection 40 mg     triamcinolone (KENALOG-40) injection 40 mg     triamcinolone (KENALOG-40) injection 40 mg     triamcinolone (KENALOG-40) injection 40 mg     Allergies   Allergen Reactions     Tylenol W/Codeine [Acetaminophen-Codeine] Itching     codeine     Percocet [Oxycodone-Acetaminophen] Rash     Sulfa Drugs Rash         Objective:  /72 (BP Location: Right arm)   Wt 67.1 kg (148 lb)   BMI 26.22 kg/m      General: Alert and in no distress    Head: Normocephalic, atraumatic  Eyes: no scleral icterus or conjunctival erythema   Skin: no erythema, petechiae, or jaundice  Resp: normal respiratory effort without conversational dyspnea   Psych: normal mood and affect      Radiology:  Independent visualization of images performed  XR KNEE BILATERAL 3 VW 12/7/2020 2:00 PM      HISTORY: Pain in both knees, unspecified chronicity; Pain in both  knees, unspecified chronicity                                                                      IMPRESSION: Bilateral medial compartment and patellofemoral lateral  facet moderate joint space narrowing. Nonspecific bilateral joint  effusions. Left knee posterior articular bodies.     WILL PAULA MD    Large Joint Injection/Arthocentesis: bilateral knee    Date/Time: 11/18/2021 1:24 PM  Performed by: Nessa Esposito MD  Authorized by: Nessa Esposito MD     Indications:  Pain  Needle Size:  25 G  Guidance: landmark guided    Approach:  Anterolateral  Location:  Knee  Laterality:  Bilateral      Medications (Right):  40 mg triamcinolone 40 MG/ML   Medications (Right)  comment:  4 mL 0.5% Bupivacaine  ND: 2426-0025-63  U: [id number]  Exp: 09/01/2022    Medications (Left):  40 mg triamcinolone 40 MG/ML   Medications (Left) comment:  4 mL 0.5% Bupivacaine  ND: 2164-4548-85  CBU: [id number]  Exp: 09/01/2022  Outcome:  Tolerated well, no immediate complications  Procedure discussed: discussed risks, benefits, and alternatives    Consent Given by:  Patient        Assessment:  1. Chronic pain of both knees    2. Bilateral primary osteoarthritis of knee        Plan:  Discussed the assessment with the patient and developed a plan together:  -Steroid injections performed today.  Take it easy over the next few days. Keep in mind that the steroid may take up to 3 days to start working and up to 2 weeks to reach maximal effect.    -Ice for 15-20 minutes as needed for soreness and swelling.  -Patient's preferred over the counter medications as needed for soreness.  -Brace for right knee and needed for comfort and support.     -Follow up as needed if symptoms fail to improve or worsen.  Please call with questions or concerns.          Taisha Esposito MD, [facility] Sports Medicine  [facility]          Again, thank you for allowing me to participate in the care of your patient.        Sincerely,        Nessa Esposito MD

## 2021-11-26 DIAGNOSIS — E78.5 HYPERLIPIDEMIA LDL GOAL <130: ICD-10-CM

## 2021-11-29 RX ORDER — ATORVASTATIN CALCIUM 40 MG/1
40 TABLET, FILM COATED ORAL
Qty: 24 TABLET | Refills: 0 | Status: SHIPPED | OUTPATIENT
Start: 2021-11-29 | End: 2022-02-14

## 2021-11-29 NOTE — TELEPHONE ENCOUNTER
Routing refill request to provider for review/approval because:  LDL Cholesterol Calculated   Date Value Ref Range Status   11/25/2020 124 (H) <100 mg/dL Final     Comment:     Above desirable:  100-129 mg/dl  Borderline High:  130-159 mg/dL  High:             160-189 mg/dL  Very high:       >189 mg/dl         Negar Jimenez RN, BSN

## 2021-12-08 DIAGNOSIS — K21.9 GASTROESOPHAGEAL REFLUX DISEASE, UNSPECIFIED WHETHER ESOPHAGITIS PRESENT: ICD-10-CM

## 2021-12-10 NOTE — TELEPHONE ENCOUNTER
Pending Prescriptions:                       Disp   Refills    omeprazole (PRILOSEC) 20 MG DR capsule [P*90 cap*0            Sig: TAKE ONE CAPSULE BY MOUTH EVERY DAY BEFORE A MEAL    Medication is being filled for 1 time mariangel refill only due to:  Patient is due for annual wellness    Please call and help schedule.  Thank you!

## 2022-02-13 ENCOUNTER — HEALTH MAINTENANCE LETTER (OUTPATIENT)
Age: 77
End: 2022-02-13

## 2022-03-07 DIAGNOSIS — K21.9 GASTROESOPHAGEAL REFLUX DISEASE, UNSPECIFIED WHETHER ESOPHAGITIS PRESENT: ICD-10-CM

## 2022-03-28 ENCOUNTER — OFFICE VISIT (OUTPATIENT)
Dept: ORTHOPEDICS | Facility: CLINIC | Age: 77
End: 2022-03-28
Payer: COMMERCIAL

## 2022-03-28 DIAGNOSIS — M25.461 EFFUSION OF RIGHT KNEE: Primary | ICD-10-CM

## 2022-03-28 DIAGNOSIS — M17.0 BILATERAL PRIMARY OSTEOARTHRITIS OF KNEE: ICD-10-CM

## 2022-03-28 PROCEDURE — 20611 DRAIN/INJ JOINT/BURSA W/US: CPT | Mod: 50 | Performed by: FAMILY MEDICINE

## 2022-03-28 RX ORDER — TRIAMCINOLONE ACETONIDE 40 MG/ML
40 INJECTION, SUSPENSION INTRA-ARTICULAR; INTRAMUSCULAR
Status: DISCONTINUED | OUTPATIENT
Start: 2022-03-28 | End: 2022-09-27

## 2022-03-28 RX ADMIN — TRIAMCINOLONE ACETONIDE 40 MG: 40 INJECTION, SUSPENSION INTRA-ARTICULAR; INTRAMUSCULAR at 14:29

## 2022-03-28 NOTE — LETTER
3/28/2022         RE: Nicole Slaughter  [address] [place] [address]        Dear Colleague,    Thank you for referring your patient, Nicole Slaughter, to the [facility] [place]. Please see a copy of my visit note below.    Nicole has bilateral knee osteoarthritis.  She is here for injections in both knees.    She has a significant right knee effusion, for which I performed an aspiration prior to injection.      Large Joint Injection/Arthocentesis: bilateral knee    Date/Time: 3/28/2022 2:29 PM  Performed by: Cristobal Levi DO  Authorized by: Cristobal Levi DO     Indications:  Pain  Needle Size:  22 G  Guidance: ultrasound    Approach:  Lateral  Location:  Knee  Laterality:  Bilateral      Medications (Right):  40 mg triamcinolone 40 MG/ML  Aspirate amount (mL):  30  Aspirate:  Yellow and serous  Medications (Left):  40 mg triamcinolone 40 MG/ML  Outcome:  Tolerated well, no immediate complications  Procedure discussed: discussed risks, benefits, and alternatives    Consent Given by:  Patient  Timeout: timeout called immediately prior to procedure    Prep: patient was prepped and draped in usual sterile fashion       Knee Aspiration and Injection - Ultrasound Guided  The patient was informed of the risks and the benefits of the procedure and a written consent was signed.  The patient s right  knee was prepped with chlorhexidine in sterile fashion.   40 mg of triamcinolone suspension was drawn up into a 5 mL syringe with 4 mL of 1% lidocaine.  Procedure was performed using sterile technique.  Local anesthesia was performed using a 27-gauge 1.5-inch needle to administer 3 mL of 1% lidocaine without epi.  Under ultrasound guidance a 1.5-inch 18-gauge needle on a 30 cc syringe was used to enter the superolateral aspect of the knee.  Needle placement was visualized and documented with ultrasound.  Ultrasound visualization was necessary due to ensure proper placement of  needle in to the effusion, as well as resolution of effusion once aspiration was completed.  30 cc of yellow joint fluid was aspirated.  Syringe was then swapped for the 5 mL syringe containing the corticosteroid injectate, holding the needle in place with a sterile hemostat.  Injection was performed successfully, without difficulty.  Images were permanently stored for the patient's record.  There were no complications. The patient tolerated the procedure well. There was negligible bleeding.   The patient was instructed to ice the knee upon leaving clinic and refrain from overuse over the next 3 days.   The patient was instructed to call or go to the emergency room with any unusual pain, swelling, redness, or if otherwise concerned.    Knee Injection - Ultrasound Guided  The patient was informed of the risks and the benefits of the procedure and a written consent was signed.  The patient s left knee was prepped with chlorhexidine in sterile fashion.   40 mg of triamcinolone suspension was drawn up into a 5 mL syringe with 4 mL of 1% lidocaine.  Injection was performed using sterile technique.  Under ultrasound guidance a 1.5-inch 22-gauge needle was used to enter the superolateral aspect of the knee.  Needle placement was visualized and documented with ultrasound.  Ultrasound visualization was necessary due to decreased joint space in the setting of osteoarthritis.  Images were permanently stored for the patient's record.  There were no complications. The patient tolerated the procedure well. There was negligible bleeding.                   Again, thank you for allowing me to participate in the care of your patient.        Sincerely,        Cristobal Levi DO

## 2022-03-28 NOTE — PROGRESS NOTES
Nicole has bilateral knee osteoarthritis.  She is here for injections in both knees.    She has a significant right knee effusion, for which I performed an aspiration prior to injection.      Large Joint Injection/Arthocentesis: bilateral knee    Date/Time: 3/28/2022 2:29 PM  Performed by: Cristobal Levi DO  Authorized by: Cristobal Levi DO     Indications:  Pain  Needle Size:  22 G  Guidance: ultrasound    Approach:  Lateral  Location:  Knee  Laterality:  Bilateral      Medications (Right):  40 mg triamcinolone 40 MG/ML  Aspirate amount (mL):  30  Aspirate:  Yellow and serous  Medications (Left):  40 mg triamcinolone 40 MG/ML  Outcome:  Tolerated well, no immediate complications  Procedure discussed: discussed risks, benefits, and alternatives    Consent Given by:  Patient  Timeout: timeout called immediately prior to procedure    Prep: patient was prepped and draped in usual sterile fashion       Knee Aspiration and Injection - Ultrasound Guided  The patient was informed of the risks and the benefits of the procedure and a written consent was signed.  The patient s right  knee was prepped with chlorhexidine in sterile fashion.   40 mg of triamcinolone suspension was drawn up into a 5 mL syringe with 4 mL of 1% lidocaine.  Procedure was performed using sterile technique.  Local anesthesia was performed using a 27-gauge 1.5-inch needle to administer 3 mL of 1% lidocaine without epi.  Under ultrasound guidance a 1.5-inch 18-gauge needle on a 30 cc syringe was used to enter the superolateral aspect of the knee.  Needle placement was visualized and documented with ultrasound.  Ultrasound visualization was necessary due to ensure proper placement of needle in to the effusion, as well as resolution of effusion once aspiration was completed.  30 cc of yellow joint fluid was aspirated.  Syringe was then swapped for the 5 mL syringe containing the corticosteroid injectate, holding the needle in place with a sterile  hemostat.  Injection was performed successfully, without difficulty.  Images were permanently stored for the patient's record.  There were no complications. The patient tolerated the procedure well. There was negligible bleeding.   The patient was instructed to ice the knee upon leaving clinic and refrain from overuse over the next 3 days.   The patient was instructed to call or go to the emergency room with any unusual pain, swelling, redness, or if otherwise concerned.    Knee Injection - Ultrasound Guided  The patient was informed of the risks and the benefits of the procedure and a written consent was signed.  The patient s left knee was prepped with chlorhexidine in sterile fashion.   40 mg of triamcinolone suspension was drawn up into a 5 mL syringe with 4 mL of 1% lidocaine.  Injection was performed using sterile technique.  Under ultrasound guidance a 1.5-inch 22-gauge needle was used to enter the superolateral aspect of the knee.  Needle placement was visualized and documented with ultrasound.  Ultrasound visualization was necessary due to decreased joint space in the setting of osteoarthritis.  Images were permanently stored for the patient's record.  There were no complications. The patient tolerated the procedure well. There was negligible bleeding.

## 2022-04-12 NOTE — PROGRESS NOTES
Assessment & Plan     Hypertension goal BP (blood pressure) < 140/90  Doing well. Well controlled. Tolerating medication.  No change in plan.    - chlorthalidone (HYGROTON) 25 MG tablet; TAKE HALF TABLET BY MOUTH DAILY    Moderate major depression (H)  Patient with history of depression now with recurrence of her symptoms especially over the last several months.  She is going through some stressors with her 's memory concerns.  This is felt very overwhelming for Nicole.  Discussed the symptoms that she is noticing.  She would like to consider medication.  She reports issues with Prozac though she took it for some time.  She reports she did not do well with Zoloft either.  Seems as though these cause some jitteriness for her.  Discussed a trial of Cymbalta for anxiety and depression.  Discussed that this can also help with pain and may see some assistance with her joint pains as well as migraines.  She is in agreement to a trial and will follow up in 4 to 6 weeks.  Sooner as needed.  - Adult Mental Health  Referral; Future  - DULoxetine (CYMBALTA) 30 MG capsule; 30 mg daily for 2 weeks, then 60 mg daily.    Hyperlipidemia LDL goal <130  Check lipid panel today and will adjust medication if needed.  She is tolerating the medication well without concerns.  - Lipid panel reflex to direct LDL Non-fasting; Future    Migraine without aura and without status migrainosus, not intractable  Patient reports some worsening of her headaches recently.  She finds they get worse with stress.  She does not wish to be on the gabapentin anymore.  Retrying Cymbalta as above.  Consider weaning off gabapentin depending on how she responds to that addition.  Could consider other medication for prophylaxis 2.  Patient agrees we will continue with her current plan to readdress in the future.  - SUMAtriptan (IMITREX) 50 MG tablet; Take one tablet at onset of headache. May repeat in 2 hours as needed. Max 2/day  - gabapentin  "(NEURONTIN) 100 MG capsule; Take 1 capsule (100 mg) by mouth 3 times daily TAKE ONE CAPSULE BY MOUTH THREE TIMES DAILY    Herpes simplex virus infection  No current outbreak.  Would like refills.  Done.  - acyclovir (ZOVIRAX) 400 MG tablet; Take 1 tablet (400 mg) by mouth 2 times daily    Right knee pain, unspecified chronicity  Patient would like a refill on the Zanaflex to have on hand as needed for muscle spasms.  Refill given.  - tiZANidine (ZANAFLEX) 2 MG tablet; Take 1 tablet (2 mg) by mouth 2 times daily as needed for muscle spasms    Gastroesophageal reflux disease, unspecified whether esophagitis present  Doing well. Well controlled. Tolerating medication.  No change in plan.    - omeprazole (PRILOSEC) 20 MG DR capsule; Take 1 capsule (20 mg) by mouth daily    High priority for 2019-nCoV vaccine  Patient is eligible for a second booster.  She would like to proceed today.  Done.  - COVID-19,,PFIZER (12+ Yrs GRAY LABEL)    Encounter for long-term (current) use of medications  Will notify of results.   - Comprehensive metabolic panel (BMP + Alb, Alk Phos, ALT, AST, Total. Bili, TP); Future  - Comprehensive metabolic panel (BMP + Alb, Alk Phos, ALT, AST, Total. Bili, TP)             BMI:   Estimated body mass index is 27.11 kg/m  as calculated from the following:    Height as of this encounter: 1.6 m (5' 2.99\").    Weight as of this encounter: 69.4 kg (153 lb).           Return in about 5 weeks (around 5/25/2022) for follow up depression, Annual Wellness.    Lizett Parikh MD  [facility] BISHNU Rivera is a 77 year old who presents for the following health issues     History of Present Illness       Mental Health Follow-up:  Patient presents to follow-up on Depression & Anxiety.Patient's depression since last visit has been:  Worse  The patient is not having other symptoms associated with depression.  Patient's anxiety since last visit has been:  Worse  The patient is not " "having other symptoms associated with anxiety.  Any significant life events: other  Patient is not feeling anxious or having panic attacks.  Patient has no concerns about alcohol or drug use.       Today's PHQ-9         PHQ-9 Total Score: 10  PHQ-9 Q9 Thoughts of better off dead/self-harm past 2 weeks :   (P) Not at all    How difficult have these problems made it for you to do your work, take care of things at home, or get along with other people: Somewhat difficult    Today's ASHWIN-7 Score: 14    Reason for visit:  To get refills on meds    She eats 2-3 servings of fruits and vegetables daily.She consumes 0 sweetened beverage(s) daily.She exercises with enough effort to increase her heart rate 9 or less minutes per day.  She exercises with enough effort to increase her heart rate 3 or less days per week.   She is taking medications regularly.     Microvascular dementia. Diagnosed in 2021.  Had \"spinal fluid removed\" and that has helped. He is doing better. \"But I am falling apart\".   can no longer drive and is having a hard time. Patient is now doing \"everything\".    Has to do the driving. Has to do the bills.   Reports she is in tears a lot of the time or feeling very angry. \"never sworn so much in my life\".   Feels this is slowly worsening.   Prozac had made her feel jittery in the past.   Open to counseling.       Hyperlipidemia Follow-Up      Are you regularly taking any medication or supplement to lower your cholesterol?   Yes- Lipitor    Are you having muscle aches or other side effects that you think could be caused by your cholesterol lowering medication?  No    Hypertension Follow-up      Do you check your blood pressure regularly outside of the clinic? Yes     Are you following a low salt diet? Yes    Are your blood pressures ever more than 140 on the top number (systolic) OR more   than 90 on the bottom number (diastolic), for example 140/90? No    Migraine     Since your last clinic visit, how " "have your headaches changed?  Waking up with headaches. Usually happens spring and fall.     How often are you getting headaches or migraines?  2-3 times per week    Are you able to do normal daily activities when you have a migraine? No until takes Imitrex    Are you taking rescue/relief medications? (Select all that apply) sumatriptan (Imitrex)    How helpful is your rescue/relief medication?  I get total relief    Are you taking any medications to prevent migraines? (Select all that apply)  Neurontin -gabapentin    In the past 4 weeks, how often have you gone to urgent care or the emergency room because of your headaches?  0    GERD - under control.  No concerns at this time.    Right knee pain-history of arthritis of the right knee and effusion.  She is seeing sports medicine for management.          Review of Systems   CONSTITUTIONAL: NEGATIVE for fever, chills, change in weight  ENT/MOUTH: NEGATIVE for ear, mouth and throat problems  RESP: NEGATIVE for significant cough or SOB  CV: NEGATIVE for chest pain, palpitations or peripheral edema      Objective    BP (!) 146/78   Pulse 82   Temp 97.8  F (36.6  C) (Temporal)   Resp 18   Ht 1.6 m (5' 2.99\")   Wt 69.4 kg (153 lb)   SpO2 98%   BMI 27.11 kg/m    Body mass index is 27.11 kg/m .  Physical Exam   GENERAL: healthy, alert and no distress  NECK: no adenopathy, no asymmetry, masses, or scars and thyroid normal to palpation  RESP: lungs clear to auscultation - no rales, rhonchi or wheezes  CV: regular rate and rhythm, normal S1 S2, no S3 or S4, no murmur, click or rub, no peripheral edema and peripheral pulses strong  ABDOMEN: soft, nontender, no hepatosplenomegaly, no masses and bowel sounds normal  MS: no gross musculoskeletal defects noted, no edema  PSYCH: mentation appears normal, appearance well groomed and tearful at times                "

## 2022-04-18 ASSESSMENT — ANXIETY QUESTIONNAIRES
GAD7 TOTAL SCORE: 14
GAD7 TOTAL SCORE: 14
5. BEING SO RESTLESS THAT IT IS HARD TO SIT STILL: NOT AT ALL
3. WORRYING TOO MUCH ABOUT DIFFERENT THINGS: NEARLY EVERY DAY
7. FEELING AFRAID AS IF SOMETHING AWFUL MIGHT HAPPEN: NEARLY EVERY DAY
GAD7 TOTAL SCORE: 14
2. NOT BEING ABLE TO STOP OR CONTROL WORRYING: NEARLY EVERY DAY
4. TROUBLE RELAXING: SEVERAL DAYS
6. BECOMING EASILY ANNOYED OR IRRITABLE: NEARLY EVERY DAY
1. FEELING NERVOUS, ANXIOUS, OR ON EDGE: SEVERAL DAYS
7. FEELING AFRAID AS IF SOMETHING AWFUL MIGHT HAPPEN: NEARLY EVERY DAY

## 2022-04-18 ASSESSMENT — PATIENT HEALTH QUESTIONNAIRE - PHQ9
SUM OF ALL RESPONSES TO PHQ QUESTIONS 1-9: 10
10. IF YOU CHECKED OFF ANY PROBLEMS, HOW DIFFICULT HAVE THESE PROBLEMS MADE IT FOR YOU TO DO YOUR WORK, TAKE CARE OF THINGS AT HOME, OR GET ALONG WITH OTHER PEOPLE: SOMEWHAT DIFFICULT
SUM OF ALL RESPONSES TO PHQ QUESTIONS 1-9: 10

## 2022-04-19 ASSESSMENT — ANXIETY QUESTIONNAIRES: GAD7 TOTAL SCORE: 14

## 2022-04-19 ASSESSMENT — PATIENT HEALTH QUESTIONNAIRE - PHQ9: SUM OF ALL RESPONSES TO PHQ QUESTIONS 1-9: 10

## 2022-04-20 ENCOUNTER — OFFICE VISIT (OUTPATIENT)
Dept: FAMILY MEDICINE | Facility: CLINIC | Age: 77
End: 2022-04-20
Payer: COMMERCIAL

## 2022-04-20 VITALS
HEART RATE: 82 BPM | DIASTOLIC BLOOD PRESSURE: 76 MMHG | OXYGEN SATURATION: 98 % | SYSTOLIC BLOOD PRESSURE: 134 MMHG | TEMPERATURE: 97.8 F | RESPIRATION RATE: 18 BRPM | WEIGHT: 153 LBS | BODY MASS INDEX: 27.11 KG/M2 | HEIGHT: 63 IN

## 2022-04-20 DIAGNOSIS — K21.9 GASTROESOPHAGEAL REFLUX DISEASE, UNSPECIFIED WHETHER ESOPHAGITIS PRESENT: ICD-10-CM

## 2022-04-20 DIAGNOSIS — M25.561 RIGHT KNEE PAIN, UNSPECIFIED CHRONICITY: ICD-10-CM

## 2022-04-20 DIAGNOSIS — B00.9 HERPES SIMPLEX VIRUS INFECTION: ICD-10-CM

## 2022-04-20 DIAGNOSIS — G43.009 MIGRAINE WITHOUT AURA AND WITHOUT STATUS MIGRAINOSUS, NOT INTRACTABLE: ICD-10-CM

## 2022-04-20 DIAGNOSIS — E78.5 HYPERLIPIDEMIA LDL GOAL <130: ICD-10-CM

## 2022-04-20 DIAGNOSIS — F32.1 MODERATE MAJOR DEPRESSION (H): ICD-10-CM

## 2022-04-20 DIAGNOSIS — I10 HYPERTENSION GOAL BP (BLOOD PRESSURE) < 140/90: Primary | ICD-10-CM

## 2022-04-20 DIAGNOSIS — Z79.899 ENCOUNTER FOR LONG-TERM (CURRENT) USE OF MEDICATIONS: ICD-10-CM

## 2022-04-20 DIAGNOSIS — Z23 HIGH PRIORITY FOR 2019-NCOV VACCINE: ICD-10-CM

## 2022-04-20 LAB
ALBUMIN SERPL-MCNC: 3.7 G/DL (ref 3.4–5)
ALP SERPL-CCNC: 75 U/L (ref 40–150)
ALT SERPL W P-5'-P-CCNC: 27 U/L (ref 0–50)
ANION GAP SERPL CALCULATED.3IONS-SCNC: 6 MMOL/L (ref 3–14)
AST SERPL W P-5'-P-CCNC: 19 U/L (ref 0–45)
BILIRUB SERPL-MCNC: 0.7 MG/DL (ref 0.2–1.3)
BUN SERPL-MCNC: 17 MG/DL (ref 7–30)
CALCIUM SERPL-MCNC: 9.7 MG/DL (ref 8.5–10.1)
CHLORIDE BLD-SCNC: 107 MMOL/L (ref 94–109)
CHOLEST SERPL-MCNC: 233 MG/DL
CO2 SERPL-SCNC: 29 MMOL/L (ref 20–32)
CREAT SERPL-MCNC: 0.75 MG/DL (ref 0.52–1.04)
FASTING STATUS PATIENT QL REPORTED: NO
GFR SERPL CREATININE-BSD FRML MDRD: 83 ML/MIN/1.73M2
GLUCOSE BLD-MCNC: 91 MG/DL (ref 70–99)
HDLC SERPL-MCNC: 80 MG/DL
LDLC SERPL CALC-MCNC: 136 MG/DL
NONHDLC SERPL-MCNC: 153 MG/DL
POTASSIUM BLD-SCNC: 4.6 MMOL/L (ref 3.4–5.3)
PROT SERPL-MCNC: 7.1 G/DL (ref 6.8–8.8)
SODIUM SERPL-SCNC: 142 MMOL/L (ref 133–144)
TRIGL SERPL-MCNC: 86 MG/DL

## 2022-04-20 PROCEDURE — 0054A COVID-19,PF,PFIZER (12+ YRS): CPT | Performed by: FAMILY MEDICINE

## 2022-04-20 PROCEDURE — 36415 COLL VENOUS BLD VENIPUNCTURE: CPT | Performed by: FAMILY MEDICINE

## 2022-04-20 PROCEDURE — 80053 COMPREHEN METABOLIC PANEL: CPT | Performed by: FAMILY MEDICINE

## 2022-04-20 PROCEDURE — 80061 LIPID PANEL: CPT | Performed by: FAMILY MEDICINE

## 2022-04-20 PROCEDURE — 99214 OFFICE O/P EST MOD 30 MIN: CPT | Mod: 25 | Performed by: FAMILY MEDICINE

## 2022-04-20 PROCEDURE — 91305 COVID-19,PF,PFIZER (12+ YRS): CPT | Performed by: FAMILY MEDICINE

## 2022-04-20 RX ORDER — ACYCLOVIR 400 MG/1
400 TABLET ORAL 2 TIMES DAILY
Qty: 60 TABLET | Refills: 3 | Status: SHIPPED | OUTPATIENT
Start: 2022-04-20 | End: 2022-12-20

## 2022-04-20 RX ORDER — GABAPENTIN 100 MG/1
100 CAPSULE ORAL 3 TIMES DAILY
Qty: 270 CAPSULE | Refills: 3 | Status: SHIPPED | OUTPATIENT
Start: 2022-04-20 | End: 2023-05-23

## 2022-04-20 RX ORDER — CHLORTHALIDONE 25 MG/1
TABLET ORAL
Qty: 45 TABLET | Refills: 3 | Status: SHIPPED | OUTPATIENT
Start: 2022-04-20 | End: 2023-04-21

## 2022-04-20 RX ORDER — SUMATRIPTAN 50 MG/1
TABLET, FILM COATED ORAL
Qty: 18 TABLET | Refills: 3 | Status: SHIPPED | OUTPATIENT
Start: 2022-04-20 | End: 2023-07-24

## 2022-04-20 RX ORDER — TIZANIDINE 2 MG/1
2 TABLET ORAL 2 TIMES DAILY PRN
Qty: 90 TABLET | Refills: 0 | Status: SHIPPED | OUTPATIENT
Start: 2022-04-20 | End: 2023-09-20

## 2022-04-20 RX ORDER — DULOXETIN HYDROCHLORIDE 30 MG/1
CAPSULE, DELAYED RELEASE ORAL
Qty: 60 CAPSULE | Refills: 1 | Status: SHIPPED | OUTPATIENT
Start: 2022-04-20 | End: 2022-06-28

## 2022-04-20 RX ORDER — ATORVASTATIN CALCIUM 40 MG/1
TABLET, FILM COATED ORAL
Qty: 24 TABLET | Refills: 0 | Status: CANCELLED | OUTPATIENT
Start: 2022-04-20

## 2022-04-20 ASSESSMENT — PATIENT HEALTH QUESTIONNAIRE - PHQ9
SUM OF ALL RESPONSES TO PHQ QUESTIONS 1-9: 10
10. IF YOU CHECKED OFF ANY PROBLEMS, HOW DIFFICULT HAVE THESE PROBLEMS MADE IT FOR YOU TO DO YOUR WORK, TAKE CARE OF THINGS AT HOME, OR GET ALONG WITH OTHER PEOPLE: SOMEWHAT DIFFICULT

## 2022-04-20 ASSESSMENT — ANXIETY QUESTIONNAIRES: GAD7 TOTAL SCORE: 14

## 2022-04-20 ASSESSMENT — PAIN SCALES - GENERAL: PAINLEVEL: NO PAIN (1)

## 2022-04-20 NOTE — PATIENT INSTRUCTIONS

## 2022-04-21 ENCOUNTER — TELEPHONE (OUTPATIENT)
Dept: FAMILY MEDICINE | Facility: CLINIC | Age: 77
End: 2022-04-21
Payer: COMMERCIAL

## 2022-04-21 DIAGNOSIS — E78.5 HYPERLIPIDEMIA LDL GOAL <130: ICD-10-CM

## 2022-04-21 RX ORDER — ATORVASTATIN CALCIUM 40 MG/1
TABLET, FILM COATED ORAL
Qty: 24 TABLET | Refills: 3 | Status: SHIPPED | OUTPATIENT
Start: 2022-04-21 | End: 2023-04-11

## 2022-05-31 NOTE — PROGRESS NOTES
Assessment & Plan     Moderate recurrent major depression (H)  Patient has had any improvement on the Cymbalta 60 mg daily.  She reports less tearful episodes and less worry.  She is sleeping better.  She feels like she is doing well at this current dose.  It is possible she could improve further with further increase in dose.  At this point will not increase further due to the constipation she is now experiencing which seems to be related to the starting of the Cymbalta.  See below regarding the constipation.  She will reach out through [employer] before finishing this next month of medication to update me on her mood.  At that time can obtain PHQ-9 and ASHWIN-7 again.  Patient agrees to this plan.    Slow transit constipation  Patient states constipation so starting on the Cymbalta.  It is not so bad that she wishes to stop the Cymbalta but does feel she needs some further help with the constipation.  She has been increasing fiber in her diet.  She has been increasing fluids.  Recommend exercise and she plans to get onto her bike.  Also recommend MiraLAX 1 capful daily in 6 to 8 ounces of fluid.  She will update me in the next couple weeks if she is having continued problems or concerns.    Osteoarthritis of both knees, unspecified osteoarthritis type  Patient with osteoarthritis both knees.  She has been followed by sports medicine.  She does get some relief with injections but the time of relief is variable.  She has Aleve that she is utilized she also has some topicals.  Recommend return to sports medicine to discuss further injections and she will consider.                   Return in about 6 months (around 12/3/2022) for follow up anxiety.    Lizett Parikh MD  [facility] BISHNU Rivera is a 77 year old who presents for the following health issues     History of Present Illness       Mental Health Follow-up:  Patient presents to follow-up on Depression & Anxiety.Patient's  depression since last visit has been:  Better  The patient is not having other symptoms associated with depression.  Patient's anxiety since last visit has been:  Better  The patient is not having other symptoms associated with anxiety.  Any significant life events: grief or loss and health concerns  Patient is not feeling anxious or having panic attacks.  Patient has no concerns about alcohol or drug use.She consumes 0 sweetened beverage(s) daily.She exercises with enough effort to increase her heart rate 20 to 29 minutes per day.  She exercises with enough effort to increase her heart rate 3 or less days per week.   She is taking medications regularly.    Today's PHQ-9         PHQ-9 Total Score: 7    PHQ-9 Q9 Thoughts of better off dead/self-harm past 2 weeks :   Not at all    How difficult have these problems made it for you to do your work, take care of things at home, or get along with other people: Not difficult at all  Today's ASHWIN-7 Score: 3     On Cymbalta.  She feels this is helpful with her mood.  She feels that she is doing much better in regards to the tearful episodes.  She has felt less consistent worrying.  Not helping with her knees.  She is hopeful that this would help with her chronic pain as well.  Currently taking aleve and has tried tylenol.   Aleve seems the best.  She has tried injections and helps with a variable amount of time.  Plans to get back on her bike.     She is noticing constipation issues since being on Cymbalta.  She is try to change foods that she is eating and increase her water but she is continuing to have problems with constipation.  It is not so much that she wishes to stop the Cymbalta.  She is wanting what other options are to help.      Review of Systems   CONSTITUTIONAL: NEGATIVE for fever, chills, change in weight  ENT/MOUTH: NEGATIVE for ear, mouth and throat problems  RESP: NEGATIVE for significant cough or SOB  CV: NEGATIVE for chest pain, palpitations or peripheral  "edema      Objective    /68   Pulse 92   Temp 98.2  F (36.8  C) (Temporal)   Ht 1.58 m (5' 2.21\")   Wt 69.6 kg (153 lb 8 oz)   SpO2 97%   BMI 27.89 kg/m    Body mass index is 27.89 kg/m .  Physical Exam   GENERAL APPEARANCE: healthy, alert and no distress  MENTAL STATUS EXAM:  Appearance/Behavior: No apparent distress and Neatly groomed  Speech: Normal  Mood/Affect: normal affect  Insight: Adequate                "

## 2022-06-03 ENCOUNTER — OFFICE VISIT (OUTPATIENT)
Dept: FAMILY MEDICINE | Facility: CLINIC | Age: 77
End: 2022-06-03
Payer: COMMERCIAL

## 2022-06-03 VITALS
HEIGHT: 62 IN | DIASTOLIC BLOOD PRESSURE: 68 MMHG | SYSTOLIC BLOOD PRESSURE: 134 MMHG | HEART RATE: 92 BPM | TEMPERATURE: 98.2 F | BODY MASS INDEX: 28.25 KG/M2 | OXYGEN SATURATION: 97 % | WEIGHT: 153.5 LBS

## 2022-06-03 DIAGNOSIS — M17.0 OSTEOARTHRITIS OF BOTH KNEES, UNSPECIFIED OSTEOARTHRITIS TYPE: ICD-10-CM

## 2022-06-03 DIAGNOSIS — K59.01 SLOW TRANSIT CONSTIPATION: ICD-10-CM

## 2022-06-03 DIAGNOSIS — F33.1 MODERATE RECURRENT MAJOR DEPRESSION (H): Primary | ICD-10-CM

## 2022-06-03 PROCEDURE — 99214 OFFICE O/P EST MOD 30 MIN: CPT | Performed by: FAMILY MEDICINE

## 2022-06-03 ASSESSMENT — ANXIETY QUESTIONNAIRES
5. BEING SO RESTLESS THAT IT IS HARD TO SIT STILL: NOT AT ALL
1. FEELING NERVOUS, ANXIOUS, OR ON EDGE: SEVERAL DAYS
2. NOT BEING ABLE TO STOP OR CONTROL WORRYING: SEVERAL DAYS
7. FEELING AFRAID AS IF SOMETHING AWFUL MIGHT HAPPEN: NOT AT ALL
3. WORRYING TOO MUCH ABOUT DIFFERENT THINGS: NOT AT ALL
7. FEELING AFRAID AS IF SOMETHING AWFUL MIGHT HAPPEN: NOT AT ALL
6. BECOMING EASILY ANNOYED OR IRRITABLE: SEVERAL DAYS
GAD7 TOTAL SCORE: 3
4. TROUBLE RELAXING: NOT AT ALL
GAD7 TOTAL SCORE: 3
GAD7 TOTAL SCORE: 3

## 2022-06-03 ASSESSMENT — PATIENT HEALTH QUESTIONNAIRE - PHQ9
10. IF YOU CHECKED OFF ANY PROBLEMS, HOW DIFFICULT HAVE THESE PROBLEMS MADE IT FOR YOU TO DO YOUR WORK, TAKE CARE OF THINGS AT HOME, OR GET ALONG WITH OTHER PEOPLE: NOT DIFFICULT AT ALL
SUM OF ALL RESPONSES TO PHQ QUESTIONS 1-9: 7
SUM OF ALL RESPONSES TO PHQ QUESTIONS 1-9: 7

## 2022-06-03 ASSESSMENT — PAIN SCALES - GENERAL: PAINLEVEL: NO PAIN (0)

## 2022-06-03 NOTE — PATIENT INSTRUCTIONS
Miralax 1 capful in 6-8 ounces of fluid daily. Can adjust this dose as needed.     Let me know prior to next refill an update on symptoms. Sooner if needed.

## 2022-07-11 ENCOUNTER — OFFICE VISIT (OUTPATIENT)
Dept: ORTHOPEDICS | Facility: CLINIC | Age: 77
End: 2022-07-11
Payer: COMMERCIAL

## 2022-07-11 DIAGNOSIS — M17.0 BILATERAL PRIMARY OSTEOARTHRITIS OF KNEE: Primary | ICD-10-CM

## 2022-07-11 PROCEDURE — 20610 DRAIN/INJ JOINT/BURSA W/O US: CPT | Mod: 50 | Performed by: FAMILY MEDICINE

## 2022-07-11 PROCEDURE — 99214 OFFICE O/P EST MOD 30 MIN: CPT | Mod: 25 | Performed by: FAMILY MEDICINE

## 2022-07-11 RX ORDER — TRIAMCINOLONE ACETONIDE 40 MG/ML
40 INJECTION, SUSPENSION INTRA-ARTICULAR; INTRAMUSCULAR
Status: DISCONTINUED | OUTPATIENT
Start: 2022-07-11 | End: 2022-09-27

## 2022-07-11 RX ORDER — TRAMADOL HYDROCHLORIDE 50 MG/1
50 TABLET ORAL EVERY 6 HOURS PRN
Qty: 20 TABLET | Refills: 0 | Status: SHIPPED | OUTPATIENT
Start: 2022-07-11 | End: 2022-07-14

## 2022-07-11 RX ADMIN — TRIAMCINOLONE ACETONIDE 40 MG: 40 INJECTION, SUSPENSION INTRA-ARTICULAR; INTRAMUSCULAR at 14:47

## 2022-07-11 ASSESSMENT — PAIN SCALES - GENERAL: PAINLEVEL: MODERATE PAIN (5)

## 2022-07-11 NOTE — NURSING NOTE
[facility]   ORTHOPEDICS & SPORTS MEDICINE  [address] [place] [address]  Dept: [phone]  ______________________________________________________________________________    Patient: Nicole Slaughter   : 1945   MRN: [id number]   2022    INVASIVE PROCEDURE SAFETY CHECKLIST    Date: 22   Procedure:Bilateral knee Kenalog injection  Patient Name: Nicole Slaughter  MRN: [id number]  YOB: 1945    Action: Complete sections as appropriate. Any discrepancy results in a HARD COPY until resolved.     PRE PROCEDURE:  Patient ID verified with 2 identifiers (name and  or MRN): Yes  Procedure and site verified with patient/designee (when able): Yes  Accurate consent documentation in medical record: Yes  H&P (or appropriate assessment) documented in medical record: Yes  H&P must be up to 20 days prior to procedure and updates within 24 hours of procedure as applicable: NA  Relevant diagnostic and radiology test results appropriately labeled and displayed as applicable: NA  Procedure site(s) marked with provider initials: NA    TIMEOUT:  Time-Out performed immediately prior to starting procedure, including verbal and active participation of all team members addressing the following:Yes  * Correct patient identify  * Confirmed that the correct side and site are marked  * An accurate procedure consent form  * Agreement on the procedure to be done  * Correct patient position  * Relevant images and results are properly labeled and appropriately displayed  * The need to administer antibiotics or fluids for irrigation purposes during the procedure as applicable   * Safety precautions based on patient history or medication use    DURING PROCEDURE: Verification of correct person, site, and procedures any time the responsibility for care of the patient is transferred to another member of the care team.       Prior to injection, verified patient identity using patient's name and  date of birth.  Due to injection administration, patient instructed to remain in clinic for 15 minutes  afterwards, and to report any adverse reaction to me immediately.    Joint injection was performed.      Drug Amount Wasted:  None.  Vial/Syringe: Single dose vial  Expiration Date:  2/29/2024      Dany Laboy, EMT  July 11, 2022

## 2022-07-11 NOTE — PROGRESS NOTES
Large Joint Injection/Arthocentesis: bilateral knee    Date/Time: 7/11/2022 2:47 PM  Performed by: Cristobal Levi DO  Authorized by: Cristobal Levi DO     Indications:  Pain  Needle Size:  22 G  Guidance: landmark guided    Approach:  Anterolateral  Location:  Knee  Laterality:  Bilateral      Medications (Right):  40 mg triamcinolone 40 MG/ML  Medications (Left):  40 mg triamcinolone 40 MG/ML  Outcome:  Tolerated well, no immediate complications  Consent Given by:  Patient  Timeout: timeout called immediately prior to procedure    Prep: patient was prepped and draped in usual sterile fashion         PROCEDURE    Knee Injections - Intraarticular    The patient was informed of the risks and the benefits of the procedure and a written consent was signed.    The patient s left knee was prepped with chlorhexidine in sterile fashion.   40 mg of triamcinolone suspension was drawn up into a 5 mL syringe with 4 mL of 1% lidocaine.  Injection was performed using substerile technique.  A 1.5-inch 22-gauge needle was used to enter the lateral aspect of the left knee.  Injection performed successfully without difficulty.  There were no complications. The patient tolerated the procedure well. There was negligible bleeding.     The patient's right knee was prepped with chlorhexidine in sterile fashion.   40 mg of triamcinolone suspension was drawn up into a 5 mL syringe with 4 mL of 1% lidocaine.  Injection was performed using substerile technique.  A 1.5-inch 22-gauge needle was used to enter the lateral aspect of the right knee.  Injection performed successfully without difficulty.  There were no complications. The patient tolerated the procedure well. There was negligible bleeding.

## 2022-07-11 NOTE — PROGRESS NOTES
HISTORY OF PRESENT ILLNESS  Ms. Slaughter is a pleasant 77 year old female following up with bilateral knee osteoarthritis.  Ruby has been experiencing worsening knee pain over the past few months.  She has significant pain in both knees, her right knee does have some swelling and lack of range of motion.  She also is having some instability feelings in her right knee.  Her activities of daily living are becoming greatly impacted.     PHYSICAL EXAM  General  - normal appearance, in no obvious distress  HEENT  - conjunctivae not injected, moist mucous membranes  Musculoskeletal - right and left knee  - stance: mildly antalgic gait, genu varum  - inspection: generalized swelling, trace effusion bilaterally   - palpation: medial joint line tenderness bilaterally  - ROM: 100 degrees flexion, 0 degrees extension, painful active ROM  - strength: 5/5 in flexion, 5/5 in extension  - special tests:  (-) Lachman  (-) Fiorella  (-) varus at 0 and 30 degrees flexion  (-) valgus at 0 and 30 degrees flexion  Neuro  - no sensory or motor deficit, grossly normal coordination, normal muscle tone  Skin  - no ecchymosis, erythema, warmth, or induration, no obvious rash  Psych  - interactive, appropriate, normal mood and affect            ASSESSMENT & PLAN  Ms. Slaughter is a 77 year old female following up with bilateral knee osteoarthritis.    I reviewed her previous x-rays in the room with her.  She does have significant bilateral knee osteoarthritis.    We revisited our discussion centering around treatment options for her knee osteoarthritis.  I did repeat her knee injections today (see procedure note).    I am also referring her to our surgical partners to discuss total knee arthroplasty.    It was a pleasure seeing Elba Levi, , [facility]      This note was constructed using Dragon dictation software, please excuse any minor errors in spelling, grammar, or syntax.

## 2022-07-11 NOTE — LETTER
7/11/2022         RE: Nicole Slaughter  [address] [place] [address]        Dear Colleague,    Thank you for referring your patient, Nicole Slaughter, to the [facility] [place]. Please see a copy of my visit note below.    HISTORY OF PRESENT ILLNESS  Ms. Slaughter is a pleasant 77 year old female following up with bilateral knee osteoarthritis.  Ruby has been experiencing worsening knee pain over the past few months.  She has significant pain in both knees, her right knee does have some swelling and lack of range of motion.  She also is having some instability feelings in her right knee.  Her activities of daily living are becoming greatly impacted.     PHYSICAL EXAM  General  - normal appearance, in no obvious distress  HEENT  - conjunctivae not injected, moist mucous membranes  Musculoskeletal - right and left knee  - stance: mildly antalgic gait, genu varum  - inspection: generalized swelling, trace effusion bilaterally   - palpation: medial joint line tenderness bilaterally  - ROM: 100 degrees flexion, 0 degrees extension, painful active ROM  - strength: 5/5 in flexion, 5/5 in extension  - special tests:  (-) Lachman  (-) Fiorella  (-) varus at 0 and 30 degrees flexion  (-) valgus at 0 and 30 degrees flexion  Neuro  - no sensory or motor deficit, grossly normal coordination, normal muscle tone  Skin  - no ecchymosis, erythema, warmth, or induration, no obvious rash  Psych  - interactive, appropriate, normal mood and affect            ASSESSMENT & PLAN  Ms. Slaughter is a 77 year old female following up with bilateral knee osteoarthritis.    I reviewed her previous x-rays in the room with her.  She does have significant bilateral knee osteoarthritis.    We revisited our discussion centering around treatment options for her knee osteoarthritis.  I did repeat her knee injections today (see procedure note).    I am also referring her to our surgical partners to discuss  total knee arthroplasty.    It was a pleasure seeing Elba Levi DO, CAQSM      This note was constructed using Dragon dictation software, please excuse any minor errors in spelling, grammar, or syntax.      Large Joint Injection/Arthocentesis: bilateral knee    Date/Time: 7/11/2022 2:47 PM  Performed by: Cristobal Levi DO  Authorized by: Cristobal Levi DO     Indications:  Pain  Needle Size:  22 G  Guidance: landmark guided    Approach:  Anterolateral  Location:  Knee  Laterality:  Bilateral      Medications (Right):  40 mg triamcinolone 40 MG/ML  Medications (Left):  40 mg triamcinolone 40 MG/ML  Outcome:  Tolerated well, no immediate complications  Consent Given by:  Patient  Timeout: timeout called immediately prior to procedure    Prep: patient was prepped and draped in usual sterile fashion         PROCEDURE    Knee Injections - Intraarticular    The patient was informed of the risks and the benefits of the procedure and a written consent was signed.    The patient s left knee was prepped with chlorhexidine in sterile fashion.   40 mg of triamcinolone suspension was drawn up into a 5 mL syringe with 4 mL of 1% lidocaine.  Injection was performed using substerile technique.  A 1.5-inch 22-gauge needle was used to enter the lateral aspect of the left knee.  Injection performed successfully without difficulty.  There were no complications. The patient tolerated the procedure well. There was negligible bleeding.     The patient's right knee was prepped with chlorhexidine in sterile fashion.   40 mg of triamcinolone suspension was drawn up into a 5 mL syringe with 4 mL of 1% lidocaine.  Injection was performed using substerile technique.  A 1.5-inch 22-gauge needle was used to enter the lateral aspect of the right knee.  Injection performed successfully without difficulty.  There were no complications. The patient tolerated the procedure well. There was negligible bleeding.                  Again, thank you for allowing me to participate in the care of your patient.        Sincerely,        Cristobal Levi, DO

## 2022-07-11 NOTE — NURSING NOTE
Chief Complaint   Patient presents with     Left Knee - Pain     Right Knee - Pain       There were no vitals filed for this visit.    There is no height or weight on file to calculate BMI.      FLORIAN Saenz NREMT

## 2022-07-18 NOTE — TELEPHONE ENCOUNTER
DIAGNOSIS: bilateral knees   APPOINTMENT DATE: 07/26/2022   NOTES STATUS DETAILS   OFFICE NOTE from referring provider Internal 07/11/2022 Dr Levi [facility]    OFFICE NOTE from other specialist N/A    DISCHARGE SUMMARY from hospital N/A    DISCHARGE REPORT from the ER N/A    OPERATIVE REPORT N/A    MEDICATION LIST N/A    EMG (for Spine) N/A    IMPLANT RECORD/STICKER N/A    LABS     CBC/DIFF N/A    CULTURES N/A    INJECTIONS DONE IN RADIOLOGY N/A    MRI N/A    CT SCAN N/A    XRAYS (IMAGES & REPORTS) Internal 12/07/2020 bilateral knees   TUMOR     PATHOLOGY  Slides & report N/A

## 2022-07-23 DIAGNOSIS — F32.1 MODERATE MAJOR DEPRESSION (H): ICD-10-CM

## 2022-07-23 ASSESSMENT — KOOS JR
STRAIGHTENING KNEE FULLY: MODERATE
KOOS JR SCORING: 54.84
HOW SEVERE IS YOUR KNEE STIFFNESS AFTER FIRST WAKING IN MORNING: MODERATE
GOING UP OR DOWN STAIRS: MODERATE
RISING FROM SITTING: SEVERE
BENDING TO THE FLOOR TO PICK UP OBJECT: MILD
TWISING OR PIVOTING ON KNEE: SEVERE

## 2022-07-25 DIAGNOSIS — G89.29 BILATERAL CHRONIC KNEE PAIN: Primary | ICD-10-CM

## 2022-07-25 DIAGNOSIS — M25.562 BILATERAL CHRONIC KNEE PAIN: Primary | ICD-10-CM

## 2022-07-25 DIAGNOSIS — M25.561 BILATERAL CHRONIC KNEE PAIN: Primary | ICD-10-CM

## 2022-07-25 RX ORDER — DULOXETIN HYDROCHLORIDE 30 MG/1
CAPSULE, DELAYED RELEASE ORAL
Qty: 120 CAPSULE | Refills: 0 | Status: SHIPPED | OUTPATIENT
Start: 2022-07-25 | End: 2022-08-19

## 2022-07-25 NOTE — TELEPHONE ENCOUNTER
"Pending Prescriptions:                       Disp   Refills    DULoxetine (CYMBALTA) 30 MG capsule [Pharm*120 ca*0        Si mg every morning and 30 mg every evening for the           next week, then 60 mg twice daily    Routing refill request to provider for review/approval because:  PHQ-9 score:    PHQ 2022   PHQ-9 Total Score 5   Q9: Thoughts of better off dead/self-harm past 2 weeks Not at all                 Requested Prescriptions   Pending Prescriptions Disp Refills    DULoxetine (CYMBALTA) 30 MG capsule [Pharmacy Med Name: DULoxetine HCl Oral Capsule Delayed Release Particles 30 MG] 120 capsule 0     Si mg every morning and 30 mg every evening for the next week, then 60 mg twice daily        Serotonin-Norepinephrine Reuptake Inhibitors  Failed - 2022  2:00 AM        Failed - PHQ-9 score of less than 5 in past 6 months     Please review last PHQ-9 score.           Passed - Blood pressure under 140/90 in past 12 months       BP Readings from Last 3 Encounters:   22 134/68   22 134/76   21 126/72                 Passed - Medication is active on med list        Passed - Patient is age 18 or older        Passed - No active pregnancy on record        Passed - No positive pregnancy test in past 12 months        Passed - Recent (6 mo) or future (30 days) visit within the authorizing provider's specialty     Patient had office visit in the last 6 months or has a visit in the next 30 days with authorizing provider or within the authorizing provider's specialty.  See \"Patient Info\" tab in inbasket, or \"Choose Columns\" in Meds & Orders section of the refill encounter.                        "

## 2022-07-26 ENCOUNTER — OFFICE VISIT (OUTPATIENT)
Dept: ORTHOPEDICS | Facility: CLINIC | Age: 77
End: 2022-07-26
Payer: COMMERCIAL

## 2022-07-26 ENCOUNTER — PRE VISIT (OUTPATIENT)
Dept: ORTHOPEDICS | Facility: CLINIC | Age: 77
End: 2022-07-26

## 2022-07-26 ENCOUNTER — ANCILLARY PROCEDURE (OUTPATIENT)
Dept: GENERAL RADIOLOGY | Facility: CLINIC | Age: 77
End: 2022-07-26
Attending: ORTHOPAEDIC SURGERY
Payer: COMMERCIAL

## 2022-07-26 VITALS — BODY MASS INDEX: 27.6 KG/M2 | HEIGHT: 62 IN | WEIGHT: 150 LBS

## 2022-07-26 DIAGNOSIS — M25.561 CHRONIC PAIN OF RIGHT KNEE: Primary | ICD-10-CM

## 2022-07-26 DIAGNOSIS — G89.29 CHRONIC PAIN OF RIGHT KNEE: Primary | ICD-10-CM

## 2022-07-26 DIAGNOSIS — G89.29 BILATERAL CHRONIC KNEE PAIN: ICD-10-CM

## 2022-07-26 DIAGNOSIS — M25.561 BILATERAL CHRONIC KNEE PAIN: ICD-10-CM

## 2022-07-26 DIAGNOSIS — M25.562 BILATERAL CHRONIC KNEE PAIN: ICD-10-CM

## 2022-07-26 PROCEDURE — 73562 X-RAY EXAM OF KNEE 3: CPT | Mod: LT | Performed by: RADIOLOGY

## 2022-07-26 PROCEDURE — 99204 OFFICE O/P NEW MOD 45 MIN: CPT | Performed by: ORTHOPAEDIC SURGERY

## 2022-07-26 ASSESSMENT — PAIN SCALES - GENERAL: PAINLEVEL: EXTREME PAIN (8)

## 2022-07-26 NOTE — LETTER
7/26/2022         RE: Nicole Slaughter  [address] [place] [address]        Dear Colleague,    Thank you for referring your patient, Nicole Slaughter, to the [facility]. Please see a copy of my visit note below.    Assessment: This is a 77 year old with advanced OA right knee associate dwith severe symptoms. Recent knee injection with relief. We discussed the diagnosis and the treatment options including living with it, additional injections and total knee. We spent approximately 20 minutes discussing the risks and benefits of total knee arthroplasty.  We reviewed the proposed surgical plan.  The discussion included but was not limited to the following:  Blood clots, blood clots to the lungs, injury to blood vessels and nerves, the literature as it pertains to known problems with leg length discrepancy, stiffness, anterior knee pain, and infection. We discussed the post-operative recovery for the typical patient, return to driving, and return to normal activities.  All the patient's questions were answered to the best of my ability and would like to proceed.    Plan:  She is going to consider her options.     Chief Complaint: Consult (Right knee pain )      Physician:  No ref. provider found    HPI: Nicole Slaughter is a 77 year old female who presents today for evaluation of her knees r> L    Symptom Profile  Location of symptoms:  anterior  Onset: associates with a twist and fall about 4 years ago  Trend: getting worse   Duration of symptoms: about 4 years   Quality of symptoms: aching, sharp/stabbing  Severity: severe  Alleviate:activity modification   Exacerbating: activities   Previous Treatments: Previous treatments include activity modification, oral pain medication, multiple bilateral injections (most recently in the past month     Current Status:  Results of the patient s Hip Disability and Osteoarthritis Outcome Score (HOOS)  are as follows (0-100 scales with 100  being the theoretical best):  Pain: 47   Symptoms:46  ADLs:57   Sports/Recreation:10  Quality of Life:12.5  ([email or web address].nu/)  [facility] Activity Score:4    MEDICAL HISTORY:   Past Medical History:   Diagnosis Date     Cervicalgia      Depressive disorder      Generalized osteoarthrosis, unspecified site      Migraine, unspecified, without mention of intractable migraine without mention of status migrainosus      Thyroid nodule 12/01/2016     Unspecified essential hypertension        Medications:     Current Outpatient Medications:      acyclovir (ZOVIRAX) 400 MG tablet, Take 1 tablet (400 mg) by mouth 2 times daily, Disp: 60 tablet, Rfl: 3     atorvastatin (LIPITOR) 40 MG tablet, TAKE ONE TABLET BY MOUTH TWICE WEEKLY, Disp: 24 tablet, Rfl: 3     CALCIUM-VITAMIN D PO, Take by mouth daily, Disp: , Rfl:      chlorthalidone (HYGROTON) 25 MG tablet, TAKE HALF TABLET BY MOUTH DAILY, Disp: 45 tablet, Rfl: 3     DULoxetine (CYMBALTA) 30 MG capsule, 60 mg every morning and 30 mg every evening for the next week, then 60 mg twice daily, Disp: 120 capsule, Rfl: 0     gabapentin (NEURONTIN) 100 MG capsule, Take 1 capsule (100 mg) by mouth 3 times daily TAKE ONE CAPSULE BY MOUTH THREE TIMES DAILY, Disp: 270 capsule, Rfl: 3     MAGNESIUM PO, Take by mouth daily, Disp: , Rfl:      omeprazole (PRILOSEC) 20 MG DR capsule, Take 1 capsule (20 mg) by mouth daily, Disp: 90 capsule, Rfl: 3     SUMAtriptan (IMITREX) 50 MG tablet, Take one tablet at onset of headache. May repeat in 2 hours as needed. Max 2/day, Disp: 18 tablet, Rfl: 3     tiZANidine (ZANAFLEX) 2 MG tablet, Take 1 tablet (2 mg) by mouth 2 times daily as needed for muscle spasms, Disp: 90 tablet, Rfl: 0    Current Facility-Administered Medications:      triamcinolone (KENALOG-40) injection 40 mg, 40 mg, , , Cristobal Levi DO, 40 mg at 07/11/22 1447     triamcinolone (KENALOG-40) injection 40 mg, 40 mg, , , Cristobal Levi DO, 40 mg at 07/11/22 1447     triamcinolone  (KENALOG-40) injection 40 mg, 40 mg, , , Cristobal Levi, DO, 40 mg at 03/28/22 1429     triamcinolone (KENALOG-40) injection 40 mg, 40 mg, , , Cristobal Levi DO, 40 mg at 03/28/22 1429     triamcinolone (KENALOG-40) injection 40 mg, 40 mg, , , Nessa Esposito MD, 40 mg at 11/18/21 1324     triamcinolone (KENALOG-40) injection 40 mg, 40 mg, , , Nessa Esposito MD, 40 mg at 11/18/21 1324     triamcinolone (KENALOG-40) injection 40 mg, 40 mg, , , Cristobal Levi DO, 40 mg at 07/12/21 1324     triamcinolone (KENALOG-40) injection 40 mg, 40 mg, , , Cristobal Levi DO, 40 mg at 07/12/21 1324     triamcinolone (KENALOG-40) injection 40 mg, 40 mg, , , Nessa Esposito MD, 40 mg at 04/12/21 1454     triamcinolone (KENALOG-40) injection 40 mg, 40 mg, , , Nessa Esposito MD, 40 mg at 04/12/21 1454     triamcinolone (KENALOG-40) injection 40 mg, 40 mg, , , Nessa Esposito MD, 40 mg at 12/07/20 1422     triamcinolone (KENALOG-40) injection 40 mg, 40 mg, , , Nessa Esposito MD, 40 mg at 12/07/20 1422    Allergies: Tylenol w/codeine [acetaminophen-codeine], Percocet [oxycodone-acetaminophen], and Sulfa drugs    SURGICAL HISTORY:   Past Surgical History:   Procedure Laterality Date     COLONOSCOPY  2010?     EYE SURGERY  2017-18     HYSTERECTOMY, TANG       ORTHOPEDIC SURGERY       SOFT TISSUE SURGERY         FAMILY HISTORY:   Family History   Problem Relation Age of Onset     Arthritis Mother      Lipids Mother      Hypertension Mother      Breast Cancer Mother      Anesthesia Reaction Mother      Heart Disease Father      Cerebrovascular Disease Father      Arthritis Maternal Grandmother      Breast Cancer Maternal Aunt      Breast Cancer Maternal Aunt      Diabetes No family hx of      Coronary Artery Disease No family hx of      Hyperlipidemia No family hx of      Prostate Cancer No family hx of      Other Cancer No family hx of      Anxiety Disorder No  "family hx of      Mental Illness No family hx of      Substance Abuse No family hx of        SOCIAL HISTORY:   Social History     Tobacco Use     Smoking status: Never Smoker     Smokeless tobacco: Never Used   Substance Use Topics     Alcohol use: Not Currently     Comment: 5 drinks per week / wine    , lives with spouse     REVIEW OF SYSTEMS:  The comprehensive review of systems from the intake form was reviewed with the patient.  No fever, weight change or fatigue. No dry eyes. No oral ulcers, sore throat or voice change. No palpitations, syncope, angina or edema.  No chest pain, excessive sleepiness, shortness of breath or hemoptysis.   No abdominal pain, nausea, vomiting, diarrhea or heartburn.  No skin rash. No focal weakness or numbness. No bleeding or lymphadenopathy. No rhinitis or hives.     Exam:  On physical examination the patient appears the stated age, is in no acute distress, affectThe is appropriate, and breathing is non-labored.  Vitals are documented in the EMR and have been reviewed:    Ht 1.575 m (5' 2\")   Wt 68 kg (150 lb)   BMI 27.44 kg/m    5' 2\"  Body mass index is 27.44 kg/m .    Rises from chair:easily   Gait: mild antalgic with less time on th right   Trendelenburg test:  Gains the exam table: easily   Right  Knee  Appearance: benign  Clinical alignment: varus   Effusion: mild  Tenderness to palpation:medial more than lateral joint line  Extension: 5  Flexion: 95  Collateral ligaments: intact  Cruciate ligaments: grossly intact     Left Knee  Appearance: benign  Clinical alignment: mild varus   Effusion: no  Tenderness to palpation: medial more than lateral   Extension: 5  Flexion: 100  Collateral ligaments: intact  Cruciate ligaments: grossly intact     Distally, the circulatory, motor, and sensation exam is intact with 5/5 EHL, gastroc-soleus, and tibialis anterior.  Sensation to light touch is intact.  Dorsalis pedis and posterior tibialis pulses are palpable.  There are no " sores on the feet, no bruising, and no lymphedema.    X-rays:   Study Result    Narrative & Impression   3 views right and left knee radiographs 7/26/2022 6:20 PM     History: Bilateral chronic knee pain; Bilateral chronic knee pain;  Bilateral chronic knee pain      Comparison: 12/7/2020     Findings:     AP view of bilateral knees, lateral and patellofemoral views of the  right and left knee were obtained.      Left: No acute osseous abnormality. Small joint effusion.     Moderate medial compartment joint space loss, similar to prior.  Posterior joint body. Patellofemoral spurring. Moderate patellofemoral  joint space loss.     No patellar tilt or lateral subluxation.  Soft tissue is unremarkable.     Right:  No acute osseous abnormality. Small to moderate joint  effusion.     Moderate severe medial compartment joint space loss, mildly  progressed. Patellofemoral spurring with lateral patellofemoral joint  space loss.     No patellar tilt or lateral subluxation.  Soft tissue is unremarkable.                                                                      Impression:  1. No acute osseous abnormality.  2. Right worse than left medial compartment joint space loss.  Bilateral patellofemoral joint space loss.     PABLO HAHN MD (Joe)                Again, thank you for allowing me to participate in the care of your patient.        Sincerely,        Leonardo Nelson MD

## 2022-07-26 NOTE — NURSING NOTE
"Nicole Slaughter's chief complaint for this visit includes:  Chief Complaint   Patient presents with     Consult     Right knee pain      PCP: Lizett Parikh    Referring Provider:  No referring provider defined for this encounter.    Ht 1.575 m (5' 2\")   Wt 68 kg (150 lb)   BMI 27.44 kg/m    Extreme Pain (8)     Pain increases with: Pain with going up stairs  Previous surgeries: No previous knee surgeries; injection 7/11/22  Treatments done: Injection 7/11/22; bracing  Imaging completed: XR today      Mulu Reeves, BRIANA  "

## 2022-07-26 NOTE — PROGRESS NOTES
Assessment: This is a 77 year old with advanced OA right knee associate dwith severe symptoms. Recent knee injection with relief. We discussed the diagnosis and the treatment options including living with it, additional injections and total knee. We spent approximately 20 minutes discussing the risks and benefits of total knee arthroplasty.  We reviewed the proposed surgical plan.  The discussion included but was not limited to the following:  Blood clots, blood clots to the lungs, injury to blood vessels and nerves, the literature as it pertains to known problems with leg length discrepancy, stiffness, anterior knee pain, and infection. We discussed the post-operative recovery for the typical patient, return to driving, and return to normal activities.  All the patient's questions were answered to the best of my ability and would like to proceed.    Plan:  She is going to consider her options.     Chief Complaint: Consult (Right knee pain )      Physician:  No ref. provider found    HPI: Nicole Slaughter is a 77 year old female who presents today for evaluation of her knees r> L    Symptom Profile  Location of symptoms:  anterior  Onset: associates with a twist and fall about 4 years ago  Trend: getting worse   Duration of symptoms: about 4 years   Quality of symptoms: aching, sharp/stabbing  Severity: severe  Alleviate:activity modification   Exacerbating: activities   Previous Treatments: Previous treatments include activity modification, oral pain medication, multiple bilateral injections (most recently in the past month     Current Status:  Results of the patient s Hip Disability and Osteoarthritis Outcome Score (HOOS)  are as follows (0-100 scales with 100 being the theoretical best):  Pain: 47   Symptoms:46  ADLs:57   Sports/Recreation:10  Quality of Life:12.5  ([email or web address].[email or web address]/)  [facility] Activity Score:4    MEDICAL HISTORY:   Past Medical History:   Diagnosis Date     Cervicalgia      Depressive disorder       Generalized osteoarthrosis, unspecified site      Migraine, unspecified, without mention of intractable migraine without mention of status migrainosus      Thyroid nodule 12/01/2016     Unspecified essential hypertension        Medications:     Current Outpatient Medications:      acyclovir (ZOVIRAX) 400 MG tablet, Take 1 tablet (400 mg) by mouth 2 times daily, Disp: 60 tablet, Rfl: 3     atorvastatin (LIPITOR) 40 MG tablet, TAKE ONE TABLET BY MOUTH TWICE WEEKLY, Disp: 24 tablet, Rfl: 3     CALCIUM-VITAMIN D PO, Take by mouth daily, Disp: , Rfl:      chlorthalidone (HYGROTON) 25 MG tablet, TAKE HALF TABLET BY MOUTH DAILY, Disp: 45 tablet, Rfl: 3     DULoxetine (CYMBALTA) 30 MG capsule, 60 mg every morning and 30 mg every evening for the next week, then 60 mg twice daily, Disp: 120 capsule, Rfl: 0     gabapentin (NEURONTIN) 100 MG capsule, Take 1 capsule (100 mg) by mouth 3 times daily TAKE ONE CAPSULE BY MOUTH THREE TIMES DAILY, Disp: 270 capsule, Rfl: 3     MAGNESIUM PO, Take by mouth daily, Disp: , Rfl:      omeprazole (PRILOSEC) 20 MG DR capsule, Take 1 capsule (20 mg) by mouth daily, Disp: 90 capsule, Rfl: 3     SUMAtriptan (IMITREX) 50 MG tablet, Take one tablet at onset of headache. May repeat in 2 hours as needed. Max 2/day, Disp: 18 tablet, Rfl: 3     tiZANidine (ZANAFLEX) 2 MG tablet, Take 1 tablet (2 mg) by mouth 2 times daily as needed for muscle spasms, Disp: 90 tablet, Rfl: 0    Current Facility-Administered Medications:      triamcinolone (KENALOG-40) injection 40 mg, 40 mg, , , Cristobal Levi, DO, 40 mg at 07/11/22 1447     triamcinolone (KENALOG-40) injection 40 mg, 40 mg, , , Cristobal Levi, DO, 40 mg at 07/11/22 1447     triamcinolone (KENALOG-40) injection 40 mg, 40 mg, , , Cirstobal Levi, DO, 40 mg at 03/28/22 1429     triamcinolone (KENALOG-40) injection 40 mg, 40 mg, , , Cristobal Levi, DO, 40 mg at 03/28/22 1429     triamcinolone (KENALOG-40) injection 40 mg, 40 mg, , , Shaker,  Nessa Batista MD, 40 mg at 11/18/21 1324     triamcinolone (KENALOG-40) injection 40 mg, 40 mg, , , Nessa Esposito MD, 40 mg at 11/18/21 1324     triamcinolone (KENALOG-40) injection 40 mg, 40 mg, , , Cristobal Levi, DO, 40 mg at 07/12/21 1324     triamcinolone (KENALOG-40) injection 40 mg, 40 mg, , , Cristobal Levi, DO, 40 mg at 07/12/21 1324     triamcinolone (KENALOG-40) injection 40 mg, 40 mg, , , Nessa Esposito MD, 40 mg at 04/12/21 1454     triamcinolone (KENALOG-40) injection 40 mg, 40 mg, , , Nessa Esposito MD, 40 mg at 04/12/21 1454     triamcinolone (KENALOG-40) injection 40 mg, 40 mg, , , Nessa Esposito MD, 40 mg at 12/07/20 1422     triamcinolone (KENALOG-40) injection 40 mg, 40 mg, , , Nessa Esposito MD, 40 mg at 12/07/20 1422    Allergies: Tylenol w/codeine [acetaminophen-codeine], Percocet [oxycodone-acetaminophen], and Sulfa drugs    SURGICAL HISTORY:   Past Surgical History:   Procedure Laterality Date     COLONOSCOPY  2010?     EYE SURGERY  2017-18     HYSTERECTOMY, TANG       ORTHOPEDIC SURGERY       SOFT TISSUE SURGERY         FAMILY HISTORY:   Family History   Problem Relation Age of Onset     Arthritis Mother      Lipids Mother      Hypertension Mother      Breast Cancer Mother      Anesthesia Reaction Mother      Heart Disease Father      Cerebrovascular Disease Father      Arthritis Maternal Grandmother      Breast Cancer Maternal Aunt      Breast Cancer Maternal Aunt      Diabetes No family hx of      Coronary Artery Disease No family hx of      Hyperlipidemia No family hx of      Prostate Cancer No family hx of      Other Cancer No family hx of      Anxiety Disorder No family hx of      Mental Illness No family hx of      Substance Abuse No family hx of        SOCIAL HISTORY:   Social History     Tobacco Use     Smoking status: Never Smoker     Smokeless tobacco: Never Used   Substance Use Topics     Alcohol use: Not  "Currently     Comment: 5 drinks per week / wine    , lives with spouse     REVIEW OF SYSTEMS:  The comprehensive review of systems from the intake form was reviewed with the patient.  No fever, weight change or fatigue. No dry eyes. No oral ulcers, sore throat or voice change. No palpitations, syncope, angina or edema.  No chest pain, excessive sleepiness, shortness of breath or hemoptysis.   No abdominal pain, nausea, vomiting, diarrhea or heartburn.  No skin rash. No focal weakness or numbness. No bleeding or lymphadenopathy. No rhinitis or hives.     Exam:  On physical examination the patient appears the stated age, is in no acute distress, affectThe is appropriate, and breathing is non-labored.  Vitals are documented in the EMR and have been reviewed:    Ht 1.575 m (5' 2\")   Wt 68 kg (150 lb)   BMI 27.44 kg/m    5' 2\"  Body mass index is 27.44 kg/m .    Rises from chair:easily   Gait: mild antalgic with less time on th right   Trendelenburg test:  Gains the exam table: easily   Right  Knee  Appearance: benign  Clinical alignment: varus   Effusion: mild  Tenderness to palpation:medial more than lateral joint line  Extension: 5  Flexion: 95  Collateral ligaments: intact  Cruciate ligaments: grossly intact     Left Knee  Appearance: benign  Clinical alignment: mild varus   Effusion: no  Tenderness to palpation: medial more than lateral   Extension: 5  Flexion: 100  Collateral ligaments: intact  Cruciate ligaments: grossly intact     Distally, the circulatory, motor, and sensation exam is intact with 5/5 EHL, gastroc-soleus, and tibialis anterior.  Sensation to light touch is intact.  Dorsalis pedis and posterior tibialis pulses are palpable.  There are no sores on the feet, no bruising, and no lymphedema.    X-rays:   Study Result    Narrative & Impression   3 views right and left knee radiographs 7/26/2022 6:20 PM     History: Bilateral chronic knee pain; Bilateral chronic knee pain;  Bilateral chronic " knee pain      Comparison: 12/7/2020     Findings:     AP view of bilateral knees, lateral and patellofemoral views of the  right and left knee were obtained.      Left: No acute osseous abnormality. Small joint effusion.     Moderate medial compartment joint space loss, similar to prior.  Posterior joint body. Patellofemoral spurring. Moderate patellofemoral  joint space loss.     No patellar tilt or lateral subluxation.  Soft tissue is unremarkable.     Right:  No acute osseous abnormality. Small to moderate joint  effusion.     Moderate severe medial compartment joint space loss, mildly  progressed. Patellofemoral spurring with lateral patellofemoral joint  space loss.     No patellar tilt or lateral subluxation.  Soft tissue is unremarkable.                                                                      Impression:  1. No acute osseous abnormality.  2. Right worse than left medial compartment joint space loss.  Bilateral patellofemoral joint space loss.     PABLO HAHN MD (Joe)

## 2022-08-09 ENCOUNTER — TRANSFERRED RECORDS (OUTPATIENT)
Dept: FAMILY MEDICINE | Facility: CLINIC | Age: 77
End: 2022-08-09

## 2022-08-17 ENCOUNTER — TELEPHONE (OUTPATIENT)
Dept: FAMILY MEDICINE | Facility: CLINIC | Age: 77
End: 2022-08-17

## 2022-08-17 NOTE — TELEPHONE ENCOUNTER
Reason for Call:  Appointment Request    Patient requesting this type of appt: Pre-op    Requested provider: Lizett Parikh    Reason patient unable to be scheduled: Not with their preferred provider    When does patient want to be seen/preferred time: Within 30 days before October 5th    Comments: Would it be ok to use on of the Same Day spots    Could we send this information to you in [employer] or would you prefer to receive a phone call?:   Patient would prefer a phone call   Okay to leave a detailed message?: Yes at Cell number on file:    Telephone Information:   Mobile [phone]       Call taken on 8/17/2022 at 12:16 PM by Jaclyn Palacios

## 2022-08-18 ENCOUNTER — MYC MEDICAL ADVICE (OUTPATIENT)
Dept: FAMILY MEDICINE | Facility: CLINIC | Age: 77
End: 2022-08-18

## 2022-08-18 DIAGNOSIS — F32.1 MODERATE MAJOR DEPRESSION (H): ICD-10-CM

## 2022-08-19 RX ORDER — DULOXETIN HYDROCHLORIDE 30 MG/1
30 CAPSULE, DELAYED RELEASE ORAL 2 TIMES DAILY
Qty: 120 CAPSULE | Refills: 0
Start: 2022-08-19 | End: 2022-09-27

## 2022-09-27 ENCOUNTER — OFFICE VISIT (OUTPATIENT)
Dept: FAMILY MEDICINE | Facility: CLINIC | Age: 77
End: 2022-09-27
Payer: COMMERCIAL

## 2022-09-27 VITALS
TEMPERATURE: 98.5 F | RESPIRATION RATE: 16 BRPM | OXYGEN SATURATION: 98 % | DIASTOLIC BLOOD PRESSURE: 82 MMHG | SYSTOLIC BLOOD PRESSURE: 136 MMHG | HEIGHT: 62 IN | BODY MASS INDEX: 25.89 KG/M2 | WEIGHT: 140.7 LBS | HEART RATE: 78 BPM

## 2022-09-27 DIAGNOSIS — G43.009 MIGRAINE WITHOUT AURA AND WITHOUT STATUS MIGRAINOSUS, NOT INTRACTABLE: ICD-10-CM

## 2022-09-27 DIAGNOSIS — M17.11 PRIMARY OSTEOARTHRITIS OF RIGHT KNEE: ICD-10-CM

## 2022-09-27 DIAGNOSIS — E04.1 THYROID NODULE: ICD-10-CM

## 2022-09-27 DIAGNOSIS — E78.5 HYPERLIPIDEMIA LDL GOAL <130: ICD-10-CM

## 2022-09-27 DIAGNOSIS — I10 HYPERTENSION GOAL BP (BLOOD PRESSURE) < 140/90: ICD-10-CM

## 2022-09-27 DIAGNOSIS — Z79.899 ENCOUNTER FOR LONG-TERM (CURRENT) USE OF MEDICATIONS: ICD-10-CM

## 2022-09-27 DIAGNOSIS — F32.A DEPRESSIVE DISORDER IN REMISSION: ICD-10-CM

## 2022-09-27 DIAGNOSIS — Z01.818 PREOP GENERAL PHYSICAL EXAM: Primary | ICD-10-CM

## 2022-09-27 DIAGNOSIS — Z12.31 VISIT FOR SCREENING MAMMOGRAM: ICD-10-CM

## 2022-09-27 LAB
ANION GAP SERPL CALCULATED.3IONS-SCNC: 4 MMOL/L (ref 3–14)
BASOPHILS # BLD AUTO: 0.1 10E3/UL (ref 0–0.2)
BASOPHILS NFR BLD AUTO: 1 %
BUN SERPL-MCNC: 17 MG/DL (ref 7–30)
CALCIUM SERPL-MCNC: 9.4 MG/DL (ref 8.5–10.1)
CHLORIDE BLD-SCNC: 105 MMOL/L (ref 94–109)
CO2 SERPL-SCNC: 30 MMOL/L (ref 20–32)
CREAT SERPL-MCNC: 0.65 MG/DL (ref 0.52–1.04)
EOSINOPHIL # BLD AUTO: 0.2 10E3/UL (ref 0–0.7)
EOSINOPHIL NFR BLD AUTO: 3 %
ERYTHROCYTE [DISTWIDTH] IN BLOOD BY AUTOMATED COUNT: 13.8 % (ref 10–15)
GFR SERPL CREATININE-BSD FRML MDRD: >90 ML/MIN/1.73M2
GLUCOSE BLD-MCNC: 91 MG/DL (ref 70–99)
HCT VFR BLD AUTO: 37.7 % (ref 35–47)
HGB BLD-MCNC: 12.1 G/DL (ref 11.7–15.7)
IMM GRANULOCYTES # BLD: 0 10E3/UL
IMM GRANULOCYTES NFR BLD: 0 %
LYMPHOCYTES # BLD AUTO: 1.2 10E3/UL (ref 0.8–5.3)
LYMPHOCYTES NFR BLD AUTO: 18 %
MCH RBC QN AUTO: 29.2 PG (ref 26.5–33)
MCHC RBC AUTO-ENTMCNC: 32.1 G/DL (ref 31.5–36.5)
MCV RBC AUTO: 91 FL (ref 78–100)
MONOCYTES # BLD AUTO: 0.6 10E3/UL (ref 0–1.3)
MONOCYTES NFR BLD AUTO: 9 %
NEUTROPHILS # BLD AUTO: 4.7 10E3/UL (ref 1.6–8.3)
NEUTROPHILS NFR BLD AUTO: 69 %
PLATELET # BLD AUTO: 239 10E3/UL (ref 150–450)
POTASSIUM BLD-SCNC: 4.1 MMOL/L (ref 3.4–5.3)
RBC # BLD AUTO: 4.14 10E6/UL (ref 3.8–5.2)
SODIUM SERPL-SCNC: 139 MMOL/L (ref 133–144)
WBC # BLD AUTO: 6.8 10E3/UL (ref 4–11)

## 2022-09-27 PROCEDURE — 93000 ELECTROCARDIOGRAM COMPLETE: CPT | Performed by: FAMILY MEDICINE

## 2022-09-27 PROCEDURE — 85025 COMPLETE CBC W/AUTO DIFF WBC: CPT | Performed by: FAMILY MEDICINE

## 2022-09-27 PROCEDURE — 36415 COLL VENOUS BLD VENIPUNCTURE: CPT | Performed by: FAMILY MEDICINE

## 2022-09-27 PROCEDURE — 99214 OFFICE O/P EST MOD 30 MIN: CPT | Performed by: FAMILY MEDICINE

## 2022-09-27 PROCEDURE — 80048 BASIC METABOLIC PNL TOTAL CA: CPT | Performed by: FAMILY MEDICINE

## 2022-09-27 RX ORDER — TRAMADOL HYDROCHLORIDE 50 MG/1
50 TABLET ORAL EVERY 6 HOURS PRN
Qty: 10 TABLET | Refills: 0 | COMMUNITY
Start: 2022-09-27 | End: 2023-09-20

## 2022-09-27 ASSESSMENT — ANXIETY QUESTIONNAIRES
2. NOT BEING ABLE TO STOP OR CONTROL WORRYING: SEVERAL DAYS
GAD7 TOTAL SCORE: 3
5. BEING SO RESTLESS THAT IT IS HARD TO SIT STILL: NOT AT ALL
3. WORRYING TOO MUCH ABOUT DIFFERENT THINGS: NOT AT ALL
4. TROUBLE RELAXING: NOT AT ALL
6. BECOMING EASILY ANNOYED OR IRRITABLE: SEVERAL DAYS
1. FEELING NERVOUS, ANXIOUS, OR ON EDGE: SEVERAL DAYS
7. FEELING AFRAID AS IF SOMETHING AWFUL MIGHT HAPPEN: NOT AT ALL
IF YOU CHECKED OFF ANY PROBLEMS ON THIS QUESTIONNAIRE, HOW DIFFICULT HAVE THESE PROBLEMS MADE IT FOR YOU TO DO YOUR WORK, TAKE CARE OF THINGS AT HOME, OR GET ALONG WITH OTHER PEOPLE: NOT DIFFICULT AT ALL
GAD7 TOTAL SCORE: 3
8. IF YOU CHECKED OFF ANY PROBLEMS, HOW DIFFICULT HAVE THESE MADE IT FOR YOU TO DO YOUR WORK, TAKE CARE OF THINGS AT HOME, OR GET ALONG WITH OTHER PEOPLE?: NOT DIFFICULT AT ALL
7. FEELING AFRAID AS IF SOMETHING AWFUL MIGHT HAPPEN: NOT AT ALL
GAD7 TOTAL SCORE: 3

## 2022-09-27 ASSESSMENT — PATIENT HEALTH QUESTIONNAIRE - PHQ9
SUM OF ALL RESPONSES TO PHQ QUESTIONS 1-9: 2
SUM OF ALL RESPONSES TO PHQ QUESTIONS 1-9: 2
10. IF YOU CHECKED OFF ANY PROBLEMS, HOW DIFFICULT HAVE THESE PROBLEMS MADE IT FOR YOU TO DO YOUR WORK, TAKE CARE OF THINGS AT HOME, OR GET ALONG WITH OTHER PEOPLE: NOT DIFFICULT AT ALL

## 2022-09-27 NOTE — PROGRESS NOTES
[facility]  [address], [address]  [place] [address]  Phone: [phone]  Fax: [phone]  Primary Provider: Lizett Gomez  Pre-op Performing Provider: LIZETT GOMEZ      PREOPERATIVE EVALUATION:  Today's date: 9/27/2022    Nicole Slaughter is a 77 year old female who presents for a preoperative evaluation.    Surgical Information:  Surgery/Procedure: Total knee replacement, Right knee  Surgery Location: [facility]  Surgeon: Cristobal Aragon MD  Surgery Date: 10/05/2022  Time of Surgery: TBD  Where patient plans to recover: At a TCU (Transitional Care Unit)  Fax number for surgical facility: [phone]    Type of Anesthesia Anticipated: Spinal    Assessment & Plan     The proposed surgical procedure is considered INTERMEDIATE risk.    Preop general physical exam  Primary osteoarthritis of right knee  Patient schedule for knee replacement  She has tried over the counter medication as well as injections.   Has continued discomfort.     Hypertension goal BP (blood pressure) < 140/90  Doing well. Well controlled. Tolerating medication.  No change in plan.    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 Well controlled. Tolerating medication.  No change in plan.      Hyperlipidemia LDL goal <130  Doing well. Well controlled. Tolerating medication.  No change in plan.      Migraine without aura and without status migrainosus, not intractable  Doing well. Well controlled. Tolerating medication.  No change in plan.      Thyroid nodule  Patient is due at this time for recheck of her thyroid nodule.   Ultrasound ordered   - US Thyroid; Future    Visit for screening mammogram  Due. Order placed.   - MA SCREENING DIGITAL BILAT - Future  (s+30); Future      Encounter for long-term (current)  use of medications  Will notify of results.   - CBC with platelets and differential; Future  - CBC with platelets and differential    Possible Sleep Apnea: patient has not had concerns since weight loss.       Risks and Recommendations:  The patient has the following additional risks and recommendations for perioperative complications:   - No identified additional risk factors other than previously addressed    Medication Instructions:  Patient is to take all scheduled medications on the day of surgery    RECOMMENDATION:  APPROVAL GIVEN to proceed with proposed procedure, without further diagnostic evaluation.                      Subjective     HPI related to upcoming procedure: patient has had long standing knee pain. She has used over the counter medication and had injections. Now scheduled for replacement.     Preop Questions 9/20/2022   1. Have you ever had a heart attack or stroke? No   2. Have you ever had surgery on your heart or blood vessels, such as a stent placement, a coronary artery bypass, or surgery on an artery in your head, neck, heart, or legs? No   3. Do you have chest pain with activity? No   4. Do you have a history of  heart failure? No   5. Do you currently have a cold, bronchitis or symptoms of other infection? No   6. Do you have a cough, shortness of breath, or wheezing? No   7. Do you or anyone in your family have previous history of blood clots? UNKNOWN - has not heard about any blood clot issues.    8. Do you or does anyone in your family have a serious bleeding problem such as prolonged bleeding following surgeries or cuts? UNKNOWN - no history that she is aware of.    9. Have you ever had problems with anemia or been told to take iron pills? No   10. Have you had any abnormal blood loss such as black, tarry or bloody stools, or abnormal vaginal bleeding? No   11. Have you ever had a blood transfusion? No   12. Are you willing to have a blood transfusion if it is medically needed  before, during, or after your surgery? Yes   13. Have you or any of your relatives ever had problems with anesthesia? YES - vomiting   14. Do you have sleep apnea, excessive snoring or daytime drowsiness? YES - sleeping at times during the day. Doesn't sleep well.    14a. Do you have a CPAP machine? No   15. Do you have any artifical heart valves or other implanted medical devices like a pacemaker, defibrillator, or continuous glucose monitor? No   16. Do you have artificial joints? No   17. Are you allergic to latex? No   18. Is there any chance that you may be pregnant? -       Health Care Directive:  Patient does not have a Health Care Directive or Living Will: Discussed advance care planning with patient; however, patient declined at this time.  Patient reports she has the information needed.     Preoperative Review of :   reviewed - controlled substances reflected in medication list.      Status of Chronic Conditions:  DEPRESSION - Patient has a long history of Depression of moderate severity requiring medication for control with recent symptoms being stable..Current symptoms of depression include excessive sleepiness, fatigue.     HYPERLIPIDEMIA - Patient has a long history of significant Hyperlipidemia requiring medication for treatment with recent good control. Patient reports no problems or side effects with the medication.     HYPERTENSION - Patient has longstanding history of HTN , currently denies any symptoms referable to elevated blood pressure. Specifically denies chest pain, palpitations, dyspnea, orthopnea, PND or peripheral edema. Blood pressure readings have been in normal range. Current medication regimen is as listed below. Patient denies any side effects of medication.     MIGRAINE - no concerns or changes at this time.     THYROID NODULE- due for recheck at this time.       Review of Systems  CONSTITUTIONAL: NEGATIVE for fever, chills, change in weight  INTEGUMENTARY/SKIN: NEGATIVE for  worrisome rashes, moles or lesions  EYES: NEGATIVE for vision changes or irritation  ENT/MOUTH: NEGATIVE for ear, mouth and throat problems  RESP: NEGATIVE for significant cough or SOB  CV: NEGATIVE for chest pain, palpitations or peripheral edema  GI: NEGATIVE for nausea, abdominal pain, heartburn, or change in bowel habits  : NEGATIVE for frequency, dysuria, or hematuria  MUSCULOSKELETAL:as above  NEURO: NEGATIVE for weakness, dizziness or paresthesias  ENDOCRINE: NEGATIVE for temperature intolerance, skin/hair changes  HEME: NEGATIVE for bleeding problems  PSYCHIATRIC: NEGATIVE for changes in mood or affect    Patient Active Problem List    Diagnosis Date Noted     Osteopenia 05/19/2017     Priority: Medium     Early menopause 05/19/2017     Priority: Medium     Thyroid nodule 12/01/2016     Priority: Medium     Moderate recurrent major depression (H) 12/19/2014     Priority: Medium     Advanced directives, counseling/discussion 01/11/2013     Priority: Medium     Discussed advance care planning with patient; however, patient declined at this time.          Health Care Home 06/13/2012     Priority: Medium     Samira German RN-PHN  FPA / Z[facility]   [phone]    DX V65.8 REPLACED WITH [id number] (04/08/2013)       GERD (gastroesophageal reflux disease) 07/06/2011     Priority: Medium     Knee pain 06/09/2011     Priority: Medium     Back pain 06/09/2011     Priority: Medium     Neck pain 06/09/2011     Priority: Medium     Arthritis of hand, degenerative 06/09/2011     Priority: Medium     Hypertension goal BP (blood pressure) < 140/90 12/13/2010     Priority: Medium     Migraine headache 12/13/2010     Priority: Medium     Dr. Megan Helms at [facility].  (Problem list name updated by automated process. Provider to review and confirm.)       Hyperlipidemia LDL goal <130 12/13/2010     Priority: Medium     Osteoarthritis of both knees, unspecified osteoarthritis type  02/26/2008     Priority: Medium     Arthropathy 02/26/2008     Priority: Medium     Problem list name updated by automated process. Provider to review        Past Medical History:   Diagnosis Date     Cervicalgia      Depressive disorder      Generalized osteoarthrosis, unspecified site      Migraine, unspecified, without mention of intractable migraine without mention of status migrainosus      Thyroid nodule 12/01/2016     Unspecified essential hypertension      Past Surgical History:   Procedure Laterality Date     COLONOSCOPY  2010?     EYE SURGERY  2017-18     HYSTERECTOMY, TANG       ORTHOPEDIC SURGERY       SOFT TISSUE SURGERY       Current Outpatient Medications   Medication Sig Dispense Refill     acyclovir (ZOVIRAX) 400 MG tablet Take 1 tablet (400 mg) by mouth 2 times daily 60 tablet 3     atorvastatin (LIPITOR) 40 MG tablet TAKE ONE TABLET BY MOUTH TWICE WEEKLY 24 tablet 3     CALCIUM-VITAMIN D PO Take by mouth daily       chlorthalidone (HYGROTON) 25 MG tablet TAKE HALF TABLET BY MOUTH DAILY 45 tablet 3     gabapentin (NEURONTIN) 100 MG capsule Take 1 capsule (100 mg) by mouth 3 times daily TAKE ONE CAPSULE BY MOUTH THREE TIMES DAILY 270 capsule 3     MAGNESIUM PO Take by mouth daily       omeprazole (PRILOSEC) 20 MG DR capsule Take 1 capsule (20 mg) by mouth daily 90 capsule 3     SUMAtriptan (IMITREX) 50 MG tablet Take one tablet at onset of headache. May repeat in 2 hours as needed. Max 2/day 18 tablet 3     tiZANidine (ZANAFLEX) 2 MG tablet Take 1 tablet (2 mg) by mouth 2 times daily as needed for muscle spasms 90 tablet 0     traMADol (ULTRAM) 50 MG tablet Take 1 tablet (50 mg) by mouth every 6 hours as needed for severe pain 10 tablet 0       Allergies   Allergen Reactions     Tylenol W/Codeine [Acetaminophen-Codeine] Itching     codeine     Percocet [Oxycodone-Acetaminophen] Rash     Sulfa Drugs Rash        Social History     Tobacco Use     Smoking status: Never Smoker     Smokeless tobacco:  "Never Used   Substance Use Topics     Alcohol use: Not Currently     Comment: 5 drinks per week / wine      Family History   Problem Relation Age of Onset     Arthritis Mother      Lipids Mother      Hypertension Mother      Breast Cancer Mother      Anesthesia Reaction Mother      Heart Disease Father      Cerebrovascular Disease Father      Arthritis Maternal Grandmother      Breast Cancer Maternal Aunt      Breast Cancer Maternal Aunt      Diabetes No family hx of      Coronary Artery Disease No family hx of      Hyperlipidemia No family hx of      Prostate Cancer No family hx of      Other Cancer No family hx of      Anxiety Disorder No family hx of      Mental Illness No family hx of      Substance Abuse No family hx of      History   Drug Use No         Objective     /82   Pulse 78   Temp 98.5  F (36.9  C) (Temporal)   Resp 16   Ht 1.575 m (5' 2\")   Wt 63.8 kg (140 lb 11.2 oz)   SpO2 98%   BMI 25.73 kg/m      Physical Exam    GENERAL APPEARANCE: healthy, alert and no distress     EYES: EOMI, PERRL     HENT: ear canals and TM's normal and nose and mouth without ulcers or lesions     NECK: no adenopathy, no asymmetry, masses, or scars and thyroid normal to palpation     RESP: lungs clear to auscultation - no rales, rhonchi or wheezes     CV: regular rates and rhythm, normal S1 S2, no S3 or S4 and no murmur, click or rub     ABDOMEN:  soft, nontender, no HSM or masses and bowel sounds normal     MS: no edema noted     SKIN: no suspicious lesions or rashes     NEURO: Normal strength and tone, sensory exam grossly normal, mentation intact and speech normal     PSYCH: mentation appears normal. and affect normal/bright     LYMPHATICS: No cervical adenopathy    Recent Labs   Lab Test 04/20/22  1446 11/25/20  1000   HGB  --  12.2   PLT  --  262    139   POTASSIUM 4.6 3.6   CR 0.75 0.65        Diagnostics:  CBC RESULTS: Recent Labs   Lab Test 09/27/22  1140   WBC 6.8   RBC 4.14   HGB 12.1   HCT 37.7 "   MCV 91   MCH 29.2   MCHC 32.1   RDW 13.8        Last Comprehensive Metabolic Panel:  Sodium   Date Value Ref Range Status   09/27/2022 139 133 - 144 mmol/L Final   11/25/2020 139 133 - 144 mmol/L Final     Potassium   Date Value Ref Range Status   09/27/2022 4.1 3.4 - 5.3 mmol/L Final   11/25/2020 3.6 3.4 - 5.3 mmol/L Final     Chloride   Date Value Ref Range Status   09/27/2022 105 94 - 109 mmol/L Final   11/25/2020 103 94 - 109 mmol/L Final     Carbon Dioxide   Date Value Ref Range Status   11/25/2020 29 20 - 32 mmol/L Final     Carbon Dioxide (CO2)   Date Value Ref Range Status   09/27/2022 30 20 - 32 mmol/L Final     Anion Gap   Date Value Ref Range Status   09/27/2022 4 3 - 14 mmol/L Final   11/25/2020 7 3 - 14 mmol/L Final     Glucose   Date Value Ref Range Status   09/27/2022 91 70 - 99 mg/dL Final   11/25/2020 90 70 - 99 mg/dL Final     Urea Nitrogen   Date Value Ref Range Status   09/27/2022 17 7 - 30 mg/dL Final   11/25/2020 17 7 - 30 mg/dL Final     Creatinine   Date Value Ref Range Status   09/27/2022 0.65 0.52 - 1.04 mg/dL Final   11/25/2020 0.65 0.52 - 1.04 mg/dL Final     GFR Estimate   Date Value Ref Range Status   09/27/2022 >90 >60 mL/min/1.73m2 Final     Comment:     Effective December 21, 2021 eGFRcr in adults is calculated using the 2021 CKD-EPI creatinine equation which includes age and gender (José Miguel et al., NEJM, DOI: 10.1056/ZPKYqj4291070)   11/25/2020 88 >60 mL/min/[1.73_m2] Final     Comment:     Non  GFR Calc  Starting 12/18/2018, serum creatinine based estimated GFR (eGFR) will be   calculated using the Chronic Kidney Disease Epidemiology Collaboration   (CKD-EPI) equation.       Calcium   Date Value Ref Range Status   09/27/2022 9.4 8.5 - 10.1 mg/dL Final   11/25/2020 8.8 8.5 - 10.1 mg/dL Final        EKG: appears normal, NSR, normal axis, normal intervals, no acute ST/T changes c/w ischemia, no LVH by voltage criteria, unchanged from previous tracings    Revised  Cardiac Risk Index (RCRI):  The patient has the following serious cardiovascular risks for perioperative complications:   - No serious cardiac risks = 0 points     RCRI Interpretation: 0 points: Class I (very low risk - 0.4% complication rate)           Signed Electronically by: Lizett Parikh MD  Copy of this evaluation report is provided to requesting physician.      Answers for HPI/ROS submitted by the patient on 9/27/2022  If you checked off any problems, how difficult have these problems made it for you to do your work, take care of things at home, or get along with other people?: Not difficult at all  PHQ9 TOTAL SCORE: 2  ASHWIN 7 TOTAL SCORE: 3

## 2022-09-27 NOTE — PATIENT INSTRUCTIONS
Preparing for Your Surgery  Getting started  A nurse will call you to review your health history and instructions. They will give you an arrival time based on your scheduled surgery time. Please be ready to share:    Your doctor's clinic name and phone number    Your medical, surgical and anesthesia history    A list of allergies and sensitivities    A list of medicines, including herbal treatments and over-the-counter drugs    Whether the patient has a legal guardian (ask how to send us the papers in advance)  Please tell us if you're pregnant--or if there's any chance you might be pregnant. Some surgeries may injure a fetus (unborn baby), so they require a pregnancy test. Surgeries that are safe for a fetus don't always need a test, and you can choose whether to have one.   If you have a child who's having surgery, please ask for a copy of Preparing for Your Child's Surgery.    Preparing for surgery    Within 10 to 30 days of surgery: Have a pre-op exam (sometimes called an H&P, or History and Physical). This can be done at a clinic or pre-operative center.  ? If you're having a , you may not need this exam. Talk to your care team.    At your pre-op exam, talk to your care team about all medicines you take. If you need to stop any medicines before surgery, ask when to start taking them again.  ? We do this for your safety. Many medicines can make you bleed too much during surgery. Some change how well surgery (anesthesia) drugs work.    Call your insurance company to let them know you're having surgery. (If you don't have insurance, call [phone].)    Call your clinic if there's any change in your health. This includes signs of a cold or flu (sore throat, runny nose, cough, rash, fever). It also includes a scrape or scratch near the surgery site.    If you have questions on the day of surgery, call your hospital or surgery center.  COVID testing  You may need to be tested for COVID-19 before having  surgery. If so, we will give you instructions (or click here).  Eating and drinking guidelines  For your safety: Unless your surgeon tells you otherwise, follow the guidelines below.    Eat and drink as usual until 8 hours before surgery. After that, no food or milk.    Drink clear liquids until 2 hours before surgery. These are liquids you can see through, like water, Gatorade and Propel Water. You may also have black coffee and tea (no cream or milk).    Nothing by mouth within 2 hours of surgery. This includes gum, candy and breath mints.    If you drink alcohol: Stop drinking it the night before surgery.    If your care team tells you to take medicine on the morning of surgery, it's okay to take it with a sip of water.  Preventing infection    Shower or bathe the night before and morning of your surgery. Follow the instructions your clinic gave you. (If no instructions, use regular soap.)    Don't shave or clip hair near your surgery site. We'll remove the hair if needed.    Don't smoke or vape the morning of surgery. You may chew nicotine gum up to 2 hours before surgery. A nicotine patch is okay.  ? Note: Some surgeries require you to completely quit smoking and nicotine. Check with your surgeon.    Your care team will make every effort to keep you safe from infection. We will:  ? Clean our hands often with soap and water (or an alcohol-based hand rub).  ? Clean the skin at your surgery site with a special soap that kills germs.  ? Give you a special gown to keep you warm. (Cold raises the risk of infection.)  ? Wear special hair covers, masks, gowns and gloves during surgery.  ? Give antibiotic medicine, if prescribed. Not all surgeries need antibiotics.  What to bring on the day of surgery    Photo ID and insurance card    Copy of your health care directive, if you have one    Glasses and hearing aides (bring cases)  ? You can't wear contacts during surgery    Inhaler and eye drops, if you use them (tell us  about these when you arrive)    CPAP machine or breathing device, if you use them    A few personal items, if spending the night    If you have . . .  ? A pacemaker, ICD (cardiac defibrillator) or other implant: Bring the ID card.  ? An implanted stimulator: Bring the remote control.  ? A legal guardian: Bring a copy of the certified (court-stamped) guardianship papers.  Please remove any jewelry, including body piercings. Leave jewelry and other valuables at home.  If you're going home the day of surgery    You must have a responsible adult drive you home. They should stay with you overnight as well.    If you don't have someone to stay with you, and you aren't safe to go home alone, we may keep you overnight. Insurance often won't pay for this.  After surgery  If it's hard to control your pain or you need more pain medicine, please call your surgeon's office.  Questions?   If you have any questions for your care team, list them here: _________________________________________________________________________________________________________________________________________________________________________ ____________________________________ ____________________________________ ____________________________________  For informational purposes only. Not to replace the advice of your health care provider. Copyright   2003, 2019 [facility]. All rights reserved. Clinically reviewed by Neha Chaidez MD. [employer] [id number] - REV 07/22.

## 2022-10-03 ENCOUNTER — LAB (OUTPATIENT)
Dept: LAB | Facility: CLINIC | Age: 77
End: 2022-10-03
Payer: COMMERCIAL

## 2022-10-03 DIAGNOSIS — Z20.822 ENCOUNTER FOR LABORATORY TESTING FOR COVID-19 VIRUS: ICD-10-CM

## 2022-10-03 LAB — SARS-COV-2 RNA RESP QL NAA+PROBE: NEGATIVE

## 2022-10-03 PROCEDURE — U0003 INFECTIOUS AGENT DETECTION BY NUCLEIC ACID (DNA OR RNA); SEVERE ACUTE RESPIRATORY SYNDROME CORONAVIRUS 2 (SARS-COV-2) (CORONAVIRUS DISEASE [COVID-19]), AMPLIFIED PROBE TECHNIQUE, MAKING USE OF HIGH THROUGHPUT TECHNOLOGIES AS DESCRIBED BY CMS-2020-01-R: HCPCS

## 2022-10-03 PROCEDURE — U0005 INFEC AGEN DETEC AMPLI PROBE: HCPCS

## 2022-10-16 ENCOUNTER — HEALTH MAINTENANCE LETTER (OUTPATIENT)
Age: 77
End: 2022-10-16

## 2022-10-18 ENCOUNTER — TRANSFERRED RECORDS (OUTPATIENT)
Dept: HEALTH INFORMATION MANAGEMENT | Facility: CLINIC | Age: 77
End: 2022-10-18

## 2022-11-21 ENCOUNTER — TRANSFERRED RECORDS (OUTPATIENT)
Dept: FAMILY MEDICINE | Facility: CLINIC | Age: 77
End: 2022-11-21

## 2023-01-10 ENCOUNTER — TRANSFERRED RECORDS (OUTPATIENT)
Dept: HEALTH INFORMATION MANAGEMENT | Facility: CLINIC | Age: 78
End: 2023-01-10

## 2023-01-23 ENCOUNTER — TELEPHONE (OUTPATIENT)
Dept: FAMILY MEDICINE | Facility: CLINIC | Age: 78
End: 2023-01-23
Payer: COMMERCIAL

## 2023-01-23 NOTE — TELEPHONE ENCOUNTER
Nicole has an  Open order for a thyroid ultrasound for recheck of thyroid nodule. Was ordered at the end of September but doesn't appear to be scheduled.     Please notify patient and give number to call to schedule.

## 2023-03-20 ENCOUNTER — TELEPHONE (OUTPATIENT)
Dept: FAMILY MEDICINE | Facility: CLINIC | Age: 78
End: 2023-03-20
Payer: COMMERCIAL

## 2023-03-20 NOTE — TELEPHONE ENCOUNTER
There is an order in for a thyroid ultrasound from our visit last year.  It doesn't appear it was done yet.  It is to follow up on prior thyroid nodule.     Please notify patient to call to schedule. Order has been extended.

## 2023-03-26 ENCOUNTER — HEALTH MAINTENANCE LETTER (OUTPATIENT)
Age: 78
End: 2023-03-26

## 2023-04-19 ENCOUNTER — MYC MEDICAL ADVICE (OUTPATIENT)
Dept: FAMILY MEDICINE | Facility: CLINIC | Age: 78
End: 2023-04-19
Payer: COMMERCIAL

## 2023-04-19 NOTE — LETTER
April 26, 2023      Nicole Slaughter  [address]  [place] [address]              Dear Nicole,    We care about your health and have reviewed your health plan including your medical conditions, medications, and lab results.  Based on this review, it is recommended that you follow up regarding the following health topic(s):  -Depression  -Wellness (Physical) Visit      We recommend you take the following action(s):  -schedule a WELLNESS (Physical) APPOINTMENT.  We will perform the following labs: Lipids (fasting cholesterol - nothing to eat except water and/or meds for 8-10 hours).  -Complete and return the attached PHQ-9 Form.  If your total score is greater than 9, please schedule a followup appointment.  If you answer Yes to question 9, call your clinic between the hours of 8 to 5.  You may also call the Suicide Hotline at 9-894-524-VSMB (5745) any time.     Please call us at the [facility] [phone] (or use [employer]) to address the above recommendations.      Thank you for trusting [facility] and we appreciate the opportunity to serve you.  We look forward to supporting your healthcare needs in the future.     Healthy Regards,     Your Health Care Team at [facility]

## 2023-04-19 NOTE — TELEPHONE ENCOUNTER
Patient Quality Outreach    Patient is due for the following:   Depression  -  PHQ-9 needed  Physical Annual Wellness Visit      Topic Date Due     Zoster (Shingles) Vaccine (2 of 3) 12/19/2012     Diptheria Tetanus Pertussis (DTAP/TDAP/TD) Vaccine (1 - Tdap) 07/12/2013     COVID-19 Vaccine (5 - Booster for Pfizer series) 06/15/2022     Flu Vaccine (1) 09/01/2022       Next Steps:   Schedule a Annual Wellness Visit    Type of outreach:    Sent [employer] message.      Questions for provider review:    None     Faby Ellsworth, [facility]  Chart routed to Care Team.

## 2023-04-21 DIAGNOSIS — I10 HYPERTENSION GOAL BP (BLOOD PRESSURE) < 140/90: ICD-10-CM

## 2023-04-21 RX ORDER — CHLORTHALIDONE 25 MG/1
TABLET ORAL
Qty: 45 TABLET | Refills: 0 | Status: SHIPPED | OUTPATIENT
Start: 2023-04-21 | End: 2023-09-05

## 2023-04-21 NOTE — TELEPHONE ENCOUNTER
Prescription approved per [facility] Refill Protocol.  Maricel Slaughter RN on 4/21/2023 at 2:21 PM

## 2023-05-04 DIAGNOSIS — K21.9 GASTROESOPHAGEAL REFLUX DISEASE, UNSPECIFIED WHETHER ESOPHAGITIS PRESENT: ICD-10-CM

## 2023-05-08 ENCOUNTER — OFFICE VISIT (OUTPATIENT)
Dept: ORTHOPEDICS | Facility: CLINIC | Age: 78
End: 2023-05-08
Payer: COMMERCIAL

## 2023-05-08 DIAGNOSIS — M65.332 TRIGGER MIDDLE FINGER OF LEFT HAND: ICD-10-CM

## 2023-05-08 DIAGNOSIS — M17.12 PRIMARY OSTEOARTHRITIS OF LEFT KNEE: Primary | ICD-10-CM

## 2023-05-08 PROCEDURE — 20610 DRAIN/INJ JOINT/BURSA W/O US: CPT | Mod: LT | Performed by: FAMILY MEDICINE

## 2023-05-08 PROCEDURE — 99214 OFFICE O/P EST MOD 30 MIN: CPT | Mod: 25 | Performed by: FAMILY MEDICINE

## 2023-05-08 RX ORDER — TRIAMCINOLONE ACETONIDE 40 MG/ML
40 INJECTION, SUSPENSION INTRA-ARTICULAR; INTRAMUSCULAR
Status: DISCONTINUED | OUTPATIENT
Start: 2023-05-08 | End: 2023-09-20

## 2023-05-08 RX ADMIN — TRIAMCINOLONE ACETONIDE 40 MG: 40 INJECTION, SUSPENSION INTRA-ARTICULAR; INTRAMUSCULAR at 11:04

## 2023-05-08 ASSESSMENT — PAIN SCALES - GENERAL: PAINLEVEL: SEVERE PAIN (7)

## 2023-05-08 NOTE — LETTER
5/8/2023         RE: Nicole Slaughter  [address] [place] [address]        Dear Colleague,    Thank you for referring your patient, Nicole Slaughter, to the [facility] [place]. Please see a copy of my visit note below.    HISTORY OF PRESENT ILLNESS  Ms. Slaughter is a pleasant 78 year old female following up with left knee osteoarthritis.  Ruby is here for an injection, she last had an injection in January of this past year that unfortunately did not help her very much.  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  Specifically her left middle finger, this is quite bothersome and is getting worse over the past few months.  Now her finger is stuck in a mildly flexed position, it is difficult for her to flex her finger completely, she cannot make a fist.  Her right ring finger has similar symptoms but is not as severe.  She has had an injection in her left middle finger that helped somewhat for some time.     PHYSICAL EXAM  General  - normal appearance, in no obvious distress  Musculoskeletal - left middle finger  - inspection: no atrophy, normal joint alignment, no swelling  - palpation: tender palmar A1 pulley   - ROM: Lacks 10 degrees extension and 10 degrees flexion at MCP  - strength: 5/5  strength   Neuro  - no numbness, no motor deficit, grossly normal coordination, normal muscle tone  Skin  - no ecchymosis, erythema, warmth, or induration, no obvious rash          ASSESSMENT & PLAN  Ms. Slaughter is a 78 year old female following up with left knee osteoarthritis.  She also has triggering fingers in her left middle and right ring fingers.    I to perform her knee injection today.    As for her triggering fingers I am referring her to our surgical partners to discuss possible surgical treatment, especially for her left middle finger given her failure of conservative therapy thus far.    It was a pleasure seeing Ruby.        Cristobal Levi, DO, CAQSM      This note  was constructed using Dragon dictation software, please excuse any minor errors in spelling, grammar, or syntax.        Large Joint Injection/Arthocentesis: L knee joint    Date/Time: 5/8/2023 11:04 AM    Performed by: Cristobal Levi DO  Authorized by: Cristobal Levi DO    Indications:  Pain  Needle Size:  22 G  Guidance: landmark guided    Approach:  Lateral  Location:  Knee      Medications:  40 mg triamcinolone 40 MG/ML  Outcome:  Tolerated well, no immediate complications  Procedure discussed: discussed risks, benefits, and alternatives    Consent Given by:  Patient  Timeout: timeout called immediately prior to procedure    Prep: patient was prepped and draped in usual sterile fashion       PROCEDURE    Knee Injection - Intraarticular  The patient was informed of the risks and the benefits of the procedure and a written consent was signed.  The patient s left knee was prepped with chlorhexidine in sterile fashion.   40 mg of triamcinolone suspension was drawn up into a 5 mL syringe with 4 mL of 1% lidocaine.  Injection was performed using substerile technique.  A 1.5-inch 22-gauge needle was used to enter the lateral aspect of the knee.  Injection performed successfully without difficulty.  There were no complications. The patient tolerated the procedure well. There was negligible bleeding.                 Again, thank you for allowing me to participate in the care of your patient.        Sincerely,        Cristobal Levi DO

## 2023-05-08 NOTE — NURSING NOTE
[facility]   ORTHOPEDICS & SPORTS MEDICINE  [address] [place] [address]  Dept: [phone]  ______________________________________________________________________________    Patient: Nicole Slaughter   : 1945   MRN: [id number]   May 8, 2023    INVASIVE PROCEDURE SAFETY CHECKLIST    Date: 2023   Procedure: Left knee injection  Patient Name: Nicole Slaughter  MRN: [id number]  YOB: 1945    Action: Complete sections as appropriate. Any discrepancy results in a HARD COPY until resolved.     PRE PROCEDURE:  Patient ID verified with 2 identifiers (name and  or MRN): Yes  Procedure and site verified with patient/designee (when able): Yes  Accurate consent documentation in medical record: Yes  H&P (or appropriate assessment) documented in medical record: Yes  H&P must be up to 20 days prior to procedure and updates within 24 hours of procedure as applicable: NA  Relevant diagnostic and radiology test results appropriately labeled and displayed as applicable: NA  Procedure site(s) marked with provider initials: NA    TIMEOUT:  Time-Out performed immediately prior to starting procedure, including verbal and active participation of all team members addressing the following:Yes  * Correct patient identify  * Confirmed that the correct side and site are marked  * An accurate procedure consent form  * Agreement on the procedure to be done  * Correct patient position  * Relevant images and results are properly labeled and appropriately displayed  * The need to administer antibiotics or fluids for irrigation purposes during the procedure as applicable   * Safety precautions based on patient history or medication use    DURING PROCEDURE: Verification of correct person, site, and procedures any time the responsibility for care of the patient is transferred to another member of the care team.       Prior to injection, verified patient identity using patient's name and date of  birth.  Due to injection administration, patient instructed to remain in clinic for 15 minutes  afterwards, and to report any adverse reaction to me immediately.    Joint injection was performed.      Drug Amount Wasted:  None.  Vial/Syringe: Single dose vial  Expiration Date:  12/31/2024      Dany Laboy, EMT  May 8, 2023

## 2023-05-08 NOTE — TELEPHONE ENCOUNTER
DIAGNOSIS: left hand fiddle finger pain    APPOINTMENT DATE: 06/16/2023    NOTES STATUS DETAILS   OFFICE NOTE from referring provider Internal 05/08/2023 Dr Levi [facility]    OFFICE NOTE from other specialist N/A    DISCHARGE SUMMARY from hospital N/A    DISCHARGE REPORT from the ER N/A    OPERATIVE REPORT N/A    MEDICATION LIST N/A    EMG (for Spine) N/A    IMPLANT RECORD/STICKER N/A    LABS     CBC/DIFF N/A    CULTURES N/A    INJECTIONS DONE IN RADIOLOGY N/A    MRI N/A    CT SCAN N/A    XRAYS (IMAGES & REPORTS) N/A    TUMOR     PATHOLOGY  Slides & report N/A

## 2023-05-08 NOTE — NURSING NOTE
Chief Complaint   Patient presents with     Left Knee - Pain, Follow Up       There were no vitals filed for this visit.    There is no height or weight on file to calculate BMI.      FLORIAN Saenz NREMT

## 2023-05-08 NOTE — PROGRESS NOTES
HISTORY OF PRESENT ILLNESS  Ms. Slaughter is a pleasant 78 year old female following up with left knee osteoarthritis.  Ruby is here for an injection, she last had an injection in January of this past year that unfortunately did not help her very much.  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  Specifically her left middle finger, this is quite bothersome and is getting worse over the past few months.  Now her finger is stuck in a mildly flexed position, it is difficult for her to flex her finger completely, she cannot make a fist.  Her right ring finger has similar symptoms but is not as severe.  She has had an injection in her left middle finger that helped somewhat for some time.     PHYSICAL EXAM  General  - normal appearance, in no obvious distress  Musculoskeletal - left middle finger  - inspection: no atrophy, normal joint alignment, no swelling  - palpation: tender palmar A1 pulley   - ROM: Lacks 10 degrees extension and 10 degrees flexion at MCP  - strength: 5/5  strength   Neuro  - no numbness, no motor deficit, grossly normal coordination, normal muscle tone  Skin  - no ecchymosis, erythema, warmth, or induration, no obvious rash          ASSESSMENT & PLAN  Ms. Slaughter is a 78 year old female following up with left knee osteoarthritis.  She also has triggering fingers in her left middle and right ring fingers.    I to perform her knee injection today.    As for her triggering fingers I am referring her to our surgical partners to discuss possible surgical treatment, especially for her left middle finger given her failure of conservative therapy thus far.    It was a pleasure seeing Ruby.        Cristobal Levi DO, CAQSM      This note was constructed using Dragon dictation software, please excuse any minor errors in spelling, grammar, or syntax.        Large Joint Injection/Arthocentesis: L knee joint    Date/Time: 5/8/2023 11:04 AM    Performed by: Cristobal Levi DO  Authorized by: Cristobal Levi  Thom, DO    Indications:  Pain  Needle Size:  22 G  Guidance: landmark guided    Approach:  Lateral  Location:  Knee      Medications:  40 mg triamcinolone 40 MG/ML  Outcome:  Tolerated well, no immediate complications  Procedure discussed: discussed risks, benefits, and alternatives    Consent Given by:  Patient  Timeout: timeout called immediately prior to procedure    Prep: patient was prepped and draped in usual sterile fashion       PROCEDURE    Knee Injection - Intraarticular  The patient was informed of the risks and the benefits of the procedure and a written consent was signed.  The patient s left knee was prepped with chlorhexidine in sterile fashion.   40 mg of triamcinolone suspension was drawn up into a 5 mL syringe with 4 mL of 1% lidocaine.  Injection was performed using substerile technique.  A 1.5-inch 22-gauge needle was used to enter the lateral aspect of the knee.  Injection performed successfully without difficulty.  There were no complications. The patient tolerated the procedure well. There was negligible bleeding.

## 2023-06-16 ENCOUNTER — OFFICE VISIT (OUTPATIENT)
Dept: ORTHOPEDICS | Facility: CLINIC | Age: 78
End: 2023-06-16
Payer: COMMERCIAL

## 2023-06-16 ENCOUNTER — PRE VISIT (OUTPATIENT)
Dept: ORTHOPEDICS | Facility: CLINIC | Age: 78
End: 2023-06-16

## 2023-06-16 DIAGNOSIS — M65.332 TRIGGER FINGER, LEFT MIDDLE FINGER: Primary | ICD-10-CM

## 2023-06-16 PROCEDURE — 20550 NJX 1 TENDON SHEATH/LIGAMENT: CPT | Mod: F2 | Performed by: STUDENT IN AN ORGANIZED HEALTH CARE EDUCATION/TRAINING PROGRAM

## 2023-06-16 PROCEDURE — 99204 OFFICE O/P NEW MOD 45 MIN: CPT | Mod: 25 | Performed by: STUDENT IN AN ORGANIZED HEALTH CARE EDUCATION/TRAINING PROGRAM

## 2023-06-16 RX ORDER — TRIAMCINOLONE ACETONIDE 40 MG/ML
20 INJECTION, SUSPENSION INTRA-ARTICULAR; INTRAMUSCULAR
Status: DISCONTINUED | OUTPATIENT
Start: 2023-06-16 | End: 2023-09-20

## 2023-06-16 RX ADMIN — TRIAMCINOLONE ACETONIDE 20 MG: 40 INJECTION, SUSPENSION INTRA-ARTICULAR; INTRAMUSCULAR at 16:14

## 2023-06-16 ASSESSMENT — PAIN SCALES - GENERAL: PAINLEVEL: MILD PAIN (3)

## 2023-06-16 NOTE — LETTER
6/16/2023         RE: Nicole Slaughter  [address] [place] [address]        Dear Colleague,    Thank you for referring your patient, Nicole Slaughter, to the [facility]. Please see a copy of my visit note below.          Hand / Upper Extremity Injection/Arthrocentesis: L long A1    Date/Time: 6/16/2023 4:14 PM    Performed by: Vito Byers MD  Authorized by: Vito Byers MD    Indications:  Pain  Needle Size:  25 G  Guidance: landmark    Condition: trigger finger    Location:  Long finger    Site:  L long A1  Medications:  20 mg triamcinolone 40 MG/ML  Outcome:  Tolerated well, no immediate complications  Procedure discussed: discussed risks, benefits, and alternatives    Consent Given by:  Patient  Timeout: timeout called immediately prior to procedure    Prep: patient was prepped and draped in usual sterile fashion            Ortho Hand    HPI: 78F RHD NS retiree p/w L MF and R MF with intermittent triggering, difficulty with fully extending the PIP joints and pain in the palm. L MF symptoms have been present for 1 year, but the R MF symptoms started 2 months ago. No recent injuries. The symptoms are intermittent. It makes it difficult to do some of her favorite activities like gardening.    ROS: Negative, see HPI  PMH: HTN, HLD  PSH: CTRs and thumb CMC surgery many years ago  Medications: None  Allergies: Sulfa, Codeine  SH: Nonsmoker, denies tobacco or nicotine use  FH: No bleeding or clotting issues, or problems with anesthesia    Examination:  Nonlabored breathing  Not distressed  L MF and R MF tenderness over the A1 pulleys  L MF with some residual 15 deg PIP flexion deformity but passively extendable  No EDC subluxation affecting any fingers  No Dupuytren's contractures    A/P: 78F RHD NS p/w BL MF triggering    -Discussed the etiology, natural history and treatment options for finger triggering. The treatment options include stretching, massage,  activity modification, steroid injection(s), and surgery. Since patient has the worst and most long-standing symptoms affecting the left middle finger, it would not be unreasonable to try a steroid injection. Patient is agreeable to the plan.   -Offered a left middle finger A1 pulley steroid injection. Patient would like this. Discussed the risks of steroid injections, including but not limited to: infection, bleeding, pain, injury to tendons or nerves, fat atrophy, skin depigmentation. Despite these risks, patient consents to steroid injection. Cleansed the skin with Chlorhexidine and 1:1 mixture of 20 mg Kenalog and 1% lidocaine was injected into each treatment area/joint. Patient tolerated the procedure with no issues and experienced immediate relief.   -Return to clinic as needed  -A total of 45 minutes was devoted to review of chart, direct face-to-face patient counseling and documentation during this encounter, exclusive of any procedure performed.    Vito Byers MD, PhD        Again, thank you for allowing me to participate in the care of your patient.        Sincerely,        Vito Byers MD

## 2023-06-16 NOTE — NURSING NOTE
[facility]   ORTHOPEDICS & SPORTS MEDICINE  [address] [place] [address]  Dept: [phone]  ______________________________________________________________________________    Patient: Nicole Slaughter   : 1945   MRN: [id number]   2023    INVASIVE PROCEDURE SAFETY CHECKLIST    Date: 2023   Procedure: Left middle finger injection  Patient Name: Nicole Slaughter  MRN: [id number]  YOB: 1945    Action: Complete sections as appropriate. Any discrepancy results in a HARD COPY until resolved.     PRE PROCEDURE:  Patient ID verified with 2 identifiers (name and  or MRN): Yes  Procedure and site verified with patient/designee (when able): Yes  Accurate consent documentation in medical record: Yes  H&P (or appropriate assessment) documented in medical record: Yes  H&P must be up to 20 days prior to procedure and updates within 24 hours of procedure as applicable: NA  Relevant diagnostic and radiology test results appropriately labeled and displayed as applicable: NA  Procedure site(s) marked with provider initials: NA    TIMEOUT:  Time-Out performed immediately prior to starting procedure, including verbal and active participation of all team members addressing the following:Yes  * Correct patient identify  * Confirmed that the correct side and site are marked  * An accurate procedure consent form  * Agreement on the procedure to be done  * Correct patient position  * Relevant images and results are properly labeled and appropriately displayed  * The need to administer antibiotics or fluids for irrigation purposes during the procedure as applicable   * Safety precautions based on patient history or medication use    DURING PROCEDURE: Verification of correct person, site, and procedures any time the responsibility for care of the patient is transferred to another member of the care team.       Prior to injection, verified patient identity using patient's name and date  of birth.  Due to injection administration, patient instructed to remain in clinic for 15 minutes  afterwards, and to report any adverse reaction to me immediately.    Left middle finger trigger finger injection    Drug Amount Wasted:  Yes: 20 mg/ml   Vial/Syringe: Single dose vial  Expiration Date:  1/31/2025      Dany Laboy, EMT  June 16, 2023

## 2023-06-16 NOTE — PROGRESS NOTES
Hand / Upper Extremity Injection/Arthrocentesis: L long A1    Date/Time: 6/16/2023 4:14 PM    Performed by: Vito Byers MD  Authorized by: Vito Byers MD    Indications:  Pain  Needle Size:  25 G  Guidance: landmark    Condition: trigger finger    Location:  Long finger    Site:  L long A1  Medications:  20 mg triamcinolone 40 MG/ML  Outcome:  Tolerated well, no immediate complications  Procedure discussed: discussed risks, benefits, and alternatives    Consent Given by:  Patient  Timeout: timeout called immediately prior to procedure    Prep: patient was prepped and draped in usual sterile fashion

## 2023-06-17 NOTE — PROGRESS NOTES
Ortho Hand    HPI: 78F RHD NS retiree p/w L MF and R MF with intermittent triggering, difficulty with fully extending the PIP joints and pain in the palm. L MF symptoms have been present for 1 year, but the R MF symptoms started 2 months ago. No recent injuries. The symptoms are intermittent. It makes it difficult to do some of her favorite activities like gardening.    ROS: Negative, see HPI  PMH: HTN, HLD  PSH: CTRs and thumb CMC surgery many years ago  Medications: None  Allergies: Sulfa, Codeine  SH: Nonsmoker, denies tobacco or nicotine use  FH: No bleeding or clotting issues, or problems with anesthesia    Examination:  Nonlabored breathing  Not distressed  L MF and R MF tenderness over the A1 pulleys  L MF with some residual 15 deg PIP flexion deformity but passively extendable  No EDC subluxation affecting any fingers  No Dupuytren's contractures    A/P: 78F RHD NS p/w BL MF triggering    -Discussed the etiology, natural history and treatment options for finger triggering. The treatment options include stretching, massage, activity modification, steroid injection(s), and surgery. Since patient has the worst and most long-standing symptoms affecting the left middle finger, it would not be unreasonable to try a steroid injection. Patient is agreeable to the plan.   -Offered a left middle finger A1 pulley steroid injection. Patient would like this. Discussed the risks of steroid injections, including but not limited to: infection, bleeding, pain, injury to tendons or nerves, fat atrophy, skin depigmentation. Despite these risks, patient consents to steroid injection. Cleansed the skin with Chlorhexidine and 1:1 mixture of 20 mg Kenalog and 1% lidocaine was injected into each treatment area/joint. Patient tolerated the procedure with no issues and experienced immediate relief.   -Return to clinic as needed  -A total of 45 minutes was devoted to review of chart, direct face-to-face patient counseling and  documentation during this encounter, exclusive of any procedure performed.    Vito Byers MD, PhD

## 2023-07-24 DIAGNOSIS — G43.009 MIGRAINE WITHOUT AURA AND WITHOUT STATUS MIGRAINOSUS, NOT INTRACTABLE: ICD-10-CM

## 2023-07-24 RX ORDER — SUMATRIPTAN 50 MG/1
TABLET, FILM COATED ORAL
Qty: 18 TABLET | Refills: 0 | Status: SHIPPED | OUTPATIENT
Start: 2023-07-24 | End: 2023-09-20

## 2023-07-24 NOTE — LETTER
July 26, 2023      Nicole Slaughter  [address]  [place] [address]                Sixto Rivera,     We just wanted to let you know that we sent in a refill for your sumatriptan but you are due for a annual phyiscal visit before your next refill is due.      Please give us a call at [phone] or use [employer] to schedule.      Have a great day.     Your [facility] Team

## 2023-07-24 NOTE — TELEPHONE ENCOUNTER
Pending Prescriptions:                       Disp   Refills    SUMAtriptan (IMITREX) 50 MG tablet [Pharm*18 tab*0            Sig: Take one tablet at onset of headache. May repeat           in 2 hours as needed. Max 2/day    Medication is being filled for 1 time mariangel refill only due to:  Patient is due for annual    Please call and help schedule.  Thank you!

## 2023-07-31 DIAGNOSIS — K21.9 GASTROESOPHAGEAL REFLUX DISEASE, UNSPECIFIED WHETHER ESOPHAGITIS PRESENT: ICD-10-CM

## 2023-07-31 ASSESSMENT — PATIENT HEALTH QUESTIONNAIRE - PHQ9
10. IF YOU CHECKED OFF ANY PROBLEMS, HOW DIFFICULT HAVE THESE PROBLEMS MADE IT FOR YOU TO DO YOUR WORK, TAKE CARE OF THINGS AT HOME, OR GET ALONG WITH OTHER PEOPLE: NOT DIFFICULT AT ALL
SUM OF ALL RESPONSES TO PHQ QUESTIONS 1-9: 2
SUM OF ALL RESPONSES TO PHQ QUESTIONS 1-9: 2

## 2023-07-31 NOTE — LETTER
July 31, 2023      Nicole Slaughter  [address]  [place] [address]            Sixto Rivera,     We just wanted to let you know that we sent in a refill for your sumatriptan and omeprazole but you are due for a annual phyiscal visit before your next refill is due.      Please give us a call at [phone] or use [employer] to schedule.      Have a great day.     Your [facility] Team

## 2023-07-31 NOTE — TELEPHONE ENCOUNTER
Pending Prescriptions:                       Disp   Refills    omeprazole (PRILOSEC) 20 MG DR capsule [P*90 cap*0            Sig: TAKE ONE CAPSULE BY MOUTH ONE TIME DAILY    Medication is being filled for 1 time mariangel refill only due to:  Patient is due for physical.    Please call and help schedule.  Thank you!

## 2023-08-08 ENCOUNTER — TRANSFERRED RECORDS (OUTPATIENT)
Dept: HEALTH INFORMATION MANAGEMENT | Facility: CLINIC | Age: 78
End: 2023-08-08
Payer: COMMERCIAL

## 2023-08-21 ENCOUNTER — MYC MEDICAL ADVICE (OUTPATIENT)
Dept: FAMILY MEDICINE | Facility: CLINIC | Age: 78
End: 2023-08-21
Payer: COMMERCIAL

## 2023-08-21 DIAGNOSIS — G43.009 MIGRAINE WITHOUT AURA AND WITHOUT STATUS MIGRAINOSUS, NOT INTRACTABLE: ICD-10-CM

## 2023-08-21 RX ORDER — GABAPENTIN 100 MG/1
CAPSULE ORAL
Qty: 270 CAPSULE | Refills: 0 | Status: SHIPPED | OUTPATIENT
Start: 2023-08-21 | End: 2024-02-06

## 2023-09-05 DIAGNOSIS — I10 HYPERTENSION GOAL BP (BLOOD PRESSURE) < 140/90: ICD-10-CM

## 2023-09-05 RX ORDER — CHLORTHALIDONE 25 MG/1
TABLET ORAL
Qty: 45 TABLET | Refills: 0 | Status: SHIPPED | OUTPATIENT
Start: 2023-09-05 | End: 2023-09-20

## 2023-09-20 ENCOUNTER — VIRTUAL VISIT (OUTPATIENT)
Dept: FAMILY MEDICINE | Facility: CLINIC | Age: 78
End: 2023-09-20
Payer: COMMERCIAL

## 2023-09-20 DIAGNOSIS — I10 HYPERTENSION GOAL BP (BLOOD PRESSURE) < 140/90: ICD-10-CM

## 2023-09-20 DIAGNOSIS — Z79.899 ENCOUNTER FOR LONG-TERM (CURRENT) USE OF MEDICATIONS: ICD-10-CM

## 2023-09-20 DIAGNOSIS — E78.5 HYPERLIPIDEMIA LDL GOAL <130: ICD-10-CM

## 2023-09-20 DIAGNOSIS — F32.5 MAJOR DEPRESSIVE DISORDER, SINGLE EPISODE, IN FULL REMISSION (H): ICD-10-CM

## 2023-09-20 DIAGNOSIS — G43.009 MIGRAINE WITHOUT AURA AND WITHOUT STATUS MIGRAINOSUS, NOT INTRACTABLE: Primary | ICD-10-CM

## 2023-09-20 DIAGNOSIS — M25.561 RIGHT KNEE PAIN, UNSPECIFIED CHRONICITY: ICD-10-CM

## 2023-09-20 DIAGNOSIS — R25.2 LEG CRAMPS: ICD-10-CM

## 2023-09-20 PROCEDURE — 99214 OFFICE O/P EST MOD 30 MIN: CPT | Mod: 95 | Performed by: FAMILY MEDICINE

## 2023-09-20 RX ORDER — SUMATRIPTAN 50 MG/1
TABLET, FILM COATED ORAL
Qty: 18 TABLET | Refills: 3 | Status: SHIPPED | OUTPATIENT
Start: 2023-09-20

## 2023-09-20 RX ORDER — ATORVASTATIN CALCIUM 40 MG/1
TABLET, FILM COATED ORAL
Qty: 24 TABLET | Refills: 1 | Status: CANCELLED | OUTPATIENT
Start: 2023-09-20

## 2023-09-20 RX ORDER — CHLORTHALIDONE 25 MG/1
12.5 TABLET ORAL DAILY
Qty: 45 TABLET | Refills: 1 | Status: SHIPPED | OUTPATIENT
Start: 2023-09-20 | End: 2023-11-29

## 2023-09-20 RX ORDER — TIZANIDINE 2 MG/1
2 TABLET ORAL 2 TIMES DAILY PRN
Qty: 90 TABLET | Refills: 1 | Status: SHIPPED | OUTPATIENT
Start: 2023-09-20

## 2023-09-20 ASSESSMENT — ANXIETY QUESTIONNAIRES
7. FEELING AFRAID AS IF SOMETHING AWFUL MIGHT HAPPEN: NOT AT ALL
GAD7 TOTAL SCORE: 1
IF YOU CHECKED OFF ANY PROBLEMS ON THIS QUESTIONNAIRE, HOW DIFFICULT HAVE THESE PROBLEMS MADE IT FOR YOU TO DO YOUR WORK, TAKE CARE OF THINGS AT HOME, OR GET ALONG WITH OTHER PEOPLE: NOT DIFFICULT AT ALL
5. BEING SO RESTLESS THAT IT IS HARD TO SIT STILL: NOT AT ALL
6. BECOMING EASILY ANNOYED OR IRRITABLE: SEVERAL DAYS
GAD7 TOTAL SCORE: 1
1. FEELING NERVOUS, ANXIOUS, OR ON EDGE: NOT AT ALL
3. WORRYING TOO MUCH ABOUT DIFFERENT THINGS: NOT AT ALL
4. TROUBLE RELAXING: NOT AT ALL
2. NOT BEING ABLE TO STOP OR CONTROL WORRYING: NOT AT ALL

## 2023-09-20 ASSESSMENT — PATIENT HEALTH QUESTIONNAIRE - PHQ9: SUM OF ALL RESPONSES TO PHQ QUESTIONS 1-9: 6

## 2023-09-20 NOTE — PROGRESS NOTES
Nicole is a 78 year old who is being evaluated via a billable telephone visit.      What phone number would you like to be contacted at? [phone]  How would you like to obtain your AVS? Basilio    Distant Location (provider location):  On-site    Assessment & Plan     Migraine without aura and without status migrainosus, not intractable  Patient had questions today about potentially trying different medication.  She is just wondering if she could go down to 1 medication instead of 2 by taking the Nurtec.  Overall she is controlled with the gabapentin and the Imitrex.  She noticed a little bit of uptick in headaches in the fall due to allergies but otherwise feels that it is okay.  We discussed potential for Nurtec but since she is well controlled would consider not making a change at this point.  Patient agreed.  - SUMAtriptan (IMITREX) 50 MG tablet; Take one tablet at onset of headache. May repeat in 2 hours as needed. Max 2/day    Right knee pain, unspecified chronicity  Patient uses Zanaflex sometimes to help with her knee pain as well as helping to control cramps in her legs at night.  This seems to be working well.  Refills given.  - tiZANidine (ZANAFLEX) 2 MG tablet; Take 1 tablet (2 mg) by mouth 2 times daily as needed for muscle spasms    Hypertension goal BP (blood pressure) < 140/90  Recommend she comes in for a blood pressure recheck on the float schedule.  Continue with chlorthalidone.  Awaiting lab results.  - chlorthalidone (HYGROTON) 25 MG tablet; Take 0.5 tablets (12.5 mg) by mouth daily    Major depressive disorder, single episode, in full remission (H)  Patient reports she is well controlled at this point.  She states that she does not wish to make any changes at this point.    Leg cramps  Patient has leg cramps most evenings.  She has tried increasing fluids.  She is taking potassium and magnesium over-the-counter.  Which seems to help.  She uses Zanaflex at times.  We will check a CMP and  "notify results.  - Comprehensive metabolic panel (BMP + Alb, Alk Phos, ALT, AST, Total. Bili, TP); Future    Hyperlipidemia LDL goal <130  Patient is due for recheck of lipid panel.  She will return for lipids.  Will notify results.  - Lipid panel reflex to direct LDL Fasting; Future    Encounter for long-term (current) use of medications  Will notify of results.  - Comprehensive metabolic panel (BMP + Alb, Alk Phos, ALT, AST, Total. Bili, TP); Future  - CBC with platelets and differential; Future  - Ferritin; Future  - Magnesium; Future             BMI:   Estimated body mass index is 25.73 kg/m  as calculated from the following:    Height as of 9/27/22: 1.575 m (5' 2\").    Weight as of 9/27/22: 63.8 kg (140 lb 11.2 oz).           Lizett Parikh MD  [facility] BISHNU Rivera is a 78 year old, presenting for the following health issues:  Lipids (/), Headache (/), and Hypertension        9/20/2023     1:32 PM   Additional Questions   Roomed by ALMAZ   Accompanied by self       Sumatriptan    History of Present Illness       Reason for visit:  To renew meds    She eats 2-3 servings of fruits and vegetables daily.She consumes 0 sweetened beverage(s) daily.She exercises with enough effort to increase her heart rate 60 or more minutes per day.  She exercises with enough effort to increase her heart rate 7 days per week.   She is taking medications regularly.       Hyperlipidemia Follow-Up    Are you regularly taking any medication or supplement to lower your cholesterol?   Yes- atorvastatin  Are you having muscle aches or other side effects that you think could be caused by your cholesterol lowering medication?  No    Hypertension Follow-up    Do you check your blood pressure regularly outside of the clinic? No   Are you following a low salt diet? Yes  Are your blood pressures ever more than 140 on the top number (systolic) OR more   than 90 on the bottom number (diastolic), for example " 140/90? No    Migraine   Since your last clinic visit, how have your headaches changed?  No change  How often are you getting headaches or migraines? Bad right now due to season change   Are you able to do normal daily activities when you have a migraine? Yes  Are you taking rescue/relief medications? (Select all that apply) sumatriptan (Imitrex)  How helpful is your rescue/relief medication?  I get total relief  Are you taking any medications to prevent migraines? (Select all that apply)  No  In the past 4 weeks, how often have you gone to urgent care or the emergency room because of your headaches?  0  Wondering about Nurtec.  Takes sumatriptan for headaches. Takes gabapentin for prevention.   Has a worse issue in the fall.  Not sleeping well. Taking CBD gummies to help her sleep. Still waking up. Taking tizanidine at night at times. Hard time staying asleep.     Depression Followup  How are you doing with your depression since your last visit? No change  Are you having other symptoms that might be associated with depression? No  Have you had a significant life event?  OTHER:  has alzheimers    Are you feeling anxious or having panic attacks?   No  Do you have any concerns with your use of alcohol or other drugs? No    Social History     Tobacco Use    Smoking status: Never    Smokeless tobacco: Never   Vaping Use    Vaping Use: Never used   Substance Use Topics    Alcohol use: Not Currently     Comment: 5 drinks per week / wine     Drug use: No         9/27/2022    10:45 AM 7/31/2023    10:37 AM 9/20/2023     1:35 PM   PHQ   PHQ-9 Total Score 2 2 6   Q9: Thoughts of better off dead/self-harm past 2 weeks Not at all Not at all Not at all         6/28/2022     7:36 PM 9/27/2022    10:45 AM 9/20/2023     1:37 PM   ASHWIN-7 SCORE   Total Score 3 (minimal anxiety) 3 (minimal anxiety)    Total Score 3 3 1         Suicide Assessment Five-step Evaluation and Treatment (SAFE-T)        Review of Systems    CONSTITUTIONAL: NEGATIVE for fever, chills, change in weight  ENT/MOUTH: NEGATIVE for ear, mouth and throat problems  RESP: NEGATIVE for significant cough or SOB  CV: NEGATIVE for chest pain, palpitations or peripheral edema      Objective           Vitals:  No vitals were obtained today due to virtual visit.    Physical Exam   healthy, alert, and no distress  PSYCH: Alert and oriented times 3; coherent speech, normal   rate and volume, able to articulate logical thoughts, able   to abstract reason, no tangential thoughts, no hallucinations   or delusions  Her affect is normal  RESP: No cough, no audible wheezing, able to talk in full sentences  Remainder of exam unable to be completed due to telephone visits                Phone call duration: 14 minutes

## 2023-09-22 DIAGNOSIS — E78.5 HYPERLIPIDEMIA LDL GOAL <130: ICD-10-CM

## 2023-09-22 RX ORDER — ATORVASTATIN CALCIUM 40 MG/1
TABLET, FILM COATED ORAL
Qty: 24 TABLET | Refills: 0 | Status: SHIPPED | OUTPATIENT
Start: 2023-09-22 | End: 2023-12-11

## 2023-09-25 ENCOUNTER — LAB (OUTPATIENT)
Dept: LAB | Facility: CLINIC | Age: 78
End: 2023-09-25
Payer: COMMERCIAL

## 2023-09-25 DIAGNOSIS — Z79.899 ENCOUNTER FOR LONG-TERM (CURRENT) USE OF MEDICATIONS: ICD-10-CM

## 2023-09-25 DIAGNOSIS — R25.2 LEG CRAMPS: ICD-10-CM

## 2023-09-25 DIAGNOSIS — E78.5 HYPERLIPIDEMIA LDL GOAL <130: ICD-10-CM

## 2023-09-25 LAB
ALBUMIN SERPL BCG-MCNC: 4.3 G/DL (ref 3.5–5.2)
ALP SERPL-CCNC: 86 U/L (ref 35–104)
ALT SERPL W P-5'-P-CCNC: 14 U/L (ref 0–50)
ANION GAP SERPL CALCULATED.3IONS-SCNC: 9 MMOL/L (ref 7–15)
AST SERPL W P-5'-P-CCNC: 25 U/L (ref 0–45)
BASOPHILS # BLD AUTO: 0 10E3/UL (ref 0–0.2)
BASOPHILS NFR BLD AUTO: 1 %
BILIRUB SERPL-MCNC: 0.7 MG/DL
BUN SERPL-MCNC: 15.2 MG/DL (ref 8–23)
CALCIUM SERPL-MCNC: 9.7 MG/DL (ref 8.8–10.2)
CHLORIDE SERPL-SCNC: 102 MMOL/L (ref 98–107)
CHOLEST SERPL-MCNC: 209 MG/DL
CREAT SERPL-MCNC: 0.86 MG/DL (ref 0.51–0.95)
DEPRECATED HCO3 PLAS-SCNC: 28 MMOL/L (ref 22–29)
EGFRCR SERPLBLD CKD-EPI 2021: 70 ML/MIN/1.73M2
EOSINOPHIL # BLD AUTO: 0.1 10E3/UL (ref 0–0.7)
EOSINOPHIL NFR BLD AUTO: 2 %
ERYTHROCYTE [DISTWIDTH] IN BLOOD BY AUTOMATED COUNT: 14.1 % (ref 10–15)
FERRITIN SERPL-MCNC: 17 NG/ML (ref 11–328)
GLUCOSE SERPL-MCNC: 102 MG/DL (ref 70–99)
HCT VFR BLD AUTO: 37.9 % (ref 35–47)
HDLC SERPL-MCNC: 72 MG/DL
HGB BLD-MCNC: 12.2 G/DL (ref 11.7–15.7)
IMM GRANULOCYTES # BLD: 0 10E3/UL
IMM GRANULOCYTES NFR BLD: 0 %
LDLC SERPL CALC-MCNC: 124 MG/DL
LYMPHOCYTES # BLD AUTO: 0.9 10E3/UL (ref 0.8–5.3)
LYMPHOCYTES NFR BLD AUTO: 14 %
MAGNESIUM SERPL-MCNC: 2.1 MG/DL (ref 1.7–2.3)
MCH RBC QN AUTO: 28.2 PG (ref 26.5–33)
MCHC RBC AUTO-ENTMCNC: 32.2 G/DL (ref 31.5–36.5)
MCV RBC AUTO: 88 FL (ref 78–100)
MONOCYTES # BLD AUTO: 0.6 10E3/UL (ref 0–1.3)
MONOCYTES NFR BLD AUTO: 9 %
NEUTROPHILS # BLD AUTO: 4.9 10E3/UL (ref 1.6–8.3)
NEUTROPHILS NFR BLD AUTO: 75 %
NONHDLC SERPL-MCNC: 137 MG/DL
PLATELET # BLD AUTO: 234 10E3/UL (ref 150–450)
POTASSIUM SERPL-SCNC: 4.7 MMOL/L (ref 3.4–5.3)
PROT SERPL-MCNC: 7 G/DL (ref 6.4–8.3)
RBC # BLD AUTO: 4.32 10E6/UL (ref 3.8–5.2)
SODIUM SERPL-SCNC: 139 MMOL/L (ref 136–145)
TRIGL SERPL-MCNC: 64 MG/DL
WBC # BLD AUTO: 6.5 10E3/UL (ref 4–11)

## 2023-09-25 PROCEDURE — 83735 ASSAY OF MAGNESIUM: CPT

## 2023-09-25 PROCEDURE — 85025 COMPLETE CBC W/AUTO DIFF WBC: CPT

## 2023-09-25 PROCEDURE — 82728 ASSAY OF FERRITIN: CPT

## 2023-09-25 PROCEDURE — 36415 COLL VENOUS BLD VENIPUNCTURE: CPT

## 2023-09-25 PROCEDURE — 80053 COMPREHEN METABOLIC PANEL: CPT

## 2023-09-25 PROCEDURE — 80061 LIPID PANEL: CPT

## 2023-09-25 NOTE — RESULT ENCOUNTER NOTE
Please inform of results if patient has not viewed in [facility] within 3 business days.    CBC Results - Your cell counts were normal.    Please call the clinic with any questions you may have.     Have a great day,    Dr. Drummond

## 2023-09-28 ENCOUNTER — ALLIED HEALTH/NURSE VISIT (OUTPATIENT)
Dept: FAMILY MEDICINE | Facility: CLINIC | Age: 78
End: 2023-09-28
Attending: FAMILY MEDICINE
Payer: COMMERCIAL

## 2023-09-28 VITALS — DIASTOLIC BLOOD PRESSURE: 74 MMHG | SYSTOLIC BLOOD PRESSURE: 136 MMHG

## 2023-09-28 DIAGNOSIS — I10 HYPERTENSION GOAL BP (BLOOD PRESSURE) < 140/90: Primary | ICD-10-CM

## 2023-09-28 PROCEDURE — 99207 PR NO CHARGE NURSE ONLY: CPT

## 2023-09-28 NOTE — PROGRESS NOTES
Chief Complaint   Patient presents with    Allied Health Visit     Nicole Slaughter is a 78 year old patient who comes in today for a Blood Pressure check.  Initial BP:  /74      Data Unavailable  Disposition: follow-up as previously indicated by provider    Faby Ellsworth CMA (AAMA)

## 2023-10-05 DIAGNOSIS — R05.9 COUGH, UNSPECIFIED TYPE: Primary | ICD-10-CM

## 2023-10-05 RX ORDER — BENZONATATE 100 MG/1
100 CAPSULE ORAL 3 TIMES DAILY PRN
Qty: 42 CAPSULE | Refills: 0 | Status: SHIPPED | OUTPATIENT
Start: 2023-10-05 | End: 2024-04-12

## 2023-10-11 DIAGNOSIS — B00.9 HERPES SIMPLEX VIRUS INFECTION: ICD-10-CM

## 2023-10-11 RX ORDER — ACYCLOVIR 400 MG/1
400 TABLET ORAL 2 TIMES DAILY
Qty: 60 TABLET | Refills: 0 | Status: SHIPPED | OUTPATIENT
Start: 2023-10-11 | End: 2023-12-07

## 2023-11-29 DIAGNOSIS — I10 HYPERTENSION GOAL BP (BLOOD PRESSURE) < 140/90: ICD-10-CM

## 2023-11-29 RX ORDER — CHLORTHALIDONE 25 MG/1
12.5 TABLET ORAL DAILY
Qty: 45 TABLET | Refills: 0 | Status: SHIPPED | OUTPATIENT
Start: 2023-11-29 | End: 2024-02-20

## 2023-12-07 DIAGNOSIS — B00.9 HERPES SIMPLEX VIRUS INFECTION: ICD-10-CM

## 2023-12-07 RX ORDER — ACYCLOVIR 400 MG/1
400 TABLET ORAL 2 TIMES DAILY
Qty: 60 TABLET | Refills: 0 | Status: SHIPPED | OUTPATIENT
Start: 2023-12-07 | End: 2024-02-06

## 2023-12-11 DIAGNOSIS — E78.5 HYPERLIPIDEMIA LDL GOAL <130: ICD-10-CM

## 2023-12-11 RX ORDER — ATORVASTATIN CALCIUM 40 MG/1
TABLET, FILM COATED ORAL
Qty: 24 TABLET | Refills: 0 | Status: SHIPPED | OUTPATIENT
Start: 2023-12-11 | End: 2024-03-04

## 2024-02-05 DIAGNOSIS — B00.9 HERPES SIMPLEX VIRUS INFECTION: ICD-10-CM

## 2024-02-06 ENCOUNTER — TRANSFERRED RECORDS (OUTPATIENT)
Dept: HEALTH INFORMATION MANAGEMENT | Facility: CLINIC | Age: 79
End: 2024-02-06
Payer: COMMERCIAL

## 2024-02-06 DIAGNOSIS — G43.009 MIGRAINE WITHOUT AURA AND WITHOUT STATUS MIGRAINOSUS, NOT INTRACTABLE: ICD-10-CM

## 2024-02-06 RX ORDER — ACYCLOVIR 400 MG/1
400 TABLET ORAL 2 TIMES DAILY
Qty: 180 TABLET | Refills: 0 | Status: SHIPPED | OUTPATIENT
Start: 2024-02-06 | End: 2024-08-12

## 2024-02-07 RX ORDER — GABAPENTIN 100 MG/1
100 CAPSULE ORAL 3 TIMES DAILY
Qty: 270 CAPSULE | Refills: 0 | Status: SHIPPED | OUTPATIENT
Start: 2024-02-07 | End: 2024-05-06

## 2024-02-20 DIAGNOSIS — I10 HYPERTENSION GOAL BP (BLOOD PRESSURE) < 140/90: ICD-10-CM

## 2024-02-20 RX ORDER — CHLORTHALIDONE 25 MG/1
12.5 TABLET ORAL DAILY
Qty: 45 TABLET | Refills: 0 | Status: SHIPPED | OUTPATIENT
Start: 2024-02-20 | End: 2024-06-04

## 2024-03-02 DIAGNOSIS — E78.5 HYPERLIPIDEMIA LDL GOAL <130: ICD-10-CM

## 2024-03-04 RX ORDER — ATORVASTATIN CALCIUM 40 MG/1
TABLET, FILM COATED ORAL
Qty: 24 TABLET | Refills: 1 | Status: SHIPPED | OUTPATIENT
Start: 2024-03-04 | End: 2024-06-28

## 2024-04-08 ENCOUNTER — MYC MEDICAL ADVICE (OUTPATIENT)
Dept: FAMILY MEDICINE | Facility: CLINIC | Age: 79
End: 2024-04-08
Payer: COMMERCIAL

## 2024-04-09 ASSESSMENT — ANXIETY QUESTIONNAIRES
4. TROUBLE RELAXING: MORE THAN HALF THE DAYS
7. FEELING AFRAID AS IF SOMETHING AWFUL MIGHT HAPPEN: NEARLY EVERY DAY
5. BEING SO RESTLESS THAT IT IS HARD TO SIT STILL: MORE THAN HALF THE DAYS
GAD7 TOTAL SCORE: 19
7. FEELING AFRAID AS IF SOMETHING AWFUL MIGHT HAPPEN: NEARLY EVERY DAY
6. BECOMING EASILY ANNOYED OR IRRITABLE: NEARLY EVERY DAY
2. NOT BEING ABLE TO STOP OR CONTROL WORRYING: NEARLY EVERY DAY
1. FEELING NERVOUS, ANXIOUS, OR ON EDGE: NEARLY EVERY DAY
8. IF YOU CHECKED OFF ANY PROBLEMS, HOW DIFFICULT HAVE THESE MADE IT FOR YOU TO DO YOUR WORK, TAKE CARE OF THINGS AT HOME, OR GET ALONG WITH OTHER PEOPLE?: SOMEWHAT DIFFICULT
GAD7 TOTAL SCORE: 19
3. WORRYING TOO MUCH ABOUT DIFFERENT THINGS: NEARLY EVERY DAY
IF YOU CHECKED OFF ANY PROBLEMS ON THIS QUESTIONNAIRE, HOW DIFFICULT HAVE THESE PROBLEMS MADE IT FOR YOU TO DO YOUR WORK, TAKE CARE OF THINGS AT HOME, OR GET ALONG WITH OTHER PEOPLE: SOMEWHAT DIFFICULT

## 2024-04-09 NOTE — TELEPHONE ENCOUNTER
RN called patient and scheduled    Appointments in Next Year      Apr 12, 2024  3:00 PM  (Arrive by 2:40 PM)  Provider Visit with Lizett Parikh MD  [facility] ([facility] ) [phone]     Martha Becerra RN  [facility] - Registered Nurse  Clinic T[facility] [place]   April 9, 2024

## 2024-04-12 ENCOUNTER — OFFICE VISIT (OUTPATIENT)
Dept: FAMILY MEDICINE | Facility: CLINIC | Age: 79
End: 2024-04-12
Payer: COMMERCIAL

## 2024-04-12 VITALS
HEART RATE: 80 BPM | TEMPERATURE: 97.6 F | HEIGHT: 62 IN | DIASTOLIC BLOOD PRESSURE: 70 MMHG | OXYGEN SATURATION: 96 % | WEIGHT: 147.7 LBS | RESPIRATION RATE: 15 BRPM | BODY MASS INDEX: 27.18 KG/M2 | SYSTOLIC BLOOD PRESSURE: 138 MMHG

## 2024-04-12 DIAGNOSIS — I10 HYPERTENSION GOAL BP (BLOOD PRESSURE) < 140/90: ICD-10-CM

## 2024-04-12 DIAGNOSIS — F41.1 GAD (GENERALIZED ANXIETY DISORDER): ICD-10-CM

## 2024-04-12 DIAGNOSIS — F33.1 MODERATE RECURRENT MAJOR DEPRESSION (H): Primary | ICD-10-CM

## 2024-04-12 PROCEDURE — 99214 OFFICE O/P EST MOD 30 MIN: CPT | Performed by: FAMILY MEDICINE

## 2024-04-12 RX ORDER — FLUOXETINE 10 MG/1
CAPSULE ORAL
Qty: 50 CAPSULE | Refills: 0 | Status: SHIPPED | OUTPATIENT
Start: 2024-04-12 | End: 2024-05-07 | Stop reason: SINTOL

## 2024-04-12 ASSESSMENT — PAIN SCALES - GENERAL: PAINLEVEL: MILD PAIN (2)

## 2024-04-12 ASSESSMENT — PATIENT HEALTH QUESTIONNAIRE - PHQ9: SUM OF ALL RESPONSES TO PHQ QUESTIONS 1-9: 12

## 2024-04-12 NOTE — PROGRESS NOTES
"  Assessment & Plan     Moderate recurrent major depression (H)  ASHWIN (generalized anxiety disorder)  Patient has history of anxiety and depression.   She has been on fluoxetine in the past with good results.   She has been off this for several years.   She tried duloxetine without good improvement in the past.   She has noted feeling more down, less ambition, less enjoyment.   She denies any suicidal ideation.   Recommend restarting fluoxetine 10 mg daily for one week then 20 mg daily.   Recheck in June at her physical or sooner if needed.   She will reach out near the end of this first prescription through [employer] to update on progress. Sooner as needed.   - FLUoxetine (PROZAC) 10 MG capsule; 10 mg daily for one week, then 20 mg daily.    Hypertension goal BP (blood pressure) < 140/90  Initially elevation.   On recheck at end of visit was improved.   Recheck in June.             BMI  Estimated body mass index is 27.01 kg/m  as calculated from the following:    Height as of this encounter: 1.575 m (5' 2\").    Weight as of this encounter: 67 kg (147 lb 11.2 oz).             Thom Rivera is a 79 year old, presenting for the following health issues:  Follow Up (Follow up on mental health)        4/12/2024     2:48 PM   Additional Questions   Roomed by Debora ESQUIVEL   Accompanied by Self         4/12/2024     2:48 PM   Patient Reported Additional Medications   Patient reports taking the following new medications None     History of Present Illness       Mental Health Follow-up:  Patient presents to follow-up on Depression & Anxiety.Patient's depression since last visit has been:  Bad  The patient is not having other symptoms associated with depression.  Patient's anxiety since last visit has been:  Bad  The patient is not having other symptoms associated with anxiety.  Any significant life events: financial concerns, health concerns and other  Patient is feeling anxious or having panic attacks.  Patient has no " "concerns about alcohol or drug use.    She eats 0-1 servings of fruits and vegetables daily.She consumes 0 sweetened beverage(s) daily.She exercises with enough effort to increase her heart rate 30 to 60 minutes per day.  She exercises with enough effort to increase her heart rate 7 days per week.   She is taking medications regularly.     Reports issues with feeling down. She is   Denies any suicidal ideation.   She has trouble with concentration.   She is not finding pleasure in reading. Trouble remembering what she is watching.   Wants to be in bed.   Doesn't have help with her  who has vascular dementia.                     Objective    /70   Pulse 80   Temp 97.6  F (36.4  C) (Temporal)   Resp 15   Ht 1.575 m (5' 2\")   Wt 67 kg (147 lb 11.2 oz)   SpO2 96%   BMI 27.01 kg/m    Body mass index is 27.01 kg/m .  Physical Exam   GENERAL: alert and no distress  PSYCH: mentation appears normal, fatigued, and judgement and insight intact            Signed Electronically by: Lizett Parikh MD    "

## 2024-05-05 DIAGNOSIS — G43.009 MIGRAINE WITHOUT AURA AND WITHOUT STATUS MIGRAINOSUS, NOT INTRACTABLE: ICD-10-CM

## 2024-05-06 RX ORDER — GABAPENTIN 100 MG/1
100 CAPSULE ORAL 3 TIMES DAILY
Qty: 270 CAPSULE | Refills: 0 | Status: SHIPPED | OUTPATIENT
Start: 2024-05-06 | End: 2024-06-28

## 2024-05-07 ENCOUNTER — MYC MEDICAL ADVICE (OUTPATIENT)
Dept: FAMILY MEDICINE | Facility: CLINIC | Age: 79
End: 2024-05-07
Payer: COMMERCIAL

## 2024-05-07 ENCOUNTER — TRANSFERRED RECORDS (OUTPATIENT)
Dept: HEALTH INFORMATION MANAGEMENT | Facility: CLINIC | Age: 79
End: 2024-05-07
Payer: COMMERCIAL

## 2024-05-07 DIAGNOSIS — F41.1 GAD (GENERALIZED ANXIETY DISORDER): ICD-10-CM

## 2024-05-07 DIAGNOSIS — F33.1 MODERATE RECURRENT MAJOR DEPRESSION (H): Primary | ICD-10-CM

## 2024-05-07 RX ORDER — ESCITALOPRAM OXALATE 10 MG/1
10 TABLET ORAL DAILY
Qty: 30 TABLET | Refills: 0 | Status: SHIPPED | OUTPATIENT
Start: 2024-05-07 | End: 2024-06-04

## 2024-05-07 NOTE — TELEPHONE ENCOUNTER
Office visit 4/12/24 for anxiety and depression. She is requesting alternative due to unwanted side effects.     Cristin Good BSN, RN

## 2024-06-01 ENCOUNTER — HEALTH MAINTENANCE LETTER (OUTPATIENT)
Age: 79
End: 2024-06-01

## 2024-06-04 DIAGNOSIS — I10 HYPERTENSION GOAL BP (BLOOD PRESSURE) < 140/90: ICD-10-CM

## 2024-06-04 DIAGNOSIS — F41.1 GAD (GENERALIZED ANXIETY DISORDER): ICD-10-CM

## 2024-06-04 DIAGNOSIS — F33.1 MODERATE RECURRENT MAJOR DEPRESSION (H): ICD-10-CM

## 2024-06-04 RX ORDER — CHLORTHALIDONE 25 MG/1
12.5 TABLET ORAL DAILY
Qty: 45 TABLET | Refills: 0 | Status: SHIPPED | OUTPATIENT
Start: 2024-06-04 | End: 2024-06-28

## 2024-06-04 RX ORDER — ESCITALOPRAM OXALATE 10 MG/1
10 TABLET ORAL DAILY
Qty: 30 TABLET | Refills: 0 | Status: SHIPPED | OUTPATIENT
Start: 2024-06-04 | End: 2024-06-28 | Stop reason: SINTOL

## 2024-06-25 SDOH — HEALTH STABILITY: PHYSICAL HEALTH: ON AVERAGE, HOW MANY MINUTES DO YOU ENGAGE IN EXERCISE AT THIS LEVEL?: 70 MIN

## 2024-06-25 SDOH — HEALTH STABILITY: PHYSICAL HEALTH: ON AVERAGE, HOW MANY DAYS PER WEEK DO YOU ENGAGE IN MODERATE TO STRENUOUS EXERCISE (LIKE A BRISK WALK)?: 7 DAYS

## 2024-06-25 ASSESSMENT — SOCIAL DETERMINANTS OF HEALTH (SDOH): HOW OFTEN DO YOU GET TOGETHER WITH FRIENDS OR RELATIVES?: PATIENT DECLINED

## 2024-06-28 ENCOUNTER — OFFICE VISIT (OUTPATIENT)
Dept: FAMILY MEDICINE | Facility: CLINIC | Age: 79
End: 2024-06-28
Payer: COMMERCIAL

## 2024-06-28 VITALS
RESPIRATION RATE: 16 BRPM | SYSTOLIC BLOOD PRESSURE: 136 MMHG | DIASTOLIC BLOOD PRESSURE: 64 MMHG | OXYGEN SATURATION: 97 % | WEIGHT: 138 LBS | HEIGHT: 62 IN | BODY MASS INDEX: 25.4 KG/M2 | HEART RATE: 75 BPM | TEMPERATURE: 97.1 F

## 2024-06-28 DIAGNOSIS — E04.1 THYROID NODULE: ICD-10-CM

## 2024-06-28 DIAGNOSIS — I10 HYPERTENSION GOAL BP (BLOOD PRESSURE) < 140/90: ICD-10-CM

## 2024-06-28 DIAGNOSIS — F33.1 MODERATE RECURRENT MAJOR DEPRESSION (H): ICD-10-CM

## 2024-06-28 DIAGNOSIS — G43.009 MIGRAINE WITHOUT AURA AND WITHOUT STATUS MIGRAINOSUS, NOT INTRACTABLE: ICD-10-CM

## 2024-06-28 DIAGNOSIS — H61.23 BILATERAL IMPACTED CERUMEN: ICD-10-CM

## 2024-06-28 DIAGNOSIS — Z12.31 ENCOUNTER FOR SCREENING MAMMOGRAM FOR BREAST CANCER: ICD-10-CM

## 2024-06-28 DIAGNOSIS — E28.39 ESTROGEN DEFICIENCY: ICD-10-CM

## 2024-06-28 DIAGNOSIS — F41.1 GAD (GENERALIZED ANXIETY DISORDER): ICD-10-CM

## 2024-06-28 DIAGNOSIS — Z00.00 ENCOUNTER FOR MEDICARE ANNUAL WELLNESS EXAM: Primary | ICD-10-CM

## 2024-06-28 DIAGNOSIS — E78.5 HYPERLIPIDEMIA LDL GOAL <130: ICD-10-CM

## 2024-06-28 DIAGNOSIS — M85.80 OSTEOPENIA, UNSPECIFIED LOCATION: ICD-10-CM

## 2024-06-28 PROCEDURE — 99214 OFFICE O/P EST MOD 30 MIN: CPT | Mod: 25 | Performed by: FAMILY MEDICINE

## 2024-06-28 PROCEDURE — 69209 REMOVE IMPACTED EAR WAX UNI: CPT | Mod: 50 | Performed by: FAMILY MEDICINE

## 2024-06-28 PROCEDURE — 80061 LIPID PANEL: CPT | Performed by: FAMILY MEDICINE

## 2024-06-28 PROCEDURE — 80053 COMPREHEN METABOLIC PANEL: CPT | Performed by: FAMILY MEDICINE

## 2024-06-28 PROCEDURE — 36415 COLL VENOUS BLD VENIPUNCTURE: CPT | Performed by: FAMILY MEDICINE

## 2024-06-28 PROCEDURE — G0439 PPPS, SUBSEQ VISIT: HCPCS | Performed by: FAMILY MEDICINE

## 2024-06-28 RX ORDER — GABAPENTIN 100 MG/1
100 CAPSULE ORAL 3 TIMES DAILY PRN
Qty: 270 CAPSULE | Refills: 1 | Status: SHIPPED | OUTPATIENT
Start: 2024-06-28

## 2024-06-28 RX ORDER — CHLORTHALIDONE 25 MG/1
12.5 TABLET ORAL DAILY
Qty: 45 TABLET | Refills: 3 | Status: SHIPPED | OUTPATIENT
Start: 2024-06-28

## 2024-06-28 RX ORDER — ATORVASTATIN CALCIUM 40 MG/1
TABLET, FILM COATED ORAL
Qty: 24 TABLET | Refills: 3 | Status: SHIPPED | OUTPATIENT
Start: 2024-06-28

## 2024-06-28 NOTE — PATIENT INSTRUCTIONS
"Patient Education   Preventive Care Advice   This is general advice we often give to help people stay healthy. Your care team may have specific advice just for you. Please talk to your care team about your own preventive care needs.  Lifestyle  Exercise at least 150 minutes each week (30 minutes a day, 5 days a week).  Do muscle strengthening activities 2 days a week. These help control your weight and prevent disease.  No smoking.  Wear sunscreen to prevent skin cancer.  Have your home tested for radon every 2 to 5 years. Radon is a colorless, odorless gas that can harm your lungs. To learn more, go to www.health.[facility].mn.us and search for \"Radon in Homes.\"  Keep guns unloaded and locked up in a safe place like a safe or gun vault, or, use a gun lock and hide the keys. Always lock away bullets separately. To learn more, visit [employer].[place].gov and search for \"safe gun storage.\"  Nutrition  Eat 5 or more servings of fruits and vegetables each day.  Try wheat bread, brown rice and whole grain pasta (instead of white bread, rice, and pasta).  Get enough calcium and vitamin D. Check the label on foods and aim for 100% of the RDA (recommended daily allowance).  Regular exams  Have a dental exam and cleaning every 6 months.  See your health care team every year to talk about:  Any changes in your health.  Any medicines your care team has prescribed.  Preventive care, family planning, and ways to prevent chronic diseases.  Shots (vaccines)   HPV shots (up to age 26), if you've never had them before.  Hepatitis B shots (up to age 59), if you've never had them before.  COVID-19 shot: Get this shot when it's due.  Flu shot: Get a flu shot every year.  Tetanus shot: Get a tetanus shot every 10 years.  Pneumococcal, hepatitis A, and RSV shots: Ask your care team if you need these based on your risk.  Shingles shot (for age 50 and up).  General health tests  Diabetes screening:  Starting at age 35, Get screened for diabetes at least " every 3 years.  If you are younger than age 35, ask your care team if you should be screened for diabetes.  Cholesterol test: At age 39, start having a cholesterol test every 5 years, or more often if advised.  Bone density scan (DEXA): At age 50, ask your care team if you should have this scan for osteoporosis (brittle bones).  Hepatitis C: Get tested at least once in your life.  Abdominal aortic aneurysm screening: Talk to your doctor about having this screening if you:  Have ever smoked; and  Are biologically male; and  Are between the ages of 65 and 75.  STIs (sexually transmitted infections)  Before age 24: Ask your care team if you should be screened for STIs.  After age 24: Get screened for STIs if you're at risk. You are at risk for STIs (including HIV) if:  You are sexually active with more than one person.  You don't use condoms every time.  You or a partner was diagnosed with a sexually transmitted infection.  If you are at risk for HIV, ask about PrEP medicine to prevent HIV.  Get tested for HIV at least once in your life, whether you are at risk for HIV or not.  Cancer screening tests  Cervical cancer screening: If you have a cervix, begin getting regular cervical cancer screening tests at age 21. Most people who have regular screenings with normal results can stop after age 65. Talk about this with your provider.  Breast cancer scan (mammogram): If you've ever had breasts, begin having regular mammograms starting at age 40. This is a scan to check for breast cancer.  Colon cancer screening: It is important to start screening for colon cancer at age 45.  Have a colonoscopy test every 10 years (or more often if you're at risk) Or, ask your provider about stool tests like a FIT test every year or Cologuard test every 3 years.  To learn more about your testing options, visit: www.[employer]/339307.pdf.  For help making a decision, visit: [email or web address].  Prostate cancer screening test: If you have a  prostate and are age 55 to 69, ask your provider if you would benefit from a yearly prostate cancer screening test.  Lung cancer screening: If you are a current or former smoker age 50 to 80, ask your care team if ongoing lung cancer screenings are right for you.  For informational purposes only. Not to replace the advice of your health care provider. Copyright   2023 [facility]. All rights reserved. Clinically reviewed by the  [employer] [facility]. [id number] - REV 04/24.  Preventing Falls: Care Instructions  Injuries and health problems such as trouble walking or poor eyesight can increase your risk of falling. So can some medicines. But there are things you can do to help prevent falls. You can exercise to get stronger. You can also arrange your home to make it safer.    Talk to your doctor about the medicines you take. Ask if any of them increase the risk of falls and whether they can be changed or stopped.   Try to exercise regularly. It can help improve your strength and balance. This can help lower your risk of falling.     Practice fall safety and prevention.    Wear low-heeled shoes that fit well and give your feet good support. Talk to your doctor if you have foot problems that make this hard.  Carry a cellphone or wear a medical alert device that you can use to call for help.  Use stepladders instead of chairs to reach high objects. Don't climb if you're at risk for falls. Ask for help, if needed.  Wear the correct eyeglasses, if you need them.    Make your home safer.    Remove rugs, cords, clutter, and furniture from walkways.  Keep your house well lit. Use night-lights in hallways and bathrooms.  Install and use sturdy handrails on stairways.  Wear nonskid footwear, even inside. Don't walk barefoot or in socks without shoes.    Be safe outside.    Use handrails, curb cuts, and ramps whenever possible.  Keep your hands free by using a shoulder bag or backpack.  Try  "to walk in well-lit areas. Watch out for uneven ground, changes in pavement, and debris.  Be careful in the winter. Walk on the grass or gravel when sidewalks are slippery. Use de-icer on steps and walkways. Add non-slip devices to shoes.    Put grab bars and nonskid mats in your shower or tub and near the toilet. Try to use a shower chair or bath bench when bathing.   Get into a tub or shower by putting in your weaker leg first. Get out with your strong side first. Have a phone or medical alert device in the bathroom with you.   Where can you learn more?  Go to https://www.[employer].net/patiented  Enter G117 in the search box to learn more about \"Preventing Falls: Care Instructions.\"  Current as of: July 17, 2023               Content Version: 14.0    6441-2178 [employer].   Care instructions adapted under license by your healthcare professional. If you have questions about a medical condition or this instruction, always ask your healthcare professional. [employer] disclaims any warranty or liability for your use of this information.      Hearing Loss: Care Instructions  Overview     Hearing loss is a sudden or slow decrease in how well you hear. It can range from slight to profound. Permanent hearing loss can occur with aging. It also can happen when you are exposed long-term to loud noise. Examples include listening to loud music, riding motorcycles, or being around other loud machines.  Hearing loss can affect your work and home life. It can make you feel lonely or depressed. You may feel that you have lost your independence. But hearing aids and other devices can help you hear better and feel connected to others.  Follow-up care is a key part of your treatment and safety. Be sure to make and go to all appointments, and call your doctor if you are having problems. It's also a good idea to know your test results and keep a list of the medicines you take.  How can you care for yourself " at home?  Avoid loud noises whenever possible. This helps keep your hearing from getting worse.  Always wear hearing protection around loud noises.  Wear a hearing aid as directed.  A professional can help you pick a hearing aid that will work best for you.  You can also get hearing aids over the counter for mild to moderate hearing loss.  Have hearing tests as your doctor suggests. They can show whether your hearing has changed. Your hearing aid may need to be adjusted.  Use other devices as needed. These may include:  Telephone amplifiers and hearing aids that can connect to a television, stereo, radio, or microphone.  Devices that use lights or vibrations. These alert you to the doorbell, a ringing telephone, or a baby monitor.  Television closed-captioning. This shows the words at the bottom of the screen. Most new TVs can do this.  TTY (text telephone). This lets you type messages back and forth on the telephone instead of talking or listening. These devices are also called TDD. When messages are typed on the keyboard, they are sent over the phone line to a receiving TTY. The message is shown on a monitor.  Use text messaging, social media, and email if it is hard for you to communicate by telephone.  Try to learn a listening technique called speechreading. It is not lipreading. You pay attention to people's gestures, expressions, posture, and tone of voice. These clues can help you understand what a person is saying. Face the person you are talking to, and have them face you. Make sure the lighting is good. You need to see the other person's face clearly.  Think about counseling if you need help to adjust to your hearing loss.  When should you call for help?  Watch closely for changes in your health, and be sure to contact your doctor if:    You think your hearing is getting worse.     You have new symptoms, such as dizziness or nausea.   Where can you learn more?  Go to  "https://www.[employer].net/patiented  Enter R798 in the search box to learn more about \"Hearing Loss: Care Instructions.\"  Current as of: September 27, 2023               Content Version: 14.0    2554-2954 [employer].   Care instructions adapted under license by your healthcare professional. If you have questions about a medical condition or this instruction, always ask your healthcare professional. [employer] disclaims any warranty or liability for your use of this information.      Bladder Training: Care Instructions  Your Care Instructions     Bladder training is used to treat urge incontinence and stress incontinence. Urge incontinence means that the need to urinate comes on so fast that you can't get to a toilet in time. Stress incontinence means that you leak urine because of pressure on your bladder. For example, it may happen when you laugh, cough, or lift something heavy.  Bladder training can increase how long you can wait before you have to urinate. It can also help your bladder hold more urine. And it can give you better control over the urge to urinate.  It is important to remember that bladder training takes a few weeks to a few months to make a difference. You may not see results right away, but don't give up.  Follow-up care is a key part of your treatment and safety. Be sure to make and go to all appointments, and call your doctor if you are having problems. It's also a good idea to know your test results and keep a list of the medicines you take.  How can you care for yourself at home?  Work with your doctor to come up with a bladder training program that is right for you. You may use one or more of the following methods.  Delayed urination  In the beginning, try to keep from urinating for 5 minutes after you first feel the need to go.  While you wait, take deep, slow breaths to relax. Kegel exercises can also help you delay the need to go to the bathroom.  After some " "practice, when you can easily wait 5 minutes to urinate, try to wait 10 minutes before you urinate.  Slowly increase the waiting period until you are able to control when you have to urinate.  Scheduled urination  Empty your bladder when you first wake up in the morning.  Schedule times throughout the day when you will urinate.  Start by going to the bathroom every hour, even if you don't need to go.  Slowly increase the time between trips to the bathroom.  When you have found a schedule that works well for you, keep doing it.  If you wake up during the night and have to urinate, do it. Apply your schedule to waking hours only.  Kegel exercises  These tighten and strengthen pelvic muscles, which can help you control the flow of urine. (If doing these exercises causes pain, stop doing them and talk with your doctor.) To do Kegel exercises:  Squeeze your muscles as if you were trying not to pass gas. Or squeeze your muscles as if you were stopping the flow of urine. Your belly, legs, and buttocks shouldn't move.  Hold the squeeze for 3 seconds, then relax for 5 to 10 seconds.  Start with 3 seconds, then add 1 second each week until you are able to squeeze for 10 seconds.  Repeat the exercise 10 times a session. Do 3 to 8 sessions a day.  When should you call for help?  Watch closely for changes in your health, and be sure to contact your doctor if:    Your incontinence is getting worse.     You do not get better as expected.   Where can you learn more?  Go to https://www.[employer].com.net/patiented  Enter V684 in the search box to learn more about \"Bladder Training: Care Instructions.\"  Current as of: November 15, 2023               Content Version: 14.0    1105-2603 [employer]   Care instructions adapted under license by your healthcare professional. If you have questions about a medical condition or this instruction, always ask your healthcare professional. [employer] disclaims any warranty " or liability for your use of this information.

## 2024-06-28 NOTE — PROGRESS NOTES
Preventive Care Visit  [facility] [place] Parikh MD, Family Medicine  Jun 28, 2024      Assessment & Plan     Encounter for Medicare annual wellness exam  See counseling messages    Moderate recurrent major depression (H)  ASHWIN (generalized anxiety disorder)  Patient reports she is doing well at this time.  She had stopped the escitalopram because she did not tolerate it but she feels that she does not need medication at this point.  She declines any counseling at this time.  She reports she will reach out if needed.    Hypertension goal BP (blood pressure) < 140/90  Doing well. Well controlled. Tolerating medication.  No change in plan.    - chlorthalidone (HYGROTON) 25 MG tablet; Take 0.5 tablets (12.5 mg) by mouth daily  - Comprehensive metabolic panel (BMP + Alb, Alk Phos, ALT, AST, Total. Bili, TP); Future  - Comprehensive metabolic panel (BMP + Alb, Alk Phos, ALT, AST, Total. Bili, TP)    Thyroid nodule  Patient had thyroid nodule back in 2020.  She was to get an FNA but did not.  Recheck thyroid ultrasound at this time and determine if FNA is still needed or if she needs to be seen by specialty.  Patient agrees to plan.  - US Thyroid; Future    Migraine without aura and without status migrainosus, not intractable  Doing well. Well controlled. Tolerating medication.  No change in plan.    - gabapentin (NEURONTIN) 100 MG capsule; Take 1 capsule (100 mg) by mouth 3 times daily as needed for other (headache)    Hyperlipidemia LDL goal <130  Awaiting results. Dose adjustment as needed.    - atorvastatin (LIPITOR) 40 MG tablet; TAKE ONE TABLET BY MOUTH TWICE WEEKLY  - Lipid panel reflex to direct LDL Non-fasting; Future  - Lipid panel reflex to direct LDL Non-fasting    Osteopenia  Estrogen deficiency  Due for recheck of DEXA.  Order placed  - DEXA HIP/PELVIS/SPINE - Future; Future    Bilateral impacted cerumen  Patient with bilateral impacted cerumen and ears were irrigated.  She should  recheck as needed.  - REMOVE IMPACTED CERUMEN    Encounter for screening mammogram for breast cancer  Order placed  - MA Screen Bilateral w/Catarino; Future            Counseling  Appropriate preventive services were discussed with this patient, including applicable screening as appropriate for fall prevention, nutrition, physical activity, Tobacco-use cessation, weight loss and cognition.  Checklist reviewing preventive services available has been given to the patient.  Reviewed patient's diet, addressing concerns and/or questions.   The patient was provided with written information regarding signs of hearing loss.   Information on urinary incontinence and treatment options given to patient.           Thom Rivera is a 79 year old, presenting for the following:  Physical        6/28/2024     2:28 PM   Additional Questions   Roomed by YK   Accompanied by self         Health Care Directive  Patient does not have a Health Care Directive or Living Will: Discussed advance care planning with patient; however, patient declined at this time.    HPI    Patient requests that refills are sent in, but to not fill it until she is ready for .    Hypertension Follow-up    Do you check your blood pressure regularly outside of the clinic? No   Are you following a low salt diet? Yes  Are your blood pressures ever more than 140 on the top number (systolic) OR more   than 90 on the bottom number (diastolic), for example 140/90? N/a    Hyperlipidemia Follow-Up    Are you regularly taking any medication or supplement to lower your cholesterol?   Yes- atorvastatin  Are you having muscle aches or other side effects that you think could be caused by your cholesterol lowering medication?  No      Dropped a pallet a week ago on left great toe. Patient states toe has been draining, red, swollen, and painful.     Answers submitted by the patient for this visit:  Patient Health Questionnaire (Submitted on 6/28/2024)  If you checked off  any problems, how difficult have these problems made it for you to do your work, take care of things at home, or get along with other people?: Not difficult at all  PHQ9 TOTAL SCORE: 2    Depression and Anxiety   How are you doing with your depression since your last visit? Improved   How are you doing with your anxiety since your last visit?  Improved   Are you having other symptoms that might be associated with depression or anxiety? No  Have you had a significant life event? OTHER:  with health problems.     Do you have any concerns with your use of alcohol or other drugs? No  Stopped the escitalopram due to dizziness.   She didn't tolerate cymbalta. Fluoxetine didn't help.   She feels like she is handling things better. Prefers no medication.       Social History     Tobacco Use    Smoking status: Never    Smokeless tobacco: Never   Vaping Use    Vaping status: Never Used   Substance Use Topics    Alcohol use: Not Currently     Comment: 5 drinks per week / wine     Drug use: Never         9/20/2023     1:35 PM 4/12/2024     3:16 PM 6/28/2024     2:02 PM   PHQ   PHQ-9 Total Score 6 12 2   Q9: Thoughts of better off dead/self-harm past 2 weeks Not at all Not at all Not at all         9/27/2022    10:45 AM 9/20/2023     1:37 PM 4/9/2024    12:14 PM   ASHWIN-7 SCORE   Total Score 3 (minimal anxiety)  19 (severe anxiety)   Total Score 3 1 19         Suicide Assessment Five-step Evaluation and Treatment (SAFE-T)        Migraine   Since your last clinic visit, how have your headaches changed?  No change. Doing well.   How often are you getting headaches or migraines? rare   Are you able to do normal daily activities when you have a migraine? Yes  Are you taking rescue/relief medications? (Select all that apply) sumatriptan (Imitrex)  How helpful is your rescue/relief medication?  I get total relief  Are you taking any medications to prevent migraines? (Select all that apply)  No  In the past 4 weeks, how often have  you gone to urgent care or the emergency room because of your headaches?  0      6/25/2024   General Health   How would you rate your overall physical health? Good   Feel stress (tense, anxious, or unable to sleep) To some extent      (!) STRESS CONCERN      6/25/2024   Nutrition   Diet: Regular (no restrictions)            6/25/2024   Exercise   Days per week of moderate/strenous exercise 7 days   Average minutes spent exercising at this level 70 min            6/25/2024   Social Factors   Frequency of gathering with friends or relatives Patient declined   Worry food won't last until get money to buy more No   Food not last or not have enough money for food? No   Do you have housing? (Housing is defined as stable permanent housing and does not include staying ouside in a car, in a tent, in an abandoned building, in an overnight shelter, or couch-surfing.) Yes   Are you worried about losing your housing? No   Lack of transportation? No   Unable to get utilities (heat,electricity)? No            6/28/2024   Fall Risk   Reason Gait Speed Test Not Completed Patient declines   Reason for decline Due to medication and recent toe injury             6/25/2024   Activities of Daily Living- Home Safety   Needs help with the following daily activites None of the above   Safety concerns in the home None of the above            6/25/2024   Dental   Dentist two times every year? Yes            6/25/2024   Hearing Screening   Hearing concerns? (!) TROUBLE UNDERSTANDING SOFT OR WHISPERED SPEECH.            6/25/2024   Driving Risk Screening   Patient/family members have concerns about driving No            6/25/2024   General Alertness/Fatigue Screening   Have you been more tired than usual lately? No            6/25/2024   Urinary Incontinence Screening   Bothered by leaking urine in past 6 months Yes            6/25/2024   TB Screening   Were you born outside of the [place]? No          Today's PHQ-9 Score:       6/28/2024     2:02  PM   PHQ-9 SCORE   PHQ-9 Total Score MyChart 2 (Minimal depression)   PHQ-9 Total Score 2         6/25/2024   Substance Use   Alcohol more than 3/day or more than 7/wk No   Do you have a current opioid prescription? No   How severe/bad is pain from 1 to 10? 2/10   Do you use any other substances recreationally? No        Social History     Tobacco Use    Smoking status: Never    Smokeless tobacco: Never   Vaping Use    Vaping status: Never Used   Substance Use Topics    Alcohol use: Not Currently     Comment: 5 drinks per week / wine     Drug use: Never          Mammogram Screening - After age 74- determine frequency with patient based on health status, life expectancy and patient goals    ASCVD Risk   The 10-year ASCVD risk score (Nina DK, et al., 2019) is: 29.2%    Values used to calculate the score:      Age: 79 years      Sex: Female      Is Non- : No      Diabetic: No      Tobacco smoker: No      Systolic Blood Pressure: 136 mmHg      Is BP treated: Yes      HDL Cholesterol: 72 mg/dL      Total Cholesterol: 209 mg/dL            Reviewed and updated as needed this visit by Provider                    BP Readings from Last 3 Encounters:   06/28/24 136/64   04/12/24 138/70   09/28/23 136/74    Wt Readings from Last 3 Encounters:   06/28/24 62.6 kg (138 lb)   04/12/24 67 kg (147 lb 11.2 oz)   09/27/22 63.8 kg (140 lb 11.2 oz)                  Current providers sharing in care for this patient include:  Patient Care Team:  Lizett Parikh MD as PCP - General (Family Practice)  Lizett Parikh MD as Assigned PCP  Vito Byers MD as Assigned Surgical Provider    The following health maintenance items are reviewed in Epic and correct as of today:  Health Maintenance   Topic Date Due    ZOSTER IMMUNIZATION (2 of 3) 12/19/2012    DTAP/TDAP/TD IMMUNIZATION (1 - Tdap) 07/12/2013    MEDICARE ANNUAL WELLNESS VISIT  12/01/2021    COVID-19 Vaccine (7 - 2023-24 season) 02/22/2024  "   DEXA  06/04/2024    BMP  09/25/2024    LIPID  09/25/2024    PHQ-9  12/28/2024    ASHWIN ASSESSMENT  04/12/2025    ANNUAL REVIEW OF HM ORDERS  04/12/2025    FALL RISK ASSESSMENT  06/28/2025    GLUCOSE  09/25/2026    ADVANCE CARE PLANNING  09/27/2027    HEPATITIS C SCREENING  Completed    DEPRESSION ACTION PLAN  Completed    MIGRAINE ACTION PLAN  Completed    INFLUENZA VACCINE  Completed    Pneumococcal Vaccine: 65+ Years  Completed    RSV VACCINE (Pregnancy & 60+)  Completed    IPV IMMUNIZATION  Aged Out    HPV IMMUNIZATION  Aged Out    MENINGITIS IMMUNIZATION  Aged Out    RSV MONOCLONAL ANTIBODY  Aged Out    URINE DRUG SCREEN  Discontinued    MAMMO SCREENING  Discontinued    COLORECTAL CANCER SCREENING  Discontinued            Objective    Exam  /64   Pulse 75   Temp 97.1  F (36.2  C) (Temporal)   Resp 16   Ht 1.575 m (5' 2\")   Wt 62.6 kg (138 lb)   SpO2 97%   BMI 25.24 kg/m     Estimated body mass index is 25.24 kg/m  as calculated from the following:    Height as of this encounter: 1.575 m (5' 2\").    Weight as of this encounter: 62.6 kg (138 lb).    Physical Exam  GENERAL: alert and no distress  EYES: Eyes grossly normal to inspection, PERRL and conjunctivae and sclerae normal  HENT: impacted cerumen bilaterally, nose and mouth without ulcers or lesions  NECK: no adenopathy, no asymmetry, masses, or scars  RESP: lungs clear to auscultation - no rales, rhonchi or wheezes  CV: regular rate and rhythm, normal S1 S2, no S3 or S4, no murmur, click or rub, no peripheral edema  ABDOMEN: soft, nontender, no hepatosplenomegaly, no masses and bowel sounds normal  MS: no gross musculoskeletal defects noted, no edema  SKIN: no suspicious lesions or rashes  NEURO: Normal strength and tone, mentation intact and speech normal  PSYCH: mentation appears normal, affect normal/bright        6/28/2024   Mini Cog   Clock Draw Score 2 Normal   3 Item Recall 3 objects recalled   Mini Cog Total Score 5             "     Signed Electronically by: Lizett Parikh MD

## 2024-06-28 NOTE — LETTER
"July 5, 2024      Nicole Slaughter  [address]  [place] [address]            Nicole,     Here are my comments about your recent results:     Lipids - Your \"bad\" cholesterol was elevated. This can be improved with diet and exercise. All animal based foods such as meat, dairy, and eggs contain cholesterol. All plant based foods such as fruits, nuts, and vegetables do not.     Stay on your atorvastatin medication.     CMP Results - Your blood sugar was 102. Your kidney function, electrolytes, and liver function were normal.        Please call the clinic ([phone]), or message us on [place] with any questions you may have.     Have a great day,     Dr. Drummond                  "

## 2024-07-01 LAB
ALBUMIN SERPL BCG-MCNC: 4.3 G/DL (ref 3.5–5.2)
ALP SERPL-CCNC: 75 U/L (ref 40–150)
ALT SERPL W P-5'-P-CCNC: 20 U/L (ref 0–50)
ANION GAP SERPL CALCULATED.3IONS-SCNC: 12 MMOL/L (ref 7–15)
AST SERPL W P-5'-P-CCNC: 22 U/L (ref 0–45)
BILIRUB SERPL-MCNC: 0.4 MG/DL
BUN SERPL-MCNC: 20 MG/DL (ref 8–23)
CALCIUM SERPL-MCNC: 9.1 MG/DL (ref 8.8–10.2)
CHLORIDE SERPL-SCNC: 102 MMOL/L (ref 98–107)
CHOLEST SERPL-MCNC: 225 MG/DL
CREAT SERPL-MCNC: 0.72 MG/DL (ref 0.51–0.95)
DEPRECATED HCO3 PLAS-SCNC: 27 MMOL/L (ref 22–29)
EGFRCR SERPLBLD CKD-EPI 2021: 86 ML/MIN/1.73M2
FASTING STATUS PATIENT QL REPORTED: NO
FASTING STATUS PATIENT QL REPORTED: NO
GLUCOSE SERPL-MCNC: 102 MG/DL (ref 70–99)
HDLC SERPL-MCNC: 68 MG/DL
LDLC SERPL CALC-MCNC: 130 MG/DL
NONHDLC SERPL-MCNC: 157 MG/DL
POTASSIUM SERPL-SCNC: 3.4 MMOL/L (ref 3.4–5.3)
PROT SERPL-MCNC: 6.7 G/DL (ref 6.4–8.3)
SODIUM SERPL-SCNC: 141 MMOL/L (ref 135–145)
TRIGL SERPL-MCNC: 134 MG/DL

## 2024-07-01 NOTE — RESULT ENCOUNTER NOTE
"Franklyn Rivera,    If you have not viewed these results on [place] within 3 days, we will use an alternative method to contact you. We will contact you via the following protocol:    - Via letter if your results are normal.  - Via phone ([phone]) if your results are abnormal.     Here are my comments about your recent results:    Lipids - Your \"bad\" cholesterol was elevated. This can be improved with diet and exercise. All animal based foods such as meat, dairy, and eggs contain cholesterol. All plant based foods such as fruits, nuts, and vegetables do not.    Stay on your atorvastatin medication.    CMP Results - Your blood sugar was 102. Your kidney function, electrolytes, and liver function were normal.      Please call the clinic ([phone]), or message us on [employer] with any questions you may have.     Have a great day,    Dr. Drummond"

## 2024-08-11 DIAGNOSIS — B00.9 HERPES SIMPLEX VIRUS INFECTION: ICD-10-CM

## 2024-08-12 RX ORDER — ACYCLOVIR 400 MG/1
400 TABLET ORAL 2 TIMES DAILY
Qty: 180 TABLET | Refills: 2 | Status: SHIPPED | OUTPATIENT
Start: 2024-08-12

## 2024-10-03 ENCOUNTER — TRANSFERRED RECORDS (OUTPATIENT)
Dept: HEALTH INFORMATION MANAGEMENT | Facility: CLINIC | Age: 79
End: 2024-10-03
Payer: COMMERCIAL

## 2024-11-08 DIAGNOSIS — G43.009 MIGRAINE WITHOUT AURA AND WITHOUT STATUS MIGRAINOSUS, NOT INTRACTABLE: ICD-10-CM

## 2024-11-08 RX ORDER — SUMATRIPTAN 50 MG/1
TABLET, FILM COATED ORAL
Qty: 18 TABLET | Refills: 3 | Status: SHIPPED | OUTPATIENT
Start: 2024-11-08

## 2024-12-02 ENCOUNTER — MYC MEDICAL ADVICE (OUTPATIENT)
Dept: FAMILY MEDICINE | Facility: CLINIC | Age: 79
End: 2024-12-02
Payer: COMMERCIAL

## 2024-12-02 NOTE — TELEPHONE ENCOUNTER
Patient Quality Outreach    Patient is due for the following:   Depression  -  PHQ-9 needed    Action(s) Taken:   Patient was assigned appropriate questionnaire to complete    Type of outreach:    Sent [employer] message.  MA/VF to call in 1 week if not read/completed. Send letter in 2 weeks if still not read/completed and no return call.    Questions for provider review:    None           Faby Ellsworth, [facility]  Chart routed to Care Team.

## 2024-12-09 NOTE — TELEPHONE ENCOUNTER
Spoke with patient, her  was recently placed into assisted living and she states she's not doing the best, scheduled for a f2f mood recheck. Patient was only open to seeing MD Jl. Encouraged to completed PHQ9.    Future Appointments 12/9/2024 - 6/7/2025        Date Visit Type Length Department Provider     1/17/2025  4:00 PM OFFICE VISIT 30 min  FAMILY PRACTICE Lizett Parikh MD    Location Instructions:     [facility] is located at [address] [address], one mile north of the [address] exit off of [place] [address]. From [address] [facility] [address] [facility], exit to turn west on [address] [place] [address], then turn south on [address].                    Fina Tejada MA

## 2024-12-19 ENCOUNTER — PATIENT OUTREACH (OUTPATIENT)
Dept: CARE COORDINATION | Facility: CLINIC | Age: 79
End: 2024-12-19
Payer: COMMERCIAL

## 2024-12-31 ENCOUNTER — MYC REFILL (OUTPATIENT)
Dept: FAMILY MEDICINE | Facility: CLINIC | Age: 79
End: 2024-12-31
Payer: COMMERCIAL

## 2024-12-31 DIAGNOSIS — M25.561 RIGHT KNEE PAIN, UNSPECIFIED CHRONICITY: ICD-10-CM

## 2024-12-31 RX ORDER — TIZANIDINE 2 MG/1
2 TABLET ORAL 2 TIMES DAILY PRN
Qty: 90 TABLET | Refills: 0 | Status: SHIPPED | OUTPATIENT
Start: 2024-12-31

## 2025-01-20 ENCOUNTER — OFFICE VISIT (OUTPATIENT)
Dept: FAMILY MEDICINE | Facility: CLINIC | Age: 80
End: 2025-01-20
Payer: COMMERCIAL

## 2025-01-20 VITALS
RESPIRATION RATE: 16 BRPM | HEART RATE: 69 BPM | SYSTOLIC BLOOD PRESSURE: 120 MMHG | WEIGHT: 138 LBS | HEIGHT: 62 IN | DIASTOLIC BLOOD PRESSURE: 82 MMHG | BODY MASS INDEX: 25.4 KG/M2 | TEMPERATURE: 97.9 F | OXYGEN SATURATION: 97 %

## 2025-01-20 DIAGNOSIS — F41.1 GAD (GENERALIZED ANXIETY DISORDER): ICD-10-CM

## 2025-01-20 DIAGNOSIS — Z63.79 STRESSFUL LIFE EVENT AFFECTING FAMILY: ICD-10-CM

## 2025-01-20 DIAGNOSIS — F33.1 MODERATE RECURRENT MAJOR DEPRESSION (H): ICD-10-CM

## 2025-01-20 DIAGNOSIS — G47.00 INSOMNIA, UNSPECIFIED TYPE: Primary | ICD-10-CM

## 2025-01-20 PROCEDURE — 96127 BRIEF EMOTIONAL/BEHAV ASSMT: CPT

## 2025-01-20 PROCEDURE — G2211 COMPLEX E/M VISIT ADD ON: HCPCS

## 2025-01-20 PROCEDURE — 99215 OFFICE O/P EST HI 40 MIN: CPT

## 2025-01-20 RX ORDER — POTASSIUM CHLORIDE 1500 MG/1
20 TABLET, EXTENDED RELEASE ORAL
COMMUNITY

## 2025-01-20 RX ORDER — TIZANIDINE 2 MG/1
2 TABLET ORAL 2 TIMES DAILY PRN
Qty: 90 TABLET | Refills: 0 | Status: CANCELLED | OUTPATIENT
Start: 2025-01-20

## 2025-01-20 RX ORDER — TRAZODONE HYDROCHLORIDE 50 MG/1
50 TABLET, FILM COATED ORAL
Qty: 30 TABLET | Refills: 0 | Status: SHIPPED | OUTPATIENT
Start: 2025-01-20

## 2025-01-20 ASSESSMENT — PATIENT HEALTH QUESTIONNAIRE - PHQ9
SUM OF ALL RESPONSES TO PHQ QUESTIONS 1-9: 5
SUM OF ALL RESPONSES TO PHQ QUESTIONS 1-9: 5

## 2025-01-20 ASSESSMENT — PAIN SCALES - GENERAL: PAINLEVEL_OUTOF10: NO PAIN (0)

## 2025-01-20 NOTE — PATIENT INSTRUCTIONS
I do suspect the progression in poor sleep is being affected by your husbands help and current state. Insomnia can be both a results or cause of poorly managed depression and anxiety. You anxiety and depression questionnaires look good today without exacerbation in either. I would like you to start by managing your sleep environment and promoting good sleep hygiene by limiting screen time (TV or cell phone use prior to bed or when in bed), placing a sound machine in the room, and consider taking a warm bath or drinking a warm beverage before bedtime.     I have prescribed an as needed medication called Trazodone to take as needed 1-2 hours before bedtime to help with sleep. Plan to start with 50 mg as needed and we can increase to 100 mg if there is still room for improvement with sleep quality.     I would strongly recommend considering therapy or counseling for both sleep and current stressful life events.

## 2025-01-20 NOTE — PROGRESS NOTES
Assessment & Plan     Insomnia, unspecified type  Patient is a 79 year-old female with hypertension, hyperlipidemia, GERD, and migraine headaches presenting with concerns of insomnia. Suspect current life situation and underlying ASHWIN/MDD are contributing to progression in insomnia. Does not want to take something daily for sleep but rather as needed. Given age and underlying commodities, plan to avoid controlled or overly sedating medications. Opted for PRN bedtime trazodone. Will start with 50 mg as needed before bed and can increase to 100 mg if further sleep improvement is desired. Discussed sleep hygiene practices in addition to pharmacologic therapy. Discussed proper use of medication(s) and potential side effects. Follow-up in 4 weeks via phone call or [email or web address] message. Patient understands and is agreeable to plan as discussed in clinic.  - traZODone (DESYREL) 50 MG tablet; Take 1 tablet (50 mg) by mouth nightly as needed for sleep.    Stressful life event affecting family   currently in nursing home and on hospice. Strongly encouraged counseling/therapy but declines at this time. Advised to reach out if she reconsiders.     Moderate recurrent major depression (H)  ASHWIN (generalized anxiety disorder)  PHQ-9 and ASHWIN-7 completed today and overall improved since prior screening. Patient states that she still has depressive and anxiety symptoms but feels that both are better than earlier this year. Does NOT want to start another medication for management at this time.       I spent a total of 40 minutes on the day of the visit.   Time spent by me today doing chart review, history and exam, documentation and further activities per the note      Subjective   Nicole is a 79 year old, presenting for the following health issues:   Follow Up, Recheck Medication, and Sleep Problem        1/20/2025    10:21 AM   Additional Questions   Roomed by AD   Accompanied by Self     HPI         6/28/2024     2:02 PM  12/16/2024     6:23 PM 1/20/2025    10:10 AM   PHQ   PHQ-9 Total Score 2 11  5    Q9: Thoughts of better off dead/self-harm past 2 weeks Not at all  Not at all Not at all        Patient-reported    Proxy-reported         9/20/2023     1:37 PM 4/9/2024    12:14 PM 1/14/2025    11:42 AM   ASHWIN-7 SCORE   Total Score  19 (severe anxiety) 6 (mild anxiety)   Total Score 1 19 6        Patient-reported     Insomnia  Onset/Duration: Has not been sleeping well over the last year. Her  has been in and out of the hospital and is now on hospice.  Description:   Frequency of insomnia:  nightly  Time to fall asleep (sleep latency): Able to fall asleep  Middle of night awakening:  YES- 330am  Early morning awakening:  YES- 330am  Progression of Symptoms:  worsening  Accompanying Signs & Symptoms:  Daytime sleepiness/napping: No  Excessive snoring/apnea: No  Restless legs: YES  Waking to urinate: YES  Chronic pain:  YES - Back  Depression symptoms (if yes, do PHQ9): YES  Anxiety symptoms (if yes, do ASHWIN-7): YES  History:  Prior Insomnia: Has not always been the best sleeper but has been worse within the last year  New stressful situation: YES  Precipitating factors:   Caffeine intake: YES- A couple of cups of coffee in the morning  OTC decongestants: No  Any new medications: No  Alleviating factors:  Self medicating (alcohol, etc.):  No  Stress-reduction (exercise, yoga, meditation etc): Was walking 3 miles a day until winter hit  Therapies tried and outcome: Advil pm -  Effective but will give her migraines if she takes it too often.     Presents with concerns of progression in insomnia over the past few months. Has been dealing with a lot over the past year in regards to 's health.  has dementia and had been experiencing frequent falls and in and out of the hospital until fall 2024 when he was placed in a nursing home and placed on hospice care. Reports being concerned about 's well-being and what will  "happen to her and her life following his passing.     History of depression and anxiety, feels that both are better since earlier this year. Has been on multiple medications (escitalopram, fluoxetine, duloxetine) but either stopped due to side effects of because no longer desired treatment.     Has used OTC Advil PM for sleep aid, can not take consistently due to increase in migraine headaches. Taking gabapentin BID 3 days a week. Does not notice sleep improvement on days that she is taking gabapentin. States that melatonin makes migraines worse, so avoids.    Denies issues falling asleep but rather staying asleep. Will go to bed between 10-11 pm and wake  up consistently around 3 am and be unable to fall back to sleep. Does report watching TV in bed prior to sleep. Has humidifier in the bedroom for white noise.         Objective    /82 (BP Location: Left arm, Patient Position: Sitting, Cuff Size: Adult Regular)   Pulse 69   Temp 97.9  F (36.6  C) (Temporal)   Resp 16   Ht 1.575 m (5' 2.01\")   Wt 62.6 kg (138 lb)   SpO2 97%   BMI 25.23 kg/m    Body mass index is 25.23 kg/m .  Physical Exam  Vitals reviewed.   Constitutional:       General: She is not in acute distress.     Appearance: Normal appearance. She is not ill-appearing.   Pulmonary:      Effort: Pulmonary effort is normal. No respiratory distress.      Breath sounds: Normal breath sounds.   Skin:     General: Skin is warm and dry.      Capillary Refill: Capillary refill takes less than 2 seconds.      Findings: No lesion or rash.   Neurological:      Mental Status: She is alert.   Psychiatric:         Attention and Perception: Attention and perception normal.         Mood and Affect: Mood and affect normal. Mood is not anxious or depressed. Affect is not flat or tearful.              Signed Electronically by: REYMUNDO De La Torre CNP    "

## 2025-02-16 DIAGNOSIS — G47.00 INSOMNIA, UNSPECIFIED TYPE: ICD-10-CM

## 2025-02-17 RX ORDER — TRAZODONE HYDROCHLORIDE 50 MG/1
50 TABLET ORAL
Qty: 90 TABLET | Refills: 2 | Status: SHIPPED | OUTPATIENT
Start: 2025-02-17

## 2025-03-10 ENCOUNTER — TELEPHONE (OUTPATIENT)
Dept: FAMILY MEDICINE | Facility: CLINIC | Age: 80
End: 2025-03-10
Payer: COMMERCIAL

## 2025-03-10 NOTE — TELEPHONE ENCOUNTER
Patient Quality Outreach    Patient is due for the following:   Depression  -  PHQ-9 needed  Physical Annual Wellness Visit      Topic Date Due    Diptheria Tetanus Pertussis (DTAP/TDAP/TD) Vaccine (1 - Tdap) 07/12/2013    COVID-19 Vaccine (7 - 2024-25 season) 09/01/2024    Zoster (Shingles) Vaccine (3 of 3) 01/01/2025       Action(s) Taken:   Patient has upcoming appointment, these items will be addressed at that time.    Type of outreach:    Chart review performed, no outreach needed.    Questions for provider review:    None           Faby Ellsworth, CMA

## 2025-03-25 ENCOUNTER — TRANSFERRED RECORDS (OUTPATIENT)
Dept: HEALTH INFORMATION MANAGEMENT | Facility: CLINIC | Age: 80
End: 2025-03-25
Payer: COMMERCIAL

## 2025-04-30 ENCOUNTER — MYC MEDICAL ADVICE (OUTPATIENT)
Dept: FAMILY MEDICINE | Facility: CLINIC | Age: 80
End: 2025-04-30
Payer: COMMERCIAL

## 2025-04-30 DIAGNOSIS — K21.9 GASTROESOPHAGEAL REFLUX DISEASE, UNSPECIFIED WHETHER ESOPHAGITIS PRESENT: ICD-10-CM

## 2025-04-30 NOTE — TELEPHONE ENCOUNTER
Clinic RN: Please investigate patient's chart or contact patient if the information cannot be found because patient should have run out of this medication on 10/2023. Confirm patient is taking this medication as prescribed. Document findings and route refill encounter to provider for approval or denial.

## 2025-04-30 NOTE — TELEPHONE ENCOUNTER
Sent Basilio Becerra RN  [facility] - Registered Nurse  Clinic Triage [place]   April 30, 2025

## 2025-05-01 RX ORDER — OMEPRAZOLE 20 MG/1
20 CAPSULE, DELAYED RELEASE ORAL DAILY
Qty: 90 CAPSULE | Refills: 0 | Status: SHIPPED | OUTPATIENT
Start: 2025-05-01

## 2025-05-01 NOTE — TELEPHONE ENCOUNTER
Order was signed by provider for refill.     Clay Guevara RN  [facility] Triage [place]  May 1, 2025

## 2025-05-01 NOTE — TELEPHONE ENCOUNTER
Patient states she is currently taking prilosec      Martha Post MARK  [facility] - Registered Nurse  Clinic Triage [place]   May 1, 2025

## 2025-05-09 ENCOUNTER — ANCILLARY PROCEDURE (OUTPATIENT)
Dept: MAMMOGRAPHY | Facility: CLINIC | Age: 80
End: 2025-05-09
Attending: FAMILY MEDICINE
Payer: COMMERCIAL

## 2025-05-09 DIAGNOSIS — Z12.31 ENCOUNTER FOR SCREENING MAMMOGRAM FOR BREAST CANCER: ICD-10-CM

## 2025-05-09 PROCEDURE — 77063 BREAST TOMOSYNTHESIS BI: CPT | Mod: GC | Performed by: RADIOLOGY

## 2025-05-09 PROCEDURE — 77067 SCR MAMMO BI INCL CAD: CPT | Mod: GC | Performed by: RADIOLOGY

## 2025-06-24 DIAGNOSIS — G43.009 MIGRAINE WITHOUT AURA AND WITHOUT STATUS MIGRAINOSUS, NOT INTRACTABLE: ICD-10-CM

## 2025-06-24 RX ORDER — GABAPENTIN 100 MG/1
100 CAPSULE ORAL 3 TIMES DAILY PRN
Qty: 270 CAPSULE | Refills: 0 | Status: SHIPPED | OUTPATIENT
Start: 2025-06-24

## 2025-07-14 DIAGNOSIS — M25.561 RIGHT KNEE PAIN, UNSPECIFIED CHRONICITY: ICD-10-CM

## 2025-07-15 RX ORDER — TIZANIDINE 2 MG/1
2 TABLET ORAL 2 TIMES DAILY PRN
Qty: 90 TABLET | Refills: 0 | Status: SHIPPED | OUTPATIENT
Start: 2025-07-15

## 2025-07-24 SDOH — HEALTH STABILITY: PHYSICAL HEALTH: ON AVERAGE, HOW MANY DAYS PER WEEK DO YOU ENGAGE IN MODERATE TO STRENUOUS EXERCISE (LIKE A BRISK WALK)?: 7 DAYS

## 2025-07-24 SDOH — HEALTH STABILITY: PHYSICAL HEALTH: ON AVERAGE, HOW MANY MINUTES DO YOU ENGAGE IN EXERCISE AT THIS LEVEL?: 90 MIN

## 2025-07-24 ASSESSMENT — SOCIAL DETERMINANTS OF HEALTH (SDOH): HOW OFTEN DO YOU GET TOGETHER WITH FRIENDS OR RELATIVES?: PATIENT DECLINED

## 2025-07-29 ENCOUNTER — OFFICE VISIT (OUTPATIENT)
Dept: FAMILY MEDICINE | Facility: CLINIC | Age: 80
End: 2025-07-29
Attending: FAMILY MEDICINE
Payer: COMMERCIAL

## 2025-07-29 VITALS
BODY MASS INDEX: 26.31 KG/M2 | RESPIRATION RATE: 16 BRPM | WEIGHT: 143 LBS | TEMPERATURE: 98 F | SYSTOLIC BLOOD PRESSURE: 126 MMHG | HEIGHT: 62 IN | HEART RATE: 80 BPM | DIASTOLIC BLOOD PRESSURE: 70 MMHG | OXYGEN SATURATION: 98 %

## 2025-07-29 DIAGNOSIS — Z79.899 ENCOUNTER FOR LONG-TERM (CURRENT) USE OF MEDICATIONS: ICD-10-CM

## 2025-07-29 DIAGNOSIS — I10 HYPERTENSION GOAL BP (BLOOD PRESSURE) < 140/90: ICD-10-CM

## 2025-07-29 DIAGNOSIS — E78.5 HYPERLIPIDEMIA LDL GOAL <130: ICD-10-CM

## 2025-07-29 DIAGNOSIS — Z13.1 SCREENING FOR DIABETES MELLITUS: ICD-10-CM

## 2025-07-29 DIAGNOSIS — M85.831 OSTEOPENIA OF BOTH FOREARMS: ICD-10-CM

## 2025-07-29 DIAGNOSIS — Z23 NEED FOR VACCINATION: ICD-10-CM

## 2025-07-29 DIAGNOSIS — Z00.00 ENCOUNTER FOR MEDICARE ANNUAL WELLNESS EXAM: Primary | ICD-10-CM

## 2025-07-29 DIAGNOSIS — R20.0 NUMBNESS IN FEET: ICD-10-CM

## 2025-07-29 DIAGNOSIS — R10.32 LLQ ABDOMINAL PAIN: ICD-10-CM

## 2025-07-29 DIAGNOSIS — E04.1 THYROID NODULE: ICD-10-CM

## 2025-07-29 DIAGNOSIS — M85.832 OSTEOPENIA OF BOTH FOREARMS: ICD-10-CM

## 2025-07-29 DIAGNOSIS — H61.23 BILATERAL IMPACTED CERUMEN: ICD-10-CM

## 2025-07-29 LAB
ALBUMIN SERPL BCG-MCNC: 4.3 G/DL (ref 3.5–5.2)
ALP SERPL-CCNC: 80 U/L (ref 40–150)
ALT SERPL W P-5'-P-CCNC: 20 U/L (ref 0–50)
ANION GAP SERPL CALCULATED.3IONS-SCNC: 10 MMOL/L (ref 7–15)
AST SERPL W P-5'-P-CCNC: 26 U/L (ref 0–45)
BASOPHILS # BLD AUTO: 0.1 10E3/UL (ref 0–0.2)
BASOPHILS NFR BLD AUTO: 1 %
BILIRUB SERPL-MCNC: 0.4 MG/DL
BUN SERPL-MCNC: 19.4 MG/DL (ref 8–23)
CALCIUM SERPL-MCNC: 9.5 MG/DL (ref 8.8–10.4)
CHLORIDE SERPL-SCNC: 102 MMOL/L (ref 98–107)
CHOLEST SERPL-MCNC: 211 MG/DL
CREAT SERPL-MCNC: 0.84 MG/DL (ref 0.51–0.95)
EGFRCR SERPLBLD CKD-EPI 2021: 71 ML/MIN/1.73M2
EOSINOPHIL # BLD AUTO: 0.2 10E3/UL (ref 0–0.7)
EOSINOPHIL NFR BLD AUTO: 2 %
ERYTHROCYTE [DISTWIDTH] IN BLOOD BY AUTOMATED COUNT: 14.8 % (ref 10–15)
EST. AVERAGE GLUCOSE BLD GHB EST-MCNC: 111 MG/DL
FASTING STATUS PATIENT QL REPORTED: NO
FASTING STATUS PATIENT QL REPORTED: NO
GLUCOSE SERPL-MCNC: 93 MG/DL (ref 70–99)
HBA1C MFR BLD: 5.5 % (ref 0–5.6)
HCO3 SERPL-SCNC: 26 MMOL/L (ref 22–29)
HCT VFR BLD AUTO: 35.8 % (ref 35–47)
HDLC SERPL-MCNC: 87 MG/DL
HGB BLD-MCNC: 11.7 G/DL (ref 11.7–15.7)
IMM GRANULOCYTES # BLD: 0 10E3/UL
IMM GRANULOCYTES NFR BLD: 0 %
LDLC SERPL CALC-MCNC: 110 MG/DL
LYMPHOCYTES # BLD AUTO: 1.4 10E3/UL (ref 0.8–5.3)
LYMPHOCYTES NFR BLD AUTO: 19 %
MCH RBC QN AUTO: 28.1 PG (ref 26.5–33)
MCHC RBC AUTO-ENTMCNC: 32.7 G/DL (ref 31.5–36.5)
MCV RBC AUTO: 86 FL (ref 78–100)
MONOCYTES # BLD AUTO: 0.7 10E3/UL (ref 0–1.3)
MONOCYTES NFR BLD AUTO: 10 %
NEUTROPHILS # BLD AUTO: 5 10E3/UL (ref 1.6–8.3)
NEUTROPHILS NFR BLD AUTO: 69 %
NONHDLC SERPL-MCNC: 124 MG/DL
PLATELET # BLD AUTO: 262 10E3/UL (ref 150–450)
POTASSIUM SERPL-SCNC: 4.1 MMOL/L (ref 3.4–5.3)
PROT SERPL-MCNC: 7.1 G/DL (ref 6.4–8.3)
RBC # BLD AUTO: 4.16 10E6/UL (ref 3.8–5.2)
SODIUM SERPL-SCNC: 138 MMOL/L (ref 135–145)
TRIGL SERPL-MCNC: 68 MG/DL
VIT B12 SERPL-MCNC: 922 PG/ML (ref 232–1245)
WBC # BLD AUTO: 7.3 10E3/UL (ref 4–11)

## 2025-07-29 PROCEDURE — 83036 HEMOGLOBIN GLYCOSYLATED A1C: CPT | Performed by: FAMILY MEDICINE

## 2025-07-29 PROCEDURE — 82607 VITAMIN B-12: CPT | Performed by: FAMILY MEDICINE

## 2025-07-29 PROCEDURE — G0439 PPPS, SUBSEQ VISIT: HCPCS | Performed by: FAMILY MEDICINE

## 2025-07-29 PROCEDURE — 80061 LIPID PANEL: CPT | Performed by: FAMILY MEDICINE

## 2025-07-29 PROCEDURE — 3044F HG A1C LEVEL LT 7.0%: CPT | Performed by: FAMILY MEDICINE

## 2025-07-29 PROCEDURE — 80053 COMPREHEN METABOLIC PANEL: CPT | Performed by: FAMILY MEDICINE

## 2025-07-29 PROCEDURE — 36415 COLL VENOUS BLD VENIPUNCTURE: CPT | Performed by: FAMILY MEDICINE

## 2025-07-29 PROCEDURE — 85025 COMPLETE CBC W/AUTO DIFF WBC: CPT | Performed by: FAMILY MEDICINE

## 2025-07-29 PROCEDURE — 3074F SYST BP LT 130 MM HG: CPT | Performed by: FAMILY MEDICINE

## 2025-07-29 PROCEDURE — 3078F DIAST BP <80 MM HG: CPT | Performed by: FAMILY MEDICINE

## 2025-07-29 PROCEDURE — 99214 OFFICE O/P EST MOD 30 MIN: CPT | Mod: 25 | Performed by: FAMILY MEDICINE

## 2025-07-29 PROCEDURE — G2211 COMPLEX E/M VISIT ADD ON: HCPCS | Performed by: FAMILY MEDICINE

## 2025-07-29 PROCEDURE — 1126F AMNT PAIN NOTED NONE PRSNT: CPT | Performed by: FAMILY MEDICINE

## 2025-07-29 ASSESSMENT — PAIN SCALES - GENERAL: PAINLEVEL_OUTOF10: NO PAIN (0)

## 2025-07-29 NOTE — PATIENT INSTRUCTIONS
"Patient Education   Preventive Care Advice   This is general advice we often give to help people stay healthy. Your care team may have specific advice just for you. Please talk to your care team about your own preventive care needs.  Lifestyle  Exercise at least 150 minutes each week (30 minutes a day, 5 days a week).  Do muscle strengthening activities 2 days a week. These help control your weight and prevent disease.  No smoking.  Wear sunscreen to prevent skin cancer.  Take time with family and friends.  Have your home tested for radon every 2 to 5 years. Radon is a colorless, odorless gas that can harm your lungs. To learn more, go to www.health.[facility].mn.us and search for \"Radon in Homes.\"  Keep guns unloaded and locked up in a safe place like a safe or gun vault, or, use a gun lock and hide the keys. Always lock away bullets separately. To learn more, visit [employer].mn.gov and search for \"safe gun storage.\"  Nutrition  Eat 5 or more servings of fruits and vegetables each day.  Try wheat bread, brown rice and whole grain pasta (instead of white bread, rice, and pasta).  Get enough calcium and vitamin D. Check the label on foods and aim for 100% of the RDA (recommended daily allowance).  Regular exams  Have a dental exam and cleaning every 6 months.  Older adults: Ask your care team how often to have memory testing.  See your health care team every year to talk about:  Any changes in your health.  Any medicines your care team has prescribed.  Preventive care, family planning, and ways to prevent chronic diseases.  Shots (vaccines)   HPV shots (up to age 26), if you've never had them before.  Hepatitis B shots (up to age 59), if you've never had them before.  COVID-19 shot: Get this shot when it's due.  Flu shot: Get a flu shot every year.  Tetanus shot: Get a tetanus shot every 10 years.  Pneumococcal, hepatitis A, and RSV shots: Ask your care team if you need these based on your risk.  Shingles shot (for age 50 and " up).  General health tests  Diabetes screening:  Starting at age 35, Get screened for diabetes at least every 3 years.  If you are younger than age 35, ask your care team if you should be screened for diabetes.  Cholesterol test: At age 39, start having a cholesterol test every 5 years, or more often if advised.  Bone density scan (DEXA): At age 50, ask your care team if you should have this scan for osteoporosis (brittle bones).  Hepatitis C: Get tested at least once in your life.  Abdominal aortic aneurysm screening: Talk to your doctor about having this screening if you:  Have ever smoked; and  Are biologically male; and  Are between the ages of 65 and 75.  STIs (sexually transmitted infections)  Before age 24: Ask your care team if you should be screened for STIs.  After age 24: Get screened for STIs if you're at risk. You are at risk for STIs (including HIV) if:  You are sexually active with more than one person.  You don't use condoms every time.  You or a partner was diagnosed with a sexually transmitted infection.  If you are at risk for HIV, ask about PrEP medicine to prevent HIV.  Get tested for HIV at least once in your life, whether you are at risk for HIV or not.  Cancer screening tests  Cervical cancer screening: If you have a cervix, begin getting regular cervical cancer screening tests at age 21. Most people who have regular screenings with normal results can stop after age 65. Talk about this with your provider.  Breast cancer scan (mammogram): If you've ever had breasts, begin having regular mammograms starting at age 40. This is a scan to check for breast cancer.  Colon cancer screening: It is important to start screening for colon cancer at age 45.  Have a colonoscopy test every 10 years (or more often if you're at risk) Or, ask your provider about stool tests like a FIT test every year or Cologuard test every 3 years.  To learn more about your testing options, visit:  [email or web address].  For help making a decision, visit: justin.[email or web address]/[email or web address].  Prostate cancer screening test: If you have a prostate and are age 55 to 69, ask your provider if you would benefit from a yearly prostate cancer screening test.  Lung cancer screening: If you are a current or former smoker age 50 to 80, ask your care team if ongoing lung cancer screenings are right for you.    For informational purposes only. Not to replace the advice of your health care provider. Copyright   2023 [employer]. All rights reserved. Clinically reviewed by the  [employer] [facility]. [employer] 817174 - REV 6/25.  Preventing Falls: Care Instructions  Injuries and health problems such as trouble walking or poor eyesight can increase your risk of falling. So can some medicines. But there are things you can do to help prevent falls. You can exercise to get stronger. You can also arrange your home to make it safer.    Talk to your doctor about the medicines you take. Ask if any of them increase the risk of falls and whether they can be changed or stopped.   Try to exercise regularly. It can help improve your strength and balance. This can help lower your risk of falling.         Practice fall safety and prevention.   Wear low-heeled shoes that fit well and give your feet good support. Talk to your doctor if you have foot problems that make this hard.  Carry a cellphone or wear a medical alert device that you can use to call for help.  Use stepladders instead of chairs to reach high objects. Don't climb if you're at risk for falls. Ask for help, if needed.  Wear the correct eyeglasses, if you need them.        Make your home safer.   Remove rugs, cords, clutter, and furniture from walkways.  Keep your house well lit. Use night-lights in hallways and bathrooms.  Install and use sturdy handrails on stairways.  Wear nonskid footwear, even inside. Don't walk barefoot or in socks without shoes.        Be  "safe outside.   Use handrails, curb cuts, and ramps whenever possible.  Keep your hands free by using a shoulder bag or backpack.  Try to walk in well-lit areas. Watch out for uneven ground, changes in pavement, and debris.  Be careful in the winter. Walk on the grass or gravel when sidewalks are slippery. Use de-icer on steps and walkways. Add non-slip devices to shoes.    Put grab bars and nonskid mats in your shower or tub and near the toilet. Try to use a shower chair or bath bench when bathing.   Get into a tub or shower by putting in your weaker leg first. Get out with your strong side first. Have a phone or medical alert device in the bathroom with you.   Where can you learn more?  Go to https://www.[employer]"/patiented  Enter G117 in the search box to learn more about \"Preventing Falls: Care Instructions.\"  Current as of: July 31, 2024  Content Version: 14.5    1582-5546 [employer].   Care instructions adapted under license by your healthcare professional. If you have questions about a medical condition or this instruction, always ask your healthcare professional. [employer] disclaims any warranty or liability for your use of this information.    Hearing Loss: Care Instructions  Overview     Hearing loss is a sudden or slow decrease in how well you hear. It can range from slight to profound. Permanent hearing loss can occur with aging. It also can happen when you are exposed long-term to loud noise. Examples include listening to loud music, riding motorcycles, or being around other loud machines.  Hearing loss can affect your work and home life. It can make you feel lonely or depressed. You may feel that you have lost your independence. But hearing aids and other devices can help you hear better and feel connected to others.  Follow-up care is a key part of your treatment and safety. Be sure to make and go to all appointments, and call your doctor if you are having problems. It's " also a good idea to know your test results and keep a list of the medicines you take.  How can you care for yourself at home?  Avoid loud noises whenever possible. This helps keep your hearing from getting worse.  Always wear hearing protection around loud noises.  Wear a hearing aid as directed.  A professional can help you pick a hearing aid that will work best for you.  You can also get hearing aids over the counter for mild to moderate hearing loss.  Have hearing tests as your doctor suggests. They can show whether your hearing has changed. Your hearing aid may need to be adjusted.  Use other devices as needed. These may include:  Telephone amplifiers and hearing aids that can connect to a television, stereo, radio, or microphone.  Devices that use lights or vibrations. These alert you to the doorbell, a ringing telephone, or a baby monitor.  Television closed-captioning. This shows the words at the bottom of the screen. Most new TVs can do this.  TTY (text telephone). This lets you type messages back and forth on the telephone instead of talking or listening. These devices are also called TDD. When messages are typed on the keyboard, they are sent over the phone line to a receiving TTY. The message is shown on a monitor.  Use text messaging, social media, and email if it is hard for you to communicate by telephone.  Try to learn a listening technique called speechreading. It is not lipreading. You pay attention to people's gestures, expressions, posture, and tone of voice. These clues can help you understand what a person is saying. Face the person you are talking to, and have them face you. Make sure the lighting is good. You need to see the other person's face clearly.  Think about counseling if you need help to adjust to your hearing loss.  When should you call for help?  Watch closely for changes in your health, and be sure to contact your doctor if:    You think your hearing is getting worse.     You have  "new symptoms, such as dizziness or nausea.   Where can you learn more?  Go to https://www.[employer].net/patiented  Enter R798 in the search box to learn more about \"Hearing Loss: Care Instructions.\"  Current as of: October 27, 2024  Content Version: 14.5 2024-2025 [employer].   Care instructions adapted under license by your healthcare professional. If you have questions about a medical condition or this instruction, always ask your healthcare professional. [employer] disclaims any warranty or liability for your use of this information.    Learning About Stress  What is stress?     Stress is your body's response to a hard situation. Your body can have a physical, emotional, or mental response. Stress is a fact of life for most people, and it affects everyone differently. What causes stress for you may not be stressful for someone else.  A lot of things can cause stress. You may feel stress when you go on a job interview, take a test, or run a race. This kind of short-term stress is normal and even useful. It can help you if you need to work hard or react quickly. For example, stress can help you finish an important job on time.  Long-term stress is caused by ongoing stressful situations or events. Examples of long-term stress include long-term health problems, ongoing problems at work, or conflicts in your family. Long-term stress can harm your health.  How does stress affect your health?  When you are stressed, your body responds as though you are in danger. It makes hormones that speed up your heart, make you breathe faster, and give you a burst of energy. This is called the fight-or-flight stress response. If the stress is over quickly, your body goes back to normal and no harm is done.  But if stress happens too often or lasts too long, it can have bad effects. Long-term stress can make you more likely to get sick, and it can make symptoms of some diseases worse. If you tense up when " you are stressed, you may develop neck, shoulder, or low back pain. Stress is linked to high blood pressure and heart disease.  Stress also harms your emotional health. It can make you beyer, tense, or depressed. Your relationships may suffer, and you may not do well at work or school.  What can you do to manage stress?  You can try these things to help manage stress:   Do something active. Exercise or activity can help reduce stress. Walking is a great way to get started. Even everyday activities such as housecleaning or yard work can help.  Try yoga or annamaria chi. These techniques combine exercise and meditation. You may need some training at first to learn them.  Do something you enjoy. For example, listen to music or go to a movie. Practice your hobby or do volunteer work.  Meditate. This can help you relax, because you are not worrying about what happened before or what may happen in the future.  Do guided imagery. Imagine yourself in any setting that helps you feel calm. You can use online videos, books, or a teacher to guide you.  Do breathing exercises. For example:  From a standing position, bend forward from the waist with your knees slightly bent. Let your arms dangle close to the floor.  Breathe in slowly and deeply as you return to a standing position. Roll up slowly and lift your head last.  Hold your breath for just a few seconds in the standing position.  Breathe out slowly and bend forward from the waist.  Let your feelings out. Talk, laugh, cry, and express anger when you need to. Talking with supportive friends or family, a counselor, or a helen leader about your feelings is a healthy way to relieve stress. Avoid discussing your feelings with people who make you feel worse.  Write. It may help to write about things that are bothering you. This helps you find out how much stress you feel and what is causing it. When you know this, you can find better ways to cope.  What can you do to prevent  "stress?  You might try some of these things to help prevent stress:  Manage your time. This helps you find time to do the things you want and need to do.  Get enough sleep. Your body recovers from the stresses of the day while you are sleeping.  Get support. Your family, friends, and community can make a difference in how you experience stress.  Limit your news feed. Avoid or limit time on social media or news that may make you feel stressed.  Do something active. Exercise or activity can help reduce stress. Walking is a great way to get started.  Where can you learn more?  Go to https://www.[employer].net/patiented  Enter N032 in the search box to learn more about \"Learning About Stress.\"  Current as of: October 24, 2024  Content Version: 14.5 2024-2025 [employer].   Care instructions adapted under license by your healthcare professional. If you have questions about a medical condition or this instruction, always ask your healthcare professional. [employer] disclaims any warranty or liability for your use of this information.    Learning About Sleeping Well  What does sleeping well mean?     Sleeping well means getting enough sleep to feel good and stay healthy. How much sleep is enough varies among people.  The number of hours you sleep and how you feel when you wake up are both important. If you do not feel refreshed, you probably need more sleep. Another sign of not getting enough sleep is feeling tired during the day.  Experts recommend that adults get at least 7 or more hours of sleep per day. Children and older adults need more sleep.  Why is getting enough sleep important?  Getting enough quality sleep is a basic part of good health. When your sleep suffers, your physical health, mood, and your thoughts can suffer too. You may find yourself feeling more grumpy or stressed. Not getting enough sleep also can lead to serious problems, including injury, accidents, anxiety, and " "depression.  What might cause poor sleeping?  Many things can cause sleep problems, including:  Changes to your sleep schedule.  Stress. Stress can be caused by fear about a single event, such as giving a speech. Or you may have ongoing stress, such as worry about work or school.  Depression, anxiety, and other mental or emotional conditions.  Changes in your sleep habits or surroundings. This includes changes that happen where you sleep, such as noise, light, or sleeping in a different bed. It also includes changes in your sleep pattern, such as having jet lag or working a late shift.  Health problems, such as pain, breathing problems, and restless legs syndrome.  Lack of regular exercise.  Using alcohol, nicotine, or caffeine before bed.  How can you help yourself?  Here are some tips that may help you sleep more soundly and wake up feeling more refreshed.  Your sleeping area   Use your bedroom only for sleeping and sex. A bit of light reading may help you fall asleep. But if it doesn't, do your reading elsewhere in the house. Try not to use your TV, computer, smartphone, or tablet while you are in bed.  Be sure your bed is big enough to stretch out comfortably, especially if you have a sleep partner.  Keep your bedroom quiet, dark, and cool. Use curtains, blinds, or a sleep mask to block out light. To block out noise, use earplugs, soothing music, or a \"white noise\" machine.  Your evening and bedtime routine   Create a relaxing bedtime routine. You might want to take a warm shower or bath, or listen to soothing music.  Go to bed at the same time every night. And get up at the same time every morning, even if you feel tired.  What to avoid   Limit caffeine (coffee, tea, caffeinated sodas) during the day, and don't have any for at least 6 hours before bedtime.  Avoid drinking alcohol before bedtime. Alcohol can cause you to wake up more often during the night.  Try not to smoke or use tobacco, especially in the " "evening. Nicotine can keep you awake.  Limit naps during the day, especially close to bedtime.  Avoid lying in bed awake for too long. If you can't fall asleep or if you wake up in the middle of the night and can't get back to sleep within about 20 minutes, get out of bed and go to another room until you feel sleepy.  Avoid taking medicine right before bed that may keep you awake or make you feel hyper or energized. Your doctor can tell you if your medicine may do this and if you can take it earlier in the day.  If you can't sleep   Imagine yourself in a peaceful, pleasant scene. Focus on the details and feelings of being in a place that is relaxing.  Get up and do a quiet or boring activity until you feel sleepy.  Avoid drinking any liquids before going to bed to help prevent waking up often to use the bathroom.  Where can you learn more?  Go to https://www.[employer]/patiented  Enter J942 in the search box to learn more about \"Learning About Sleeping Well.\"  Current as of: July 31, 2024  Content Version: 14.5    7259-3273 [employer].   Care instructions adapted under license by your healthcare professional. If you have questions about a medical condition or this instruction, always ask your healthcare professional. [employer] disclaims any warranty or liability for your use of this information.    Bladder Training: Care Instructions  Your Care Instructions     Bladder training is used to treat urge incontinence and stress incontinence. Urge incontinence means that the need to urinate comes on so fast that you can't get to a toilet in time. Stress incontinence means that you leak urine because of pressure on your bladder. For example, it may happen when you laugh, cough, or lift something heavy.  Bladder training can increase how long you can wait before you have to urinate. It can also help your bladder hold more urine. And it can give you better control over the urge to urinate.  It is " important to remember that bladder training takes a few weeks to a few months to make a difference. You may not see results right away, but don't give up.  Follow-up care is a key part of your treatment and safety. Be sure to make and go to all appointments, and call your doctor if you are having problems. It's also a good idea to know your test results and keep a list of the medicines you take.  How can you care for yourself at home?  Work with your doctor to come up with a bladder training program that is right for you. You may use one or more of the following methods.  Delayed urination  In the beginning, try to keep from urinating for 5 minutes after you first feel the need to go.  While you wait, take deep, slow breaths to relax. Kegel exercises can also help you delay the need to go to the bathroom.  After some practice, when you can easily wait 5 minutes to urinate, try to wait 10 minutes before you urinate.  Slowly increase the waiting period until you are able to control when you have to urinate.  Scheduled urination  Empty your bladder when you first wake up in the morning.  Schedule times throughout the day when you will urinate.  Start by going to the bathroom every hour, even if you don't need to go.  Slowly increase the time between trips to the bathroom.  When you have found a schedule that works well for you, keep doing it.  If you wake up during the night and have to urinate, do it. Apply your schedule to waking hours only.  Kegel exercises  These tighten and strengthen pelvic muscles, which can help you control the flow of urine. (If doing these exercises causes pain, stop doing them and talk with your doctor.) To do Kegel exercises:  Squeeze your muscles as if you were trying not to pass gas. Or squeeze your muscles as if you were stopping the flow of urine. Your belly, legs, and buttocks shouldn't move.  Hold the squeeze for 3 seconds, then relax for 5 to 10 seconds.  Start with 3 seconds, then  "add 1 second each week until you are able to squeeze for 10 seconds.  Repeat the exercise 10 times a session. Do 3 to 8 sessions a day.  When should you call for help?  Watch closely for changes in your health, and be sure to contact your doctor if:    Your incontinence is getting worse.     You do not get better as expected.   Where can you learn more?  Go to https://www.[employer].net/patiented  Enter V684 in the search box to learn more about \"Bladder Training: Care Instructions.\"  Current as of: April 30, 2024  Content Version: 14.5 2024-2025 [employer].   Care instructions adapted under license by your healthcare professional. If you have questions about a medical condition or this instruction, always ask your healthcare professional. [employer] disclaims any warranty or liability for your use of this information.       "

## 2025-07-29 NOTE — PROGRESS NOTES
Preventive Care Visit  [facility] BISHNU Parikh MD, Family Medicine  Jul 29, 2025      Assessment & Plan     ASSESSMENT/ORDERS:    ICD-10-CM    1. Encounter for Medicare annual wellness exam  Z00.00       2. Osteopenia of both forearms  M85.831 DEXA HIP/PELVIS/SPINE - Future    M85.832 OFFICE/OUTPT VISIT,EST,LEVL IV      3. Hypertension goal BP (blood pressure) < 140/90  I10 Comprehensive metabolic panel (BMP + Alb, Alk Phos, ALT, AST, Total. Bili, TP)     Comprehensive metabolic panel (BMP + Alb, Alk Phos, ALT, AST, Total. Bili, TP)     OFFICE/OUTPT VISIT,EST,LEVL IV      4. Hyperlipidemia LDL goal <130  E78.5 Lipid panel reflex to direct LDL Non-fasting     Lipid panel reflex to direct LDL Non-fasting     OFFICE/OUTPT VISIT,EST,LEVL IV      5. Numbness in feet  R20.0 Vitamin B12     Vitamin B12     OFFICE/OUTPT VISIT,EST,LEVL IV      6. Thyroid nodule  E04.1 US Thyroid     OFFICE/OUTPT VISIT,EST,LEVL IV      7. Bilateral impacted cerumen  H61.23 OFFICE/OUTPT VISIT,EST,LEVL IV      8. LLQ abdominal pain  R10.32 CBC with platelets and differential     OFFICE/OUTPT VISIT,EST,LEVL IV     CBC with platelets and differential      9. Encounter for long-term (current) use of medications  Z79.899 Comprehensive metabolic panel (BMP + Alb, Alk Phos, ALT, AST, Total. Bili, TP)     Comprehensive metabolic panel (BMP + Alb, Alk Phos, ALT, AST, Total. Bili, TP)      10. Need for vaccination  Z23       11. Screening for diabetes mellitus  Z13.1 Hemoglobin A1c     Hemoglobin A1c          Assessment & Plan  Annual wellness, medicare  See counseling messages   Discussed immunizations. To be done at the pharmacy    Osteopenia of both forearms:  Order bone density testing for follow up  Discuss weight bearing exercises.   Calcium/vitamin D    Hypertension goal BP < 140/90:  Blood pressure appears well-controlled with current medication.  Continue current medication, chlorthalidone.  Recheck one year.  "    Numbness in feet:  Possible causes include low vitamin B12, high blood sugars, or electrolyte imbalance.  Could be related to prior surgery though only had on right side.   Check vitamin B12 level, A1c for blood sugars, and electrolytes including sodium and potassium.  Reach out for worsening or development of weakness.     Thyroid nodule:  Last checked in 2020. She has not noted any difficulties.   Order ultrasound to recheck thyroid nodule.    LLQ abdominal pain  Patient describes as mild ache that comes and goes.   Not related to bowel movements.   Has looser stools. No constipation.   No nausea/vomiting/urinary concerns.   Check cbc and cmp. If normal, will monitor for changes. Reach out for any worsening.   If abnormalities on labs, consider imaging.     Screening for diabetes mellitus:  Perform A1c test to assess blood sugar levels over the last 3 months.    Need for vaccination:  Recommend second Shingrix shot and tetanus vaccine at the pharmacy.    Bilateral impacted cerumen:  Irrigate ears to remove impacted cerumen.    The longitudinal plan of care for the diagnosis(es)/condition(s) as documented were addressed during this visit. Due to the added complexity in care, I will continue to support Nicole in the subsequent management and with ongoing continuity of care.        BMI  Estimated body mass index is 26.15 kg/m  as calculated from the following:    Height as of this encounter: 1.575 m (5' 2.01\").    Weight as of this encounter: 64.9 kg (143 lb).       Counseling  Appropriate preventive services were addressed with this patient via screening, questionnaire, or discussion as appropriate for fall prevention, nutrition, physical activity, Tobacco-use cessation, social engagement, weight loss and cognition.  Checklist reviewing preventive services available has been given to the patient.  Reviewed patient's diet, addressing concerns and/or questions.   She is at risk for psychosocial distress and has " "been provided with information to reduce risk.   Discussed possible causes of fatigue. The patient was provided with written information regarding signs of hearing loss.   Information on urinary incontinence and treatment options given to patient.           Thom Rivera is a 80 year old, presenting for the following:  Medicare Visit        7/29/2025     2:20 PM   Additional Questions   Roomed by BT   Accompanied by self          HPI  Nicole Slaughter, a 80-year-old female, reported that her  passed away in January 2025 after five years of dementia, with his final months complicated by falls, hospitalization, nursing home placement, pneumonia, and Norovirus infection. She described significant emotional difficulty and physical decline following his death, stating that she \"fell apart\" after holding things together during his illness. She has experienced numbness at the bottom of her right foot since undergoing right knee replacement surgery in 2022. The numbness later began to affect the left foot as well. She described the sensation as \"weird,\" feeling numb until touched, but denied tingling or a sensation of the foot being asleep. She also reported previous severe pain in her arch that made walking difficult; this pain improved with use of shoes with arch support but did not affect the numbness. She has not had surgery on the left knee but is considering a gel injection due to decreased efficacy of steroid injections for pain relief.     She takes gabapentin for migraines, which she stated are currently well controlled, despite ongoing sleep difficulties that began after her 's death.     She reported waking up with severe leg and foot cramps and takes potassium and calcium supplements for these symptoms.     She noted intermittent pain in her lower abdomen that wraps around to her back, which occurs off and on without clear relation to bowel movements or constipation; she denied associated " nausea, vomiting, fever, or worsening severity. She stated that she has not had any recent vaccines. She reported a family history of breast lumps in her mother and aunts, which caused anxiety about her own mammogram, but her most recent mammogram was normal.     She also mentioned leg cramps related to electrolyte supplements and noted taking chlorthalidone for blood pressure and cholesterol medication without issues.        Advance Care Planning    Discussed advance care planning with patient; however, patient declined at this time.        7/24/2025   General Health   How would you rate your overall physical health? (!) FAIR   Feel stress (tense, anxious, or unable to sleep) To some extent   (!) STRESS CONCERN      7/24/2025   Nutrition   Diet: Regular (no restrictions)         7/24/2025   Exercise   Days per week of moderate/strenous exercise 7 days   Average minutes spent exercising at this level 90 min         7/24/2025   Social Factors   Frequency of gathering with friends or relatives Patient declined   Worry food won't last until get money to buy more No   Food not last or not have enough money for food? No   Do you have housing? (Housing is defined as stable permanent housing and does not include staying outside in a car, in a tent, in an abandoned building, in an overnight shelter, or couch-surfing.) Yes   Are you worried about losing your housing? No   Lack of transportation? No   Unable to get utilities (heat,electricity)? No         7/29/2025   Fall Risk   Reason Gait Speed Test Not Completed Patient declines          7/24/2025   Activities of Daily Living- Home Safety   Needs help with the following daily activites None of the above   Safety concerns in the home None of the above         7/24/2025   Dental   Dentist two times every year? Yes         7/24/2025   Hearing Screening   Hearing concerns? (!) I NEED TO ASK PEOPLE TO SPEAK UP OR REPEAT THEMSELVES.    (!) TROUBLE UNDERSTANDING SOFT OR WHISPERED  SPEECH.   Would you like a referral for hearing testing? No       Multiple values from one day are sorted in reverse-chronological order         7/24/2025   Driving Risk Screening   Patient/family members have concerns about driving No         7/24/2025   General Alertness/Fatigue Screening   Have you been more tired than usual lately? (!) YES         7/24/2025   Urinary Incontinence Screening   Bothered by leaking urine in past 6 months Yes         Today's PHQ-9 Score:       7/30/2025     9:35 AM   PHQ-9 SCORE   PHQ-9 Total Score 8           7/24/2025   Substance Use   Alcohol more than 3/day or more than 7/wk No   Do you have a current opioid prescription? No   How severe/bad is pain from 1 to 10? 4/10   Do you use any other substances recreationally? No     Social History     Tobacco Use    Smoking status: Never    Smokeless tobacco: Never   Vaping Use    Vaping status: Never Used   Substance Use Topics    Alcohol use: Not Currently     Comment: 5 drinks per week / wine     Drug use: Never           5/9/2025   LAST FHS-7 RESULTS   1st degree relative breast or ovarian cancer No   Any relative bilateral breast cancer No   Any male have breast cancer No   Any ONE woman have BOTH breast AND ovarian cancer No   Any woman with breast cancer before 50yrs No   2 or more relatives with breast AND/OR ovarian cancer No   2 or more relatives with breast AND/OR bowel cancer No        Mammogram Screening - After age 74- determine frequency with patient based on health status, life expectancy and patient goals    ASCVD Risk   The 10-year ASCVD risk score (Nina CARO, et al., 2019) is: 29%    Values used to calculate the score:      Age: 80 years      Sex: Female      Is Non- : No      Diabetic: No      Tobacco smoker: No      Systolic Blood Pressure: 126 mmHg      Is BP treated: Yes      HDL Cholesterol: 87 mg/dL      Total Cholesterol: 211 mg/dL              Reviewed and updated as needed this  "visit by Provider   Tobacco  Allergies  Meds  Problems  Med Hx  Surg Hx  Fam Hx            BP Readings from Last 3 Encounters:   07/29/25 126/70   01/20/25 120/82   06/28/24 136/64    Wt Readings from Last 3 Encounters:   07/29/25 64.9 kg (143 lb)   01/20/25 62.6 kg (138 lb)   06/28/24 62.6 kg (138 lb)                  Current providers sharing in care for this patient include:  Patient Care Team:  Lizett Parikh MD as PCP - General (Family Practice)  Lizett Parikh MD as Assigned PCP    The following health maintenance items are reviewed in Epic and correct as of today:  Health Maintenance   Topic Date Due    DTAP/TDAP/TD VACCINE (1 - Tdap) 07/12/2013    DEXA  06/04/2024    COVID-19 VACCINE (7 - 2024-25 season) 09/01/2024    ZOSTER VACCINE (3 of 3) 01/01/2025    INFLUENZA VACCINE (1) 09/01/2025    ASHWIN ASSESSMENT  01/17/2026    PHQ-9  01/29/2026    MEDICARE ANNUAL WELLNESS VISIT  07/29/2026    BMP  07/29/2026    LIPID  07/29/2026    ANNUAL REVIEW OF HM ORDERS  07/29/2026    FALL RISK ASSESSMENT  07/29/2026    DIABETES SCREENING  07/29/2028    ADVANCE CARE PLANNING  07/29/2030    HEPATITIS C SCREENING  Completed    DEPRESSION ACTION PLAN  Completed    MIGRAINE ACTION PLAN  Completed    PNEUMOCOCCAL VACCINE 50+ YEARS  Completed    HPV VACCINE (No Doses Required) Completed    RSV VACCINE  Completed    MENINGITIS VACCINE  Aged Out    URINE DRUG SCREEN  Discontinued    MAMMO SCREENING  Discontinued    COLORECTAL CANCER SCREENING  Discontinued            Objective    Exam  /70   Pulse 80   Temp 98  F (36.7  C) (Temporal)   Resp 16   Ht 1.575 m (5' 2.01\")   Wt 64.9 kg (143 lb)   SpO2 98%   BMI 26.15 kg/m     Estimated body mass index is 26.15 kg/m  as calculated from the following:    Height as of this encounter: 1.575 m (5' 2.01\").    Weight as of this encounter: 64.9 kg (143 lb).    Physical Exam  GENERAL: alert and no distress  EYES: Eyes grossly normal to inspection, PERRL and conjunctivae " and sclerae normal  HENT: ear canals and TM's normal, nose and mouth without ulcers or lesions  NECK: no adenopathy, no asymmetry, masses, or scars  RESP: lungs clear to auscultation - no rales, rhonchi or wheezes  CV: regular rate and rhythm, normal S1 S2, no S3 or S4, no murmur, click or rub, no peripheral edema  ABDOMEN: soft, nontender, no hepatosplenomegaly, no masses and bowel sounds normal  MS: no gross musculoskeletal defects noted, no edema  SKIN: no suspicious lesions or rashes  NEURO: Normal strength and tone, mentation intact and speech normal  PSYCH: tearful at times, well groomed, makes good eye contact.   Gait and balance assessed per Gait Speed Test.  Result as above.        7/29/2025   Mini Cog   Mini-Cog Not Completed (choose reason) Patient declines       Patient declines, there are NO concerns for cognitive deficits.           Signed Electronically by: Lizett Parikh MD

## 2025-07-30 DIAGNOSIS — E78.5 HYPERLIPIDEMIA LDL GOAL <130: ICD-10-CM

## 2025-07-30 RX ORDER — ATORVASTATIN CALCIUM 40 MG/1
TABLET, FILM COATED ORAL
Qty: 24 TABLET | Refills: 1 | Status: SHIPPED | OUTPATIENT
Start: 2025-07-30

## 2025-07-30 ASSESSMENT — PATIENT HEALTH QUESTIONNAIRE - PHQ9: SUM OF ALL RESPONSES TO PHQ QUESTIONS 1-9: 8

## 2025-08-12 DIAGNOSIS — I10 HYPERTENSION GOAL BP (BLOOD PRESSURE) < 140/90: ICD-10-CM

## 2025-08-12 RX ORDER — CHLORTHALIDONE 25 MG/1
12.5 TABLET ORAL DAILY
Qty: 45 TABLET | Refills: 2 | Status: SHIPPED | OUTPATIENT
Start: 2025-08-12

## 2025-08-18 ENCOUNTER — ANCILLARY PROCEDURE (OUTPATIENT)
Dept: ULTRASOUND IMAGING | Facility: CLINIC | Age: 80
End: 2025-08-18
Attending: FAMILY MEDICINE
Payer: COMMERCIAL

## 2025-08-18 DIAGNOSIS — E04.1 THYROID NODULE: ICD-10-CM

## 2025-08-18 PROCEDURE — 76536 US EXAM OF HEAD AND NECK: CPT

## 2025-08-19 ENCOUNTER — TRANSFERRED RECORDS (OUTPATIENT)
Dept: HEALTH INFORMATION MANAGEMENT | Facility: CLINIC | Age: 80
End: 2025-08-19
Payer: COMMERCIAL